# Patient Record
Sex: MALE | Race: OTHER | NOT HISPANIC OR LATINO | Employment: OTHER | URBAN - METROPOLITAN AREA
[De-identification: names, ages, dates, MRNs, and addresses within clinical notes are randomized per-mention and may not be internally consistent; named-entity substitution may affect disease eponyms.]

---

## 2020-01-08 ENCOUNTER — OFFICE VISIT (OUTPATIENT)
Dept: FAMILY MEDICINE CLINIC | Facility: CLINIC | Age: 80
End: 2020-01-08
Payer: MEDICARE

## 2020-01-08 VITALS
RESPIRATION RATE: 16 BRPM | HEIGHT: 62 IN | WEIGHT: 135.2 LBS | OXYGEN SATURATION: 97 % | BODY MASS INDEX: 24.88 KG/M2 | HEART RATE: 56 BPM | TEMPERATURE: 98.3 F | SYSTOLIC BLOOD PRESSURE: 132 MMHG | DIASTOLIC BLOOD PRESSURE: 66 MMHG

## 2020-01-08 DIAGNOSIS — I10 ESSENTIAL HYPERTENSION: Primary | ICD-10-CM

## 2020-01-08 DIAGNOSIS — C61 PROSTATE CANCER (HCC): ICD-10-CM

## 2020-01-08 DIAGNOSIS — Z13.220 SCREENING FOR HYPERLIPIDEMIA: ICD-10-CM

## 2020-01-08 DIAGNOSIS — I10 ESSENTIAL HYPERTENSION: ICD-10-CM

## 2020-01-08 DIAGNOSIS — C61 PROSTATE CANCER (HCC): Primary | ICD-10-CM

## 2020-01-08 DIAGNOSIS — R26.89 LOSS OF BALANCE: ICD-10-CM

## 2020-01-08 PROCEDURE — 99203 OFFICE O/P NEW LOW 30 MIN: CPT | Performed by: FAMILY MEDICINE

## 2020-01-08 NOTE — PROGRESS NOTES
Assessment/Plan:  Chief Complaint   Patient presents with    Establish Care     Pt presents to establish care  Pt has medications that he is taking but is not sure of the names to his medications  Pt's daughter will bring them in later to have them documented  Patient Instructions   Here for establishing care and takes 2 medications one for HTN and another for prostate issues  He was suppose to start treatment for his prostate  Await medical records and also medication list  Call if worse and rec ER if any further loss of balance or falls  Stay well hydrated  Establish Oncologist for hx of prostate cancer as well as urologist for hx of prostate cancer  No problem-specific Assessment & Plan notes found for this encounter  Diagnoses and all orders for this visit:    Prostate cancer ([facility] [address] )  -     UA w Reflex to Microscopic w Reflex to Culture -Lab Collect    Loss of balance  -     Comprehensive metabolic panel; Future  -     CBC and differential; Future  -     UA w Reflex to Microscopic w Reflex to Culture -Lab Collect    Essential hypertension    Screening for hyperlipidemia  -     Comprehensive metabolic panel; Future  -     Lipid Panel with Direct LDL reflex; Future          Subjective:      Patient ID: Carlos Tovar is a 78 y o  male  Establish Care (Pt presents to establish care  Pt has medications that he is taking but is not sure of the names to his medications  Pt's daughter will bring them in later to have them documented  ) He lives in  and goes back and forth to [address]  He Has prostate cancer and sees doctor in [facility] for prostate cancer  Takes medication for prostate and also for HTN  Pt  Is feeling better today  He had some dizziness recently  It is better today  No fever or chills or cp or sob, or ha         The following portions of the patient's history were reviewed and updated as appropriate: allergies, current medications, past family history, past medical history, past social history, past surgical history and problem list     Review of Systems   Constitutional: Negative  HENT: Negative  Eyes: Negative  Respiratory: Negative  Cardiovascular: Negative  Gastrointestinal: Negative  Endocrine: Negative  Genitourinary: Negative  Musculoskeletal: Negative  Skin: Negative  Allergic/Immunologic: Negative  Neurological: Negative  Hematological: Negative  Psychiatric/Behavioral: Negative  Objective:      /66 (BP Location: Left arm, Patient Position: Sitting, Cuff Size: Adult)   Pulse 56   Temp 98 3 °F (36 8 °C) (Temporal)   Resp 16   Ht 5' 1 61" (1 565 m)   Wt 61 3 kg (135 lb 3 2 oz)   SpO2 97%   BMI 25 04 kg/m²          Physical Exam   Constitutional: He is oriented to person, place, and time  He appears well-developed and well-nourished  HENT:   Head: Normocephalic and atraumatic  Right Ear: External ear normal    Left Ear: External ear normal    Nose: Nose normal    Mouth/Throat: Oropharynx is clear and moist    Eyes: Pupils are equal, round, and reactive to light  Conjunctivae and EOM are normal    Neck: Normal range of motion  Neck supple  Cardiovascular: Normal rate, regular rhythm, normal heart sounds and intact distal pulses  Pulmonary/Chest: Effort normal and breath sounds normal    Abdominal: Soft  Bowel sounds are normal    Musculoskeletal: Normal range of motion  Neurological: He is alert and oriented to person, place, and time  He has normal reflexes  Skin: Skin is warm and dry  Psychiatric: He has a normal mood and affect   His behavior is normal

## 2020-01-08 NOTE — TELEPHONE ENCOUNTER
Ok to refill ramipril 10 mg once daily Disp #30 with 5 refills and oxybutynin 5 mg daily disp #30 with 5 refills   Send to [place] but get specific address

## 2020-01-08 NOTE — PATIENT INSTRUCTIONS
Here for establishing care and takes 2 medications one for HTN and another for prostate issues  He was suppose to start treatment for his prostate  Await medical records and also medication list  Call if worse and rec ER if any further loss of balance or falls  Stay well hydrated  Establish Oncologist for hx of prostate cancer as well as urologist for hx of prostate cancer

## 2020-01-08 NOTE — TELEPHONE ENCOUNTER
Patients daughter stopped in with patients medication containers stating he needs refills on them    -Lotensor 10 (Ramipril 10mg)    - Urginal - Oxbutinina Clorhidrato 5mg    30 day supply  UUSEE

## 2020-01-08 NOTE — PROGRESS NOTES
BMI Counseling: Body mass index is 25 04 kg/m²  The BMI is above normal  Nutrition recommendations include 3-5 servings of fruits/vegetables daily and reducing intake of cholesterol  Exercise recommendations include exercising 3-5 times per week

## 2020-01-09 ENCOUNTER — TRANSCRIBE ORDERS (OUTPATIENT)
Dept: LAB | Facility: CLINIC | Age: 80
End: 2020-01-09

## 2020-01-09 ENCOUNTER — APPOINTMENT (OUTPATIENT)
Dept: LAB | Facility: CLINIC | Age: 80
End: 2020-01-09
Payer: MEDICARE

## 2020-01-09 DIAGNOSIS — R26.89 LOSS OF BALANCE: ICD-10-CM

## 2020-01-09 DIAGNOSIS — Z13.220 SCREENING FOR HYPERLIPIDEMIA: ICD-10-CM

## 2020-01-09 LAB
ALBUMIN SERPL BCP-MCNC: 3.5 G/DL (ref 3.5–5)
ALP SERPL-CCNC: 59 U/L (ref 46–116)
ALT SERPL W P-5'-P-CCNC: 25 U/L (ref 12–78)
ANION GAP SERPL CALCULATED.3IONS-SCNC: 4 MMOL/L (ref 4–13)
AST SERPL W P-5'-P-CCNC: 14 U/L (ref 5–45)
BASOPHILS # BLD AUTO: 0.03 THOUSANDS/ΜL (ref 0–0.1)
BASOPHILS NFR BLD AUTO: 0 % (ref 0–1)
BILIRUB SERPL-MCNC: 0.62 MG/DL (ref 0.2–1)
BILIRUB UR QL STRIP: NEGATIVE
BUN SERPL-MCNC: 13 MG/DL (ref 5–25)
CALCIUM SERPL-MCNC: 9.2 MG/DL (ref 8.3–10.1)
CHLORIDE SERPL-SCNC: 107 MMOL/L (ref 100–108)
CHOLEST SERPL-MCNC: 209 MG/DL (ref 50–200)
CLARITY UR: CLEAR
CO2 SERPL-SCNC: 30 MMOL/L (ref 21–32)
COLOR UR: YELLOW
CREAT SERPL-MCNC: 0.89 MG/DL (ref 0.6–1.3)
EOSINOPHIL # BLD AUTO: 0.18 THOUSAND/ΜL (ref 0–0.61)
EOSINOPHIL NFR BLD AUTO: 2 % (ref 0–6)
ERYTHROCYTE [DISTWIDTH] IN BLOOD BY AUTOMATED COUNT: 13.2 % (ref 11.6–15.1)
GFR SERPL CREATININE-BSD FRML MDRD: 80 ML/MIN/1.73SQ M
GLUCOSE P FAST SERPL-MCNC: 94 MG/DL (ref 65–99)
GLUCOSE UR STRIP-MCNC: NEGATIVE MG/DL
HCT VFR BLD AUTO: 37.1 % (ref 36.5–49.3)
HDLC SERPL-MCNC: 55 MG/DL
HGB BLD-MCNC: 12 G/DL (ref 12–17)
HGB UR QL STRIP.AUTO: NEGATIVE
IMM GRANULOCYTES # BLD AUTO: 0.04 THOUSAND/UL (ref 0–0.2)
IMM GRANULOCYTES NFR BLD AUTO: 1 % (ref 0–2)
KETONES UR STRIP-MCNC: NEGATIVE MG/DL
LDLC SERPL CALC-MCNC: 133 MG/DL (ref 0–100)
LEUKOCYTE ESTERASE UR QL STRIP: NEGATIVE
LYMPHOCYTES # BLD AUTO: 2.72 THOUSANDS/ΜL (ref 0.6–4.47)
LYMPHOCYTES NFR BLD AUTO: 35 % (ref 14–44)
MCH RBC QN AUTO: 31.8 PG (ref 26.8–34.3)
MCHC RBC AUTO-ENTMCNC: 32.3 G/DL (ref 31.4–37.4)
MCV RBC AUTO: 98 FL (ref 82–98)
MONOCYTES # BLD AUTO: 0.67 THOUSAND/ΜL (ref 0.17–1.22)
MONOCYTES NFR BLD AUTO: 9 % (ref 4–12)
NEUTROPHILS # BLD AUTO: 4.18 THOUSANDS/ΜL (ref 1.85–7.62)
NEUTS SEG NFR BLD AUTO: 53 % (ref 43–75)
NITRITE UR QL STRIP: NEGATIVE
NRBC BLD AUTO-RTO: 0 /100 WBCS
PH UR STRIP.AUTO: 6.5 [PH]
PLATELET # BLD AUTO: 249 THOUSANDS/UL (ref 149–390)
PMV BLD AUTO: 10.7 FL (ref 8.9–12.7)
POTASSIUM SERPL-SCNC: 4.3 MMOL/L (ref 3.5–5.3)
PROT SERPL-MCNC: 7.4 G/DL (ref 6.4–8.2)
PROT UR STRIP-MCNC: NEGATIVE MG/DL
RBC # BLD AUTO: 3.77 MILLION/UL (ref 3.88–5.62)
SODIUM SERPL-SCNC: 141 MMOL/L (ref 136–145)
SP GR UR STRIP.AUTO: 1.02 (ref 1–1.03)
TRIGL SERPL-MCNC: 106 MG/DL
UROBILINOGEN UR QL STRIP.AUTO: 1 E.U./DL
WBC # BLD AUTO: 7.82 THOUSAND/UL (ref 4.31–10.16)

## 2020-01-09 PROCEDURE — 81003 URINALYSIS AUTO W/O SCOPE: CPT | Performed by: FAMILY MEDICINE

## 2020-01-09 PROCEDURE — 36415 COLL VENOUS BLD VENIPUNCTURE: CPT

## 2020-01-09 PROCEDURE — 85025 COMPLETE CBC W/AUTO DIFF WBC: CPT

## 2020-01-09 PROCEDURE — 80061 LIPID PANEL: CPT

## 2020-01-09 PROCEDURE — 80053 COMPREHEN METABOLIC PANEL: CPT

## 2020-01-09 RX ORDER — RAMIPRIL 10 MG/1
10 CAPSULE ORAL DAILY
Qty: 30 CAPSULE | Refills: 5 | Status: SHIPPED | OUTPATIENT
Start: 2020-01-09 | End: 2020-07-22

## 2020-01-09 RX ORDER — OXYBUTYNIN CHLORIDE 5 MG/1
5 TABLET ORAL DAILY
Qty: 30 TABLET | Refills: 5 | Status: SHIPPED | OUTPATIENT
Start: 2020-01-09 | End: 2020-02-28

## 2020-01-13 ENCOUNTER — TELEPHONE (OUTPATIENT)
Dept: HEMATOLOGY ONCOLOGY | Facility: CLINIC | Age: 80
End: 2020-01-13

## 2020-01-13 NOTE — TELEPHONE ENCOUNTER
New Patient Encounter    New Patient Intake Form   Patient Details:  Jose Richard  1940  [id number]    Background Information:  [address] starts by opening a telephone encounter and gathering the following information   Who is calling to schedule? If not self, relationship to patient? daughter   Referring Provider Anna Mcmahon   What is the diagnosis? Prostate CA, enlarging tumor   Is this diagnosis confirmed yes   Is there a confirmed diagnosis from a biopsy/tissue reviewed by pathology? Yes   Is there any prior history of Cancer? No   If yes, please explain    When was the diagnosis? Several years ago  approx 2015 [place] [facility]   Is patient aware of diagnosis? Yes   Reason for visit? NP DX   Have you had any testing done? If so: when, where? Yes   Are records in EPIC? no   Was the patient told to bring a disk? Yes, PT has FILMS not CDs   Scheduling Information:   Preferred [place]:  [facility] Provider? no   Are there any dates/time the patient cannot be seen? no      Miscellaneous: pt originally dx  apox 2015 w/ Prostate CA, no surgery or RT, only hormonal TX  Rencly enlarging tumor  Pt has been living in [facility] and recently  here to be near family  Records were given to Dr Mae Brunsno, will be  Scanned into Epic by Latoya Ching in that office  Pt w/b filmsPt scheduled to see Dr Sania Ahumada on 2/21/20   After completing the above information, please route to Financial Counselor and the appropriate Nurse Navigator for review

## 2020-01-28 ENCOUNTER — CONSULT (OUTPATIENT)
Dept: HEMATOLOGY ONCOLOGY | Facility: CLINIC | Age: 80
End: 2020-01-28
Payer: MEDICARE

## 2020-01-28 VITALS
RESPIRATION RATE: 16 BRPM | SYSTOLIC BLOOD PRESSURE: 120 MMHG | OXYGEN SATURATION: 94 % | DIASTOLIC BLOOD PRESSURE: 70 MMHG | TEMPERATURE: 97.8 F | HEIGHT: 62 IN | BODY MASS INDEX: 25.4 KG/M2 | HEART RATE: 54 BPM | WEIGHT: 138 LBS

## 2020-01-28 DIAGNOSIS — C61 PROSTATE CANCER (HCC): Primary | ICD-10-CM

## 2020-01-28 PROCEDURE — 99204 OFFICE O/P NEW MOD 45 MIN: CPT | Performed by: INTERNAL MEDICINE

## 2020-01-28 NOTE — PROGRESS NOTES
2020    Ricardo Patient was seen in consultation today in regards to prostate cancer  He is 78years old and was diagnosed with adenocarcinoma of the prostate, [place] score 3+3=6 by biopsy on May 25, 2010  According to the patient has been receiving androgen deprivation therapy every 3 months for approximately 1 year while in [facility]  He was seen in follow-up on 2019 by Dr Himanshu Foss in the Oncology Clinic  Enzalutamide 160 mg daily was recommended but has not yet been started  He has prostate enlargement with associated obstructive symptoms  Review of Systems:    General:  He notes increasing fatigue in the past year  Head and Neck: No nosebleeds, no oral cavity or throat soreness  Cardiovascular: No chest pain, no lower extremity edema  Respriatory:  He complains of cough and mucus production a few times per day  No dyspnea  GI: Appetite is good, no abdominal pain, he has had constipation in the past several months, previously bowel habits were formed and regular  :  He has urinary frequency and urinary hesitancy and nocturia 6-10 times  Musculoskeletal: No back pains or joint pain  Skin: No skin rash  Neurological:  He has mild headache 1-2 times per month  No numbness, no weakness  Hematologic: No easy bruising  Psychiatric: No emotional problems    Medications:    Current Outpatient Medications   Medication Sig Dispense Refill    oxybutynin (DITROPAN) 5 mg tablet Take 1 tablet (5 mg total) by mouth daily 30 tablet 5    ramipril (ALTACE) 10 MG capsule Take 1 capsule (10 mg total) by mouth daily 30 capsule 5     No current facility-administered medications for this visit        Past Medical History:  Hypertension    Past Surgical History:  Inguinal hernia repair    Family History:    His mother  age 80 with CVA, she had a pacemaker and heart disease  His father  of MI at age 80  He has 6 siblings all of whom are in good health  He has 1 daughter and 2 sons, all 3 of his children are in good health    Social History:    He is  and lives with his 2nd wife  His 1st wife passed away  He was born in the [facility] and 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 and was disabled due to a back injury at work  Subsequently, he moved back to the [facility] until 2 months ago when he came to live with his daughter  He has not been an alcohol drinker  He has not been a cigarette smoker    Physical Examination:    /70   Pulse (!) 54   Temp 97 8 °F (36 6 °C) (Tympanic)   Resp 16   Ht 5' 2" (1 575 m)   Wt 62 6 kg (138 lb)   SpO2 94%   BMI 25 24 kg/m²      General appearance: Appears well  Head: Normocephalic  Eyes: Extraocular movements intact  Ears: No gross hearing deficit  Oropharynx: Clear  Neck: Supple, No lymphadenopathy  Chest: No axillary adenopathy  Lungs: Clear to auscultation bilaterally  Heart: Regular rate and rhythm  Abdomen: No tenderness or distention, no hepatic or splenic enlargement; No inguinal  lymphadenopathy  Extremities: No lower extremity edema bilaterally  Skin: No rashes  Neurologic: Grossly intact, no focal neurological deficit  Psychiatric: Oriented to person, place and time, normal mood and affect    ECOG 0-1    Laboratory:    From November 11, 2019:  WBC is 8 7, hemoglobin 12 3, platelets 784, creatinine 0 85, PSA is 13 4 (0 0-4 0), testosterone 22 1 (181-759)    From January 9, 2020:  Creatinine 0 89, calcium 9 2, AST 14, ALT 25, alk-phos 59, bili 0 62, WBC 7 82, hemoglobin 12 0, platelets 134, WBC differential neutrophils 53%, immature is 1%, lymphs 35%, monos 9%, eos 2%    Echocardiogram on November 21, 2019: There is normal LV systolic function and grade 1 diastolic dysfunction    Assessment:    History of prostate cancer Nilay score 3+3=6 diagnosis May 2010 with rising PSA and prostatism      Recommendations:    Further evaluation is to begin with contrast enhanced CT scan of chest, abdomen, and pelvis and whole-body bone scan to assess for metastatic disease in addition to follow-up PSA  It is anticipated that androgen deprivation therapy will be resumed in the near future  If disease is localized to the prostate perhaps he will be candidate for prostate directed therapy which may relieve his  prostatism symptoms more effectively than systemic therapy  Limited medical records from Dr Isabell Schulte of the Oncology Clinic, [facility] Madi Wing written in [place]ones[place] have been sent for [place] translation  The patient and his daughter Avery Clements who was with him in the office today and who served to interpret from [place] to [facility] are aware seek medical attention for progressive cough, urinary frequency, difficulty voiding, excessive fatigue, or if other new problems arise  Otherwise, I plan to see him again after the evaluation above has been completed  Today's office visit required 45 minutes, over 50% of the time was utilized to review diagnostic tests, impressions and recommendations

## 2020-01-28 NOTE — TELEPHONE ENCOUNTER
Pt has active medicare part A & B effective 01/01/20  Not other ins on file  Called the pt to see if he has any other ins but got his voicemail  left him a message to call me back

## 2020-01-28 NOTE — PATIENT INSTRUCTIONS
The patient and his daughter [place] who was with me in the office today aware seek medical attention for progressive cough, urinary frequency, difficulty voiding, excessive fatigue, or if other new problems arise

## 2020-02-14 ENCOUNTER — TELEPHONE (OUTPATIENT)
Dept: INFUSION CENTER | Facility: HOSPITAL | Age: 80
End: 2020-02-14

## 2020-02-14 NOTE — TELEPHONE ENCOUNTER
Reviewed instructions for ditropan with daughter and reviewed patient's appt time with Dr Little Squires

## 2020-02-17 ENCOUNTER — HOSPITAL ENCOUNTER (OUTPATIENT)
Dept: NUCLEAR MEDICINE | Facility: HOSPITAL | Age: 80
Discharge: HOME | End: 2020-02-17
Attending: INTERNAL MEDICINE
Payer: MEDICARE

## 2020-02-17 ENCOUNTER — APPOINTMENT (OUTPATIENT)
Dept: LAB | Facility: HOSPITAL | Age: 80
End: 2020-02-17
Attending: INTERNAL MEDICINE
Payer: MEDICARE

## 2020-02-17 ENCOUNTER — HOSPITAL ENCOUNTER (OUTPATIENT)
Dept: CT IMAGING | Facility: HOSPITAL | Age: 80
Discharge: HOME | End: 2020-02-17
Attending: INTERNAL MEDICINE
Payer: MEDICARE

## 2020-02-17 DIAGNOSIS — C61 PROSTATE CANCER (HCC): ICD-10-CM

## 2020-02-17 LAB — PSA SERPL-MCNC: 5.2 NG/ML (ref 0–4)

## 2020-02-17 PROCEDURE — 71260 CT THORAX DX C+: CPT

## 2020-02-17 PROCEDURE — 78306 BONE IMAGING WHOLE BODY: CPT

## 2020-02-17 PROCEDURE — A9503 TC99M MEDRONATE: HCPCS

## 2020-02-17 PROCEDURE — 74177 CT ABD & PELVIS W/CONTRAST: CPT

## 2020-02-17 PROCEDURE — 84153 ASSAY OF PSA TOTAL: CPT

## 2020-02-17 RX ADMIN — IOHEXOL 100 ML: 350 INJECTION, SOLUTION INTRAVENOUS at 11:57

## 2020-02-21 ENCOUNTER — TELEPHONE (OUTPATIENT)
Dept: HEMATOLOGY ONCOLOGY | Facility: CLINIC | Age: 80
End: 2020-02-21

## 2020-02-21 ENCOUNTER — OFFICE VISIT (OUTPATIENT)
Dept: HEMATOLOGY ONCOLOGY | Facility: CLINIC | Age: 80
End: 2020-02-21
Payer: MEDICARE

## 2020-02-21 VITALS
HEART RATE: 54 BPM | TEMPERATURE: 97.2 F | SYSTOLIC BLOOD PRESSURE: 130 MMHG | WEIGHT: 137 LBS | DIASTOLIC BLOOD PRESSURE: 70 MMHG | BODY MASS INDEX: 25.21 KG/M2 | RESPIRATION RATE: 16 BRPM | HEIGHT: 62 IN | OXYGEN SATURATION: 94 %

## 2020-02-21 DIAGNOSIS — I71.2 THORACIC AORTIC ANEURYSM WITHOUT RUPTURE (HCC): ICD-10-CM

## 2020-02-21 DIAGNOSIS — C61 PROSTATE CANCER (HCC): Primary | ICD-10-CM

## 2020-02-21 PROCEDURE — 1160F RVW MEDS BY RX/DR IN RCRD: CPT | Performed by: INTERNAL MEDICINE

## 2020-02-21 PROCEDURE — 3078F DIAST BP <80 MM HG: CPT | Performed by: INTERNAL MEDICINE

## 2020-02-21 PROCEDURE — 99213 OFFICE O/P EST LOW 20 MIN: CPT | Performed by: INTERNAL MEDICINE

## 2020-02-21 PROCEDURE — 1036F TOBACCO NON-USER: CPT | Performed by: INTERNAL MEDICINE

## 2020-02-21 PROCEDURE — 3075F SYST BP GE 130 - 139MM HG: CPT | Performed by: INTERNAL MEDICINE

## 2020-02-21 NOTE — PATIENT INSTRUCTIONS
The patient and his daughter Roberto Carlos Alvarez are aware seek medical attention for progressive cough, urinary frequency, difficulty voiding, excessive fatigue, or if other new problems arise  Otherwise, I plan to see him again after the evaluation above has been completed

## 2020-02-21 NOTE — PROGRESS NOTES
2/21/2020    [place]        Hematology/Oncology History:    History of prostate cancer Nilay score 3+3=6 diagnosis May 2010 with rising PSA and prostatism  Review of systems:      General:  He notes increasing fatigue in the past year  Head and Neck: No nosebleeds, no oral cavity or throat soreness  Cardiovascular: No chest pain, no lower extremity edema  Respriatory:  He complains of cough and mucus production a few times per day  No dyspnea  GI: Appetite is good, no abdominal pain, he has had constipation in the past several months, previously bowel habits were formed and regular  :  He has urinary frequency and urinary hesitancy and nocturia 6-10 times  Musculoskeletal: No back pains or joint pain  Skin: No skin rash  Neurological:  He has mild headache 1-2 times per month  No numbness, no weakness  Hematologic: No easy bruising  Psychiatric: No emotional problems    Physical Examination:    Blood pressure 130/70, pulse (!) 54, temperature (!) 97 2 °F (36 2 °C), temperature source Tympanic, resp  rate 16, height 5' 2" (1 575 m), weight 62 1 kg (137 lb), SpO2 94 %  Body surface area is 1 63 meters squared  General appearance: Appears well  HEENT:  EOMI  Oropharynx clear  No lymphadenopathy of the neck  Chest: No axillary adenopathy  Lungs: Clear to auscultation bilaterally  Heart: Regular rate and rhythm  Abdomen: No tenderness or distention, no hepatic or splenic enlargement; No inguinal  lymphadenopathy  Extremities: No lower extremity edema bilaterally  Skin: No rashes  Neurologic: Grossly intact, no focal neurological deficit  Psychiatric: Oriented to person, place and time, normal mood and affect     ECOG 0-1    Laboratory:    From February 17, 2020:  PSA is 5 2    Nuclear medicine bone scan from February 17, 2020: There are degenerative changes of the spine, no scintigraphic evidence of osseous metastasis      Contrast enhanced CT of chest, abdomen and pelvis from February 17, 2020: No focal lung consolidation, 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, mediastinum and hilar are unremarkable, bilateral gynecomastia, liver, spleen, pancreas, adrenals, kidneys are unremarkable other than simple renal cysts, no abdominal pelvic lymphadenopathy, prostate is enlarged, thickening of the bladder wall is likely due to chronic bladder outlet obstruction from the enlarge prostate, 9 2 x 3 8 x 6 6 cm subcutaneous fluid density cystic structure along the midline lower back, no destructive osseous lesion  Assessment/Plan:    History of prostate cancer Nilay score 3+3=6 diagnosis May 2010 with rising PSA and prostatism      Leuprolide is to be continued  The purpose, means administration potential risks of leuprolide were reviewed and written informed consent was obtained  The plan is leuprolide 22 5 mg IM q 3 months  The patient has an appointment with Dr Jim De La Cruz as to local measures to manage the enlarge prostate and symptoms of prostatism  The patient is a potential candidate for radiation therapy or microwave therapy  The patient has previously declined radiation therapy      Limited medical records from Dr Bro Manning of the Oncology Clinic, [facility] Madi Wing written in [facility] [place] [place] have been sent for [place] translation  Also, thoracic surgery evaluation is recommended as to management of the aneurysm of the ascending thoracic aorta      The patient and his daughter Haroon Ríos who was with him in the office today and who served to interpret from [place] to [facility] [place] [place] are aware seek medical attention for urinary frequency, difficulty voiding, excessive fatigue, or if other new problems arise  Otherwise, I plan to see him again in 3 months      Today's office visit required 45 minutes, over 50% of the time was utilized to review diagnostic tests, impressions and recommendations

## 2020-02-21 NOTE — TELEPHONE ENCOUNTER
I called the patient and left a message that the appointment for the injection had to be moved after Dr Saurav Florence appointment: 5/19 2 12 pm  I then verbalized if for some reason this appointment does not work to call the office to reschedule

## 2020-02-24 DIAGNOSIS — C61 PROSTATE CANCER (HCC): ICD-10-CM

## 2020-02-24 DIAGNOSIS — I71.2 THORACIC AORTIC ANEURYSM WITHOUT RUPTURE (HCC): Primary | ICD-10-CM

## 2020-02-25 ENCOUNTER — HOSPITAL ENCOUNTER (OUTPATIENT)
Dept: INFUSION CENTER | Facility: CLINIC | Age: 80
Discharge: HOME | End: 2020-02-25
Payer: MEDICARE

## 2020-02-25 VITALS
HEART RATE: 49 BPM | SYSTOLIC BLOOD PRESSURE: 174 MMHG | TEMPERATURE: 97.4 F | RESPIRATION RATE: 18 BRPM | DIASTOLIC BLOOD PRESSURE: 71 MMHG

## 2020-02-25 DIAGNOSIS — C61 PROSTATE CANCER (HCC): Primary | ICD-10-CM

## 2020-02-25 PROCEDURE — 96402 CHEMO HORMON ANTINEOPL SQ/IM: CPT

## 2020-02-25 RX ADMIN — LEUPROLIDE ACETATE 22.5 MG: KIT at 13:39

## 2020-02-25 NOTE — SOCIAL WORK
MSW received pt's distress thermometer, scoring high (5)  No family or spiritual concerns noted at this time  Pt notes concerns with treatment decisions, several physical concerns such as changes in urination, constipation, fatigue, dry/congested nose, and sleep (loss of sleep)  For emotional concerns, pt notes nervousness, sadness, worry and a loss of interest in usual activities, and "sometimes" depression  Pt is here for treatment in the infusion center, accompanied by his daughter  MSW introduced themselves and discussed their role within patient support services  Pt was able to speak some English but daughter mainly translated for him with pt's consent  Pt was currently having his blood pressure taken and reported some pain following an injection  He had thought there would be a numbing agent prior to the injection and was waiting for an infusion RN to bring him some ice for the injection site  Pt shared that he was doing okay, nothing too concerning at this time otherwise  MSW offered follow up support as needed and provided contact information, encouraging the pt to call at any time  Pt was grateful for the outreach

## 2020-02-25 NOTE — PROGRESS NOTES
Presented today for lupron injection, same given as ordered in L gluteal region, tolerated well, will return for next appointment as scheduled

## 2020-02-28 ENCOUNTER — OFFICE VISIT (OUTPATIENT)
Dept: UROLOGY | Facility: CLINIC | Age: 80
End: 2020-02-28
Payer: MEDICARE

## 2020-02-28 VITALS
HEART RATE: 48 BPM | SYSTOLIC BLOOD PRESSURE: 122 MMHG | BODY MASS INDEX: 25.21 KG/M2 | DIASTOLIC BLOOD PRESSURE: 64 MMHG | WEIGHT: 137 LBS | HEIGHT: 62 IN

## 2020-02-28 DIAGNOSIS — R39.9 LOWER URINARY TRACT SYMPTOMS: ICD-10-CM

## 2020-02-28 DIAGNOSIS — C61 PROSTATE CANCER (HCC): Primary | ICD-10-CM

## 2020-02-28 LAB — POST-VOID RESIDUAL VOLUME, ML POC: 16 ML

## 2020-02-28 PROCEDURE — 99213 OFFICE O/P EST LOW 20 MIN: CPT | Performed by: UROLOGY

## 2020-02-28 PROCEDURE — 1160F RVW MEDS BY RX/DR IN RCRD: CPT | Performed by: UROLOGY

## 2020-02-28 PROCEDURE — 3008F BODY MASS INDEX DOCD: CPT | Performed by: UROLOGY

## 2020-02-28 PROCEDURE — 1036F TOBACCO NON-USER: CPT | Performed by: UROLOGY

## 2020-02-28 PROCEDURE — 51798 US URINE CAPACITY MEASURE: CPT | Performed by: UROLOGY

## 2020-02-28 PROCEDURE — 3074F SYST BP LT 130 MM HG: CPT | Performed by: UROLOGY

## 2020-02-28 PROCEDURE — 3078F DIAST BP <80 MM HG: CPT | Performed by: UROLOGY

## 2020-02-28 RX ORDER — TAMSULOSIN HYDROCHLORIDE 0.4 MG/1
0.4 CAPSULE ORAL
Qty: 30 CAPSULE | Refills: 6 | Status: SHIPPED | OUTPATIENT
Start: 2020-02-28 | End: 2020-04-24 | Stop reason: SDUPTHER

## 2020-02-28 RX ORDER — FINASTERIDE 5 MG/1
5 TABLET, FILM COATED ORAL DAILY
Qty: 30 TABLET | Refills: 6 | Status: SHIPPED | OUTPATIENT
Start: 2020-02-28 | End: 2020-04-24 | Stop reason: SDUPTHER

## 2020-02-28 NOTE — PROGRESS NOTES
UROLOGY NEW CONSULT NOTE     CHIEF COMPLAINT   Lisa Jason is a 78 y o  male with a complaint of   Chief Complaint   Patient presents with    Follow-up    Prostate Cancer       History of Present Illness:     78 y o  male   Who presents with his daughter  Patient was diagnosed in 2010 in the [facility] with low risk Nilay 6 prostate cancer  Unfortunately do not have records of this  The patient was  Offered treatment at that time but given the low risk, this was deferred  The patient has had significant urinary symptoms and has been managed on oxybutynin  His daughter does report some occasional intermittent constipation and confusion  Patient's primary concern is nocturia 2-3 times per night and urinary frequency  He presents today for discussion of the symptoms and his prostate cancer  He has seen Medical Oncology was undertaken staging imaging with CT scan and bone scan in these are negative for diffuse disease  Lab Results   Component Value Date    PSA 5 2 (H) 02/17/2020     Past Medical History:     Past Medical History:   Diagnosis Date    Hypertension     Prostate cancer ([facility] [address] )        PAST SURGICAL HISTORY:     Past Surgical History:   Procedure Laterality Date    HERNIA REPAIR         CURRENT MEDICATIONS:     Current Outpatient Medications   Medication Sig Dispense Refill    oxybutynin (DITROPAN) 5 mg tablet Take 1 tablet (5 mg total) by mouth daily 30 tablet 5    ramipril (ALTACE) 10 MG capsule Take 1 capsule (10 mg total) by mouth daily 30 capsule 5     No current facility-administered medications for this visit          ALLERGIES:   No Known Allergies    SOCIAL HISTORY:     Social History     Socioeconomic History    Marital status: Single     Spouse name: None    Number of children: None    Years of education: None    Highest education level: None   Occupational History    None   Social Needs    Financial resource strain: None    Food insecurity:     Worry: None Inability: None    Transportation needs:     Medical: None     Non-medical: None   Tobacco Use    Smoking status: Never Smoker    Smokeless tobacco: Never Used   Substance and Sexual Activity    Alcohol use: Never     Frequency: Never    Drug use: Never    Sexual activity: None   Lifestyle    Physical activity:     Days per week: None     Minutes per session: None    Stress: None   Relationships    Social connections:     Talks on phone: None     Gets together: None     Attends Hindu service: None     Active member of club or organization: None     Attends meetings of clubs or organizations: None     Relationship status: None    Intimate partner violence:     Fear of current or ex partner: None     Emotionally abused: None     Physically abused: None     Forced sexual activity: None   Other Topics Concern    None   Social History Narrative    None       SOCIAL HISTORY:     Family History   Problem Relation Age of Onset    No Known Problems Mother     No Known Problems Father        REVIEW OF SYSTEMS:     Review of Systems   Constitutional: Negative  Respiratory: Negative  Cardiovascular: Negative  Gastrointestinal: Negative  Genitourinary: Positive for frequency and urgency  Musculoskeletal: Negative  Skin: Negative  Psychiatric/Behavioral: Negative  PHYSICAL EXAM:     /64 (BP Location: Right arm, Patient Position: Sitting, Cuff Size: Adult)   Pulse (!) 48   Ht 5' 2" (1 575 m)   Wt 62 1 kg (137 lb)   BMI 25 06 kg/m²     General:  Healthy appearing male in no acute distress  They have a normal affect  There is not appear to be any gross neurologic defects or abnormalities  HEENT:  Normocephalic, atraumatic  Neck is supple without any palpable lymphadenopathy  Cardiovascular:  Patient has normal palpable distal radial pulses  There is no significant peripheral edema  No JVD is noted  Respiratory:  Patient has unlabored respirations    There is no audible wheeze or rhonchi  Abdomen:   Abdomen is soft and nontender  There is no tympany  Inguinal and umbilical hernia are not appreciated  Genitourinary: Deferred at this time    Musculoskeletal:  Patient does not have significant CVA tenderness in the  flank with palpation or percussion  They full range of motion in all 4 extremities  Strength in all 4 extremities appears congruent  Patient is able to ambulate without assistance or difficulty  Dermatologic:  Patient has no skin abnormalities or rashes  LABS:     CBC:   Lab Results   Component Value Date    WBC 7 82 2020    HGB 12 0 2020    HCT 37 1 2020    MCV 98 2020     2020       BMP:   Lab Results   Component Value Date    CALCIUM 9 2 2020    K 4 3 2020    CO2 30 2020     2020    BUN 13 2020    CREATININE 0 89 2020     Lab Results   Component Value Date    PSA 5 2 (H) 2020     IMAGIN/17/20  CT CHEST, ABDOMEN AND PELVIS WITH IV CONTRAST     INDICATION:   C61: Malignant neoplasm of prostate      COMPARISON:  None      TECHNIQUE: CT examination of the chest, abdomen and pelvis was performed  Axial, sagittal, and coronal 2D reformatted images were created from the source data and submitted for interpretation      Radiation dose length product (DLP) for this visit:  526 mGy-cm   This examination, like all CT scans performed in the [facility], was performed utilizing techniques to minimize radiation dose exposure, including the use of iterative   reconstruction and automated exposure control      IV Contrast:  100 mL of iohexol (OMNIPAQUE)   was administered intravenously without immediate adverse reaction  Enteric Contrast: Enteric contrast was administered      FINDINGS:     CHEST     LUNGS:  Mild atelectatic changes in the right greater than left lower lobes  No focal consolidation    Mild secretions are seen within the trachea      PLEURA: Unremarkable      HEART/GREAT VESSELS:  Coronary artery calcifications  The heart is normal in size  No pericardial effusion  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  The aorta tapers to 3 1 cm at the level of the aortic arch      MEDIASTINUM AND JOSEPHINE:  Unremarkable      CHEST WALL AND LOWER NECK:   Bilateral gynecomastia      ABDOMEN     LIVER/BILIARY TREE:  Unremarkable      GALLBLADDER:  No calcified gallstones  No pericholecystic inflammatory change      SPLEEN:  Unremarkable      PANCREAS:  Unremarkable      ADRENAL GLANDS:  Unremarkable      KIDNEYS/URETERS:  One or more simple renal cyst(s) is noted  Otherwise unremarkable kidneys  No hydronephrosis      STOMACH AND BOWEL:  No bowel obstruction  Diverticulosis without evidence of diverticulitis      APPENDIX:  No findings to suggest appendicitis      ABDOMINOPELVIC CAVITY:  No ascites or free intraperitoneal air  No lymphadenopathy      VESSELS:  Mild atherosclerotic calcifications      PELVIS     REPRODUCTIVE ORGANS:  Enlarged prostate      URINARY BLADDER:  Thickening of the bladder wall likely due to chronic bladder outlet obstruction due to enlarged prostate      ABDOMINAL WALL/INGUINAL REGIONS:  Small fat-containing right inguinal hernia  Status post left inguinal hernia repair  Large subcutaneous fluid density cystic structure along the midline lower back measuring 9 2 x 3 8 x 6 6 cm (series 2, image 75)      OSSEOUS STRUCTURES:  No acute fracture or destructive osseous lesion  Degenerative changes of the osseous structures      IMPRESSION:     1  No evidence of metastasis within the chest, abdomen, or pelvis  2   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  3   Diverticulosis without evidence of diverticulitis  4   Thickening of the bladder likely due to chronic bladder outlet obstruction due to enlarged prostate, correlate with urinalysis to exclude infection    5   Large subcutaneous fluid density cystic structure along the midline lower back measuring 9 2 x 3 8 x 6 6 cm, nonspecific however may represent a sebaceous cyst, correlate clinically  6  Other findings as above  2/17/20  BONE SCAN  WHOLE BODY     INDICATION: C61: Malignant neoplasm of prostate     PREVIOUS FILM CORRELATION:    CT 2/17/2020     TECHNIQUE:   This study was performed following the intravenous administration of 27 4 mCi Tc-99m labeled MDP  Delayed, anterior and posterior whole body images were acquired, 2-3 hours after radiopharmaceutical administration      FINDINGS:     Degenerative changes in the spine  Lobulated renal activity likely related to underlying cortical cysts  There is no scintigraphic evidence of osseous metastasis        IMPRESSION:     1  No scintigraphic evidence of osseous metastasis  PATHOLOGY:     Report of a history of [place] 3+3=6 prostate cancer from May of 2010    PROCEDURE:     Recent Results (from the past 2 hour(s))   POCT Measure PVR    Collection Time: 02/28/20  7:56 AM   Result Value Ref Range    POST-VOID RESIDUAL VOLUME, ML POC 16 mL        ASSESSMENT:     78 y o  male with remote diagnosis of low risk CaP ([place] 6) and symptoms of outlet obstruction    PLAN:      I have recommended the patient stop the Ditropan given some intermittent confusion and my concern about utilization of this medication in the elderly population  I instead I have recommended that we address the patient's prostate with Proscar 5 mg and  Flomax 0 4 mg  If the patient has significant irritability or worsening of his symptoms with stoppage of the oxybutynin, we can certainly add back in Myrbetriq which has a lower side effect profile  With regard to the patient's history of prostate cancer, I am unconcerned by his staging imaging and PSA level given his age    He has been on a defect 0 active surveillance protocol and so I have recommended an upfront multiparametric MRI of the prostate as we are approaching the 10 year danya  If there is occult lesion, I may consider repeat biopsy with fusion technique if the family wants to be aggressive  The alternative to this would be careful watchful waiting with monitoring of the PSA and consideration of repeat biopsy should the PSA go above 10  I will plan to see the patient back in 3 months to reassess his voiding symptoms and reviewed the MRI  If there are concerns about the imaging prior, I will certainly call him and see him back sooner

## 2020-03-13 ENCOUNTER — APPOINTMENT (OUTPATIENT)
Dept: LAB | Age: 80
End: 2020-03-13
Payer: MEDICARE

## 2020-03-13 ENCOUNTER — HOSPITAL ENCOUNTER (OUTPATIENT)
Dept: RADIOLOGY | Age: 80
Discharge: HOME | End: 2020-03-13
Payer: MEDICARE

## 2020-03-13 DIAGNOSIS — C61 PROSTATE CANCER (HCC): ICD-10-CM

## 2020-03-13 LAB
ANION GAP SERPL CALCULATED.3IONS-SCNC: 4 MMOL/L (ref 4–13)
BUN SERPL-MCNC: 13 MG/DL (ref 5–25)
CALCIUM SERPL-MCNC: 9.4 MG/DL (ref 8.3–10.1)
CHLORIDE SERPL-SCNC: 106 MMOL/L (ref 100–108)
CO2 SERPL-SCNC: 29 MMOL/L (ref 21–32)
CREAT SERPL-MCNC: 0.82 MG/DL (ref 0.6–1.3)
GFR SERPL CREATININE-BSD FRML MDRD: 83 ML/MIN/1.73SQ M
GLUCOSE P FAST SERPL-MCNC: 94 MG/DL (ref 65–99)
POTASSIUM SERPL-SCNC: 4 MMOL/L (ref 3.5–5.3)
SODIUM SERPL-SCNC: 139 MMOL/L (ref 136–145)

## 2020-03-13 PROCEDURE — 36415 COLL VENOUS BLD VENIPUNCTURE: CPT

## 2020-03-13 PROCEDURE — A9585 GADOBUTROL INJECTION: HCPCS | Performed by: UROLOGY

## 2020-03-13 PROCEDURE — 76377 3D RENDER W/INTRP POSTPROCES: CPT

## 2020-03-13 PROCEDURE — 80048 BASIC METABOLIC PNL TOTAL CA: CPT

## 2020-03-13 PROCEDURE — 72197 MRI PELVIS W/O & W/DYE: CPT

## 2020-03-13 RX ADMIN — GADOBUTROL 6 ML: 604.72 INJECTION INTRAVENOUS at 09:51

## 2020-03-24 ENCOUNTER — TELEPHONE (OUTPATIENT)
Dept: CARDIOLOGY CLINIC | Facility: CLINIC | Age: 80
End: 2020-03-24

## 2020-04-15 ENCOUNTER — DOCUMENTATION (OUTPATIENT)
Dept: HEMATOLOGY ONCOLOGY | Facility: CLINIC | Age: 80
End: 2020-04-15

## 2020-04-16 ENCOUNTER — DOCUMENTATION (OUTPATIENT)
Dept: HEMATOLOGY ONCOLOGY | Facility: CLINIC | Age: 80
End: 2020-04-16

## 2020-04-24 DIAGNOSIS — R39.9 LOWER URINARY TRACT SYMPTOMS: ICD-10-CM

## 2020-04-24 RX ORDER — TAMSULOSIN HYDROCHLORIDE 0.4 MG/1
0.4 CAPSULE ORAL
Qty: 90 CAPSULE | Refills: 0 | Status: SHIPPED | OUTPATIENT
Start: 2020-04-24 | End: 2020-07-22

## 2020-04-24 RX ORDER — FINASTERIDE 5 MG/1
5 TABLET, FILM COATED ORAL DAILY
Qty: 90 TABLET | Refills: 0 | Status: SHIPPED | OUTPATIENT
Start: 2020-04-24 | End: 2020-07-22

## 2020-05-05 ENCOUNTER — TELEPHONE (OUTPATIENT)
Dept: UROLOGY | Facility: CLINIC | Age: 80
End: 2020-05-05

## 2020-05-12 ENCOUNTER — TELEPHONE (OUTPATIENT)
Dept: HEMATOLOGY ONCOLOGY | Facility: CLINIC | Age: 80
End: 2020-05-12

## 2020-05-19 ENCOUNTER — OFFICE VISIT (OUTPATIENT)
Dept: HEMATOLOGY ONCOLOGY | Facility: CLINIC | Age: 80
End: 2020-05-19
Payer: MEDICARE

## 2020-05-19 ENCOUNTER — APPOINTMENT (OUTPATIENT)
Dept: LAB | Facility: CLINIC | Age: 80
End: 2020-05-19
Payer: MEDICARE

## 2020-05-19 ENCOUNTER — HOSPITAL ENCOUNTER (OUTPATIENT)
Dept: INFUSION CENTER | Facility: CLINIC | Age: 80
Discharge: HOME | End: 2020-05-19
Payer: MEDICARE

## 2020-05-19 VITALS
HEART RATE: 54 BPM | DIASTOLIC BLOOD PRESSURE: 88 MMHG | WEIGHT: 140.5 LBS | TEMPERATURE: 98.1 F | OXYGEN SATURATION: 98 % | BODY MASS INDEX: 25.86 KG/M2 | HEIGHT: 62 IN | RESPIRATION RATE: 18 BRPM | SYSTOLIC BLOOD PRESSURE: 178 MMHG

## 2020-05-19 VITALS
TEMPERATURE: 97.5 F | RESPIRATION RATE: 18 BRPM | DIASTOLIC BLOOD PRESSURE: 81 MMHG | SYSTOLIC BLOOD PRESSURE: 190 MMHG | HEART RATE: 53 BPM

## 2020-05-19 DIAGNOSIS — C61 PROSTATE CANCER (HCC): Primary | ICD-10-CM

## 2020-05-19 DIAGNOSIS — C61 PROSTATE CANCER (HCC): ICD-10-CM

## 2020-05-19 LAB
ALBUMIN SERPL BCP-MCNC: 3.9 G/DL (ref 3.5–5)
ALP SERPL-CCNC: 56 U/L (ref 46–116)
ALT SERPL W P-5'-P-CCNC: 26 U/L (ref 12–78)
ANION GAP SERPL CALCULATED.3IONS-SCNC: 4 MMOL/L (ref 4–13)
AST SERPL W P-5'-P-CCNC: 21 U/L (ref 5–45)
BASOPHILS # BLD AUTO: 0.04 THOUSANDS/ΜL (ref 0–0.1)
BASOPHILS NFR BLD AUTO: 1 % (ref 0–1)
BILIRUB SERPL-MCNC: 0.71 MG/DL (ref 0.2–1)
BUN SERPL-MCNC: 16 MG/DL (ref 5–25)
CALCIUM SERPL-MCNC: 9.5 MG/DL (ref 8.3–10.1)
CHLORIDE SERPL-SCNC: 106 MMOL/L (ref 100–108)
CO2 SERPL-SCNC: 29 MMOL/L (ref 21–32)
CREAT SERPL-MCNC: 0.89 MG/DL (ref 0.6–1.3)
EOSINOPHIL # BLD AUTO: 0.17 THOUSAND/ΜL (ref 0–0.61)
EOSINOPHIL NFR BLD AUTO: 3 % (ref 0–6)
ERYTHROCYTE [DISTWIDTH] IN BLOOD BY AUTOMATED COUNT: 13.2 % (ref 11.6–15.1)
GFR SERPL CREATININE-BSD FRML MDRD: 80 ML/MIN/1.73SQ M
GLUCOSE P FAST SERPL-MCNC: 103 MG/DL (ref 65–99)
HCT VFR BLD AUTO: 38 % (ref 36.5–49.3)
HGB BLD-MCNC: 12.2 G/DL (ref 12–17)
IMM GRANULOCYTES # BLD AUTO: 0.02 THOUSAND/UL (ref 0–0.2)
IMM GRANULOCYTES NFR BLD AUTO: 0 % (ref 0–2)
LYMPHOCYTES # BLD AUTO: 2.49 THOUSANDS/ΜL (ref 0.6–4.47)
LYMPHOCYTES NFR BLD AUTO: 40 % (ref 14–44)
MCH RBC QN AUTO: 31.2 PG (ref 26.8–34.3)
MCHC RBC AUTO-ENTMCNC: 32.1 G/DL (ref 31.4–37.4)
MCV RBC AUTO: 97 FL (ref 82–98)
MONOCYTES # BLD AUTO: 0.54 THOUSAND/ΜL (ref 0.17–1.22)
MONOCYTES NFR BLD AUTO: 9 % (ref 4–12)
NEUTROPHILS # BLD AUTO: 2.96 THOUSANDS/ΜL (ref 1.85–7.62)
NEUTS SEG NFR BLD AUTO: 47 % (ref 43–75)
NRBC BLD AUTO-RTO: 0 /100 WBCS
PLATELET # BLD AUTO: 239 THOUSANDS/UL (ref 149–390)
PMV BLD AUTO: 10.8 FL (ref 8.9–12.7)
POTASSIUM SERPL-SCNC: 5.2 MMOL/L (ref 3.5–5.3)
PROT SERPL-MCNC: 7.7 G/DL (ref 6.4–8.2)
PSA SERPL-MCNC: 1.8 NG/ML (ref 0–4)
RBC # BLD AUTO: 3.91 MILLION/UL (ref 3.88–5.62)
SODIUM SERPL-SCNC: 139 MMOL/L (ref 136–145)
WBC # BLD AUTO: 6.22 THOUSAND/UL (ref 4.31–10.16)

## 2020-05-19 PROCEDURE — 3077F SYST BP >= 140 MM HG: CPT | Performed by: INTERNAL MEDICINE

## 2020-05-19 PROCEDURE — 85025 COMPLETE CBC W/AUTO DIFF WBC: CPT

## 2020-05-19 PROCEDURE — 99214 OFFICE O/P EST MOD 30 MIN: CPT | Performed by: INTERNAL MEDICINE

## 2020-05-19 PROCEDURE — 36415 COLL VENOUS BLD VENIPUNCTURE: CPT

## 2020-05-19 PROCEDURE — 3079F DIAST BP 80-89 MM HG: CPT | Performed by: INTERNAL MEDICINE

## 2020-05-19 PROCEDURE — 96402 CHEMO HORMON ANTINEOPL SQ/IM: CPT

## 2020-05-19 PROCEDURE — 3008F BODY MASS INDEX DOCD: CPT | Performed by: INTERNAL MEDICINE

## 2020-05-19 PROCEDURE — 1160F RVW MEDS BY RX/DR IN RCRD: CPT | Performed by: INTERNAL MEDICINE

## 2020-05-19 PROCEDURE — 1036F TOBACCO NON-USER: CPT | Performed by: INTERNAL MEDICINE

## 2020-05-19 PROCEDURE — 80053 COMPREHEN METABOLIC PANEL: CPT

## 2020-05-19 PROCEDURE — 84153 ASSAY OF PSA TOTAL: CPT

## 2020-05-19 RX ADMIN — LEUPROLIDE ACETATE 22.5 MG: KIT at 12:49

## 2020-05-20 ENCOUNTER — TELEPHONE (OUTPATIENT)
Dept: CARDIOLOGY CLINIC | Facility: CLINIC | Age: 80
End: 2020-05-20

## 2020-05-21 ENCOUNTER — CONSULT (OUTPATIENT)
Dept: CARDIOLOGY CLINIC | Facility: CLINIC | Age: 80
End: 2020-05-21
Payer: MEDICARE

## 2020-05-21 VITALS
DIASTOLIC BLOOD PRESSURE: 70 MMHG | BODY MASS INDEX: 26.26 KG/M2 | WEIGHT: 143.6 LBS | TEMPERATURE: 98.7 F | SYSTOLIC BLOOD PRESSURE: 160 MMHG

## 2020-05-21 DIAGNOSIS — C61 PROSTATE CANCER (HCC): ICD-10-CM

## 2020-05-21 DIAGNOSIS — I10 HYPERTENSION, UNSPECIFIED TYPE: Primary | ICD-10-CM

## 2020-05-21 DIAGNOSIS — I71.2 THORACIC AORTIC ANEURYSM WITHOUT RUPTURE (HCC): ICD-10-CM

## 2020-05-21 DIAGNOSIS — I35.1 AORTIC VALVE INSUFFICIENCY, ETIOLOGY OF CARDIAC VALVE DISEASE UNSPECIFIED: ICD-10-CM

## 2020-05-21 PROCEDURE — 1160F RVW MEDS BY RX/DR IN RCRD: CPT

## 2020-05-21 PROCEDURE — 93000 ELECTROCARDIOGRAM COMPLETE: CPT

## 2020-05-21 PROCEDURE — 3078F DIAST BP <80 MM HG: CPT

## 2020-05-21 PROCEDURE — 3077F SYST BP >= 140 MM HG: CPT

## 2020-05-21 PROCEDURE — 1036F TOBACCO NON-USER: CPT

## 2020-05-21 PROCEDURE — 99204 OFFICE O/P NEW MOD 45 MIN: CPT

## 2020-05-22 ENCOUNTER — HOSPITAL ENCOUNTER (OUTPATIENT)
Dept: NON INVASIVE DIAGNOSTICS | Facility: HOSPITAL | Age: 80
Discharge: HOME | End: 2020-05-22
Payer: MEDICARE

## 2020-05-22 DIAGNOSIS — I71.2 THORACIC AORTIC ANEURYSM WITHOUT RUPTURE (HCC): ICD-10-CM

## 2020-05-22 DIAGNOSIS — I35.1 AORTIC VALVE INSUFFICIENCY, ETIOLOGY OF CARDIAC VALVE DISEASE UNSPECIFIED: ICD-10-CM

## 2020-05-22 PROCEDURE — 93306 TTE W/DOPPLER COMPLETE: CPT

## 2020-05-22 PROCEDURE — 93306 TTE W/DOPPLER COMPLETE: CPT | Performed by: INTERNAL MEDICINE

## 2020-06-01 ENCOUNTER — TELEPHONE (OUTPATIENT)
Dept: OTHER | Facility: OTHER | Age: 80
End: 2020-06-01

## 2020-06-02 ENCOUNTER — TELEPHONE (OUTPATIENT)
Dept: FAMILY MEDICINE CLINIC | Facility: CLINIC | Age: 80
End: 2020-06-02

## 2020-06-02 ENCOUNTER — OFFICE VISIT (OUTPATIENT)
Dept: FAMILY MEDICINE CLINIC | Facility: CLINIC | Age: 80
End: 2020-06-02
Payer: MEDICARE

## 2020-06-02 VITALS
BODY MASS INDEX: 26.2 KG/M2 | OXYGEN SATURATION: 97 % | HEART RATE: 57 BPM | SYSTOLIC BLOOD PRESSURE: 172 MMHG | HEIGHT: 62 IN | TEMPERATURE: 98.1 F | DIASTOLIC BLOOD PRESSURE: 90 MMHG | WEIGHT: 142.4 LBS | RESPIRATION RATE: 16 BRPM

## 2020-06-02 DIAGNOSIS — C61 PROSTATE CANCER (HCC): ICD-10-CM

## 2020-06-02 DIAGNOSIS — H61.21 IMPACTED CERUMEN OF RIGHT EAR: Primary | ICD-10-CM

## 2020-06-02 DIAGNOSIS — H93.11 TINNITUS OF RIGHT EAR: ICD-10-CM

## 2020-06-02 DIAGNOSIS — I10 ESSENTIAL HYPERTENSION: ICD-10-CM

## 2020-06-02 PROCEDURE — 1036F TOBACCO NON-USER: CPT | Performed by: FAMILY MEDICINE

## 2020-06-02 PROCEDURE — 3077F SYST BP >= 140 MM HG: CPT | Performed by: FAMILY MEDICINE

## 2020-06-02 PROCEDURE — 1160F RVW MEDS BY RX/DR IN RCRD: CPT | Performed by: FAMILY MEDICINE

## 2020-06-02 PROCEDURE — 3008F BODY MASS INDEX DOCD: CPT | Performed by: FAMILY MEDICINE

## 2020-06-02 PROCEDURE — 3080F DIAST BP >= 90 MM HG: CPT | Performed by: FAMILY MEDICINE

## 2020-06-02 PROCEDURE — 99214 OFFICE O/P EST MOD 30 MIN: CPT | Performed by: FAMILY MEDICINE

## 2020-07-22 ENCOUNTER — TELEPHONE (OUTPATIENT)
Dept: UROLOGY | Facility: MEDICAL CENTER | Age: 80
End: 2020-07-22

## 2020-07-22 DIAGNOSIS — R39.9 LOWER URINARY TRACT SYMPTOMS: ICD-10-CM

## 2020-07-22 DIAGNOSIS — I10 ESSENTIAL HYPERTENSION: ICD-10-CM

## 2020-07-22 DIAGNOSIS — C61 PROSTATE CANCER (HCC): ICD-10-CM

## 2020-07-22 RX ORDER — RAMIPRIL 10 MG/1
CAPSULE ORAL
Qty: 30 CAPSULE | Refills: 5 | Status: SHIPPED | OUTPATIENT
Start: 2020-07-22 | End: 2020-09-23

## 2020-07-22 RX ORDER — TAMSULOSIN HYDROCHLORIDE 0.4 MG/1
CAPSULE ORAL
Qty: 90 CAPSULE | Refills: 0 | Status: SHIPPED | OUTPATIENT
Start: 2020-07-22 | End: 2020-09-23

## 2020-07-22 RX ORDER — FINASTERIDE 5 MG/1
TABLET, FILM COATED ORAL
Qty: 90 TABLET | Refills: 0 | Status: SHIPPED | OUTPATIENT
Start: 2020-07-22 | End: 2020-09-23

## 2020-08-10 ENCOUNTER — APPOINTMENT (OUTPATIENT)
Dept: LAB | Facility: MEDICAL CENTER | Age: 80
End: 2020-08-10
Payer: MEDICARE

## 2020-08-10 LAB
ALBUMIN SERPL BCP-MCNC: 3.7 G/DL (ref 3.5–5)
ALP SERPL-CCNC: 52 U/L (ref 46–116)
ALT SERPL W P-5'-P-CCNC: 24 U/L (ref 12–78)
ANION GAP SERPL CALCULATED.3IONS-SCNC: 8 MMOL/L (ref 4–13)
AST SERPL W P-5'-P-CCNC: 19 U/L (ref 5–45)
BASOPHILS # BLD AUTO: 0.04 THOUSANDS/ΜL (ref 0–0.1)
BASOPHILS NFR BLD AUTO: 1 % (ref 0–1)
BILIRUB SERPL-MCNC: 0.38 MG/DL (ref 0.2–1)
BUN SERPL-MCNC: 27 MG/DL (ref 5–25)
CALCIUM SERPL-MCNC: 9.5 MG/DL (ref 8.3–10.1)
CHLORIDE SERPL-SCNC: 109 MMOL/L (ref 100–108)
CO2 SERPL-SCNC: 27 MMOL/L (ref 21–32)
CREAT SERPL-MCNC: 1.36 MG/DL (ref 0.6–1.3)
EOSINOPHIL # BLD AUTO: 0.21 THOUSAND/ΜL (ref 0–0.61)
EOSINOPHIL NFR BLD AUTO: 3 % (ref 0–6)
ERYTHROCYTE [DISTWIDTH] IN BLOOD BY AUTOMATED COUNT: 13.3 % (ref 11.6–15.1)
GFR SERPL CREATININE-BSD FRML MDRD: 48 ML/MIN/1.73SQ M
GLUCOSE SERPL-MCNC: 133 MG/DL (ref 65–140)
HCT VFR BLD AUTO: 36.8 % (ref 36.5–49.3)
HGB BLD-MCNC: 11.7 G/DL (ref 12–17)
IMM GRANULOCYTES # BLD AUTO: 0.01 THOUSAND/UL (ref 0–0.2)
IMM GRANULOCYTES NFR BLD AUTO: 0 % (ref 0–2)
LYMPHOCYTES # BLD AUTO: 2.57 THOUSANDS/ΜL (ref 0.6–4.47)
LYMPHOCYTES NFR BLD AUTO: 37 % (ref 14–44)
MCH RBC QN AUTO: 31.5 PG (ref 26.8–34.3)
MCHC RBC AUTO-ENTMCNC: 31.8 G/DL (ref 31.4–37.4)
MCV RBC AUTO: 99 FL (ref 82–98)
MONOCYTES # BLD AUTO: 0.65 THOUSAND/ΜL (ref 0.17–1.22)
MONOCYTES NFR BLD AUTO: 9 % (ref 4–12)
NEUTROPHILS # BLD AUTO: 3.53 THOUSANDS/ΜL (ref 1.85–7.62)
NEUTS SEG NFR BLD AUTO: 50 % (ref 43–75)
NRBC BLD AUTO-RTO: 0 /100 WBCS
PLATELET # BLD AUTO: 231 THOUSANDS/UL (ref 149–390)
PMV BLD AUTO: 11 FL (ref 8.9–12.7)
POTASSIUM SERPL-SCNC: 4.8 MMOL/L (ref 3.5–5.3)
PROT SERPL-MCNC: 7.5 G/DL (ref 6.4–8.2)
PSA SERPL-MCNC: 1.5 NG/ML (ref 0–4)
RBC # BLD AUTO: 3.71 MILLION/UL (ref 3.88–5.62)
SODIUM SERPL-SCNC: 144 MMOL/L (ref 136–145)
WBC # BLD AUTO: 7.01 THOUSAND/UL (ref 4.31–10.16)

## 2020-08-10 PROCEDURE — 85025 COMPLETE CBC W/AUTO DIFF WBC: CPT | Performed by: INTERNAL MEDICINE

## 2020-08-10 PROCEDURE — 84153 ASSAY OF PSA TOTAL: CPT | Performed by: INTERNAL MEDICINE

## 2020-08-10 PROCEDURE — 36415 COLL VENOUS BLD VENIPUNCTURE: CPT | Performed by: INTERNAL MEDICINE

## 2020-08-10 PROCEDURE — 80053 COMPREHEN METABOLIC PANEL: CPT | Performed by: INTERNAL MEDICINE

## 2020-08-11 ENCOUNTER — HOSPITAL ENCOUNTER (OUTPATIENT)
Dept: INFUSION CENTER | Facility: CLINIC | Age: 80
Discharge: HOME | End: 2020-08-11
Payer: MEDICARE

## 2020-08-11 ENCOUNTER — TELEMEDICINE (OUTPATIENT)
Dept: FAMILY MEDICINE CLINIC | Facility: CLINIC | Age: 80
End: 2020-08-11
Payer: MEDICARE

## 2020-08-11 ENCOUNTER — OFFICE VISIT (OUTPATIENT)
Dept: HEMATOLOGY ONCOLOGY | Facility: CLINIC | Age: 80
End: 2020-08-11
Payer: MEDICARE

## 2020-08-11 VITALS
SYSTOLIC BLOOD PRESSURE: 152 MMHG | HEIGHT: 62 IN | WEIGHT: 144.2 LBS | OXYGEN SATURATION: 98 % | HEART RATE: 54 BPM | TEMPERATURE: 97.8 F | BODY MASS INDEX: 26.54 KG/M2 | DIASTOLIC BLOOD PRESSURE: 78 MMHG | RESPIRATION RATE: 16 BRPM

## 2020-08-11 VITALS — TEMPERATURE: 97.3 F | RESPIRATION RATE: 16 BRPM | DIASTOLIC BLOOD PRESSURE: 67 MMHG | SYSTOLIC BLOOD PRESSURE: 170 MMHG

## 2020-08-11 DIAGNOSIS — Z11.59 ENCOUNTER FOR SCREENING FOR OTHER VIRAL DISEASES: Primary | ICD-10-CM

## 2020-08-11 DIAGNOSIS — C61 PROSTATE CANCER (HCC): Primary | ICD-10-CM

## 2020-08-11 PROCEDURE — 99214 OFFICE O/P EST MOD 30 MIN: CPT | Performed by: PHYSICIAN ASSISTANT

## 2020-08-11 PROCEDURE — 1036F TOBACCO NON-USER: CPT | Performed by: PHYSICIAN ASSISTANT

## 2020-08-11 PROCEDURE — 96402 CHEMO HORMON ANTINEOPL SQ/IM: CPT

## 2020-08-11 PROCEDURE — 3077F SYST BP >= 140 MM HG: CPT | Performed by: PHYSICIAN ASSISTANT

## 2020-08-11 PROCEDURE — 3078F DIAST BP <80 MM HG: CPT | Performed by: PHYSICIAN ASSISTANT

## 2020-08-11 PROCEDURE — 99213 OFFICE O/P EST LOW 20 MIN: CPT | Performed by: NURSE PRACTITIONER

## 2020-08-11 PROCEDURE — 1160F RVW MEDS BY RX/DR IN RCRD: CPT | Performed by: PHYSICIAN ASSISTANT

## 2020-08-11 RX ADMIN — LEUPROLIDE ACETATE 22.5 MG: KIT at 08:20

## 2020-08-11 NOTE — PROGRESS NOTES
COVID-19 Virtual Visit     Assessment/Plan:    Problem List Items Addressed This Visit     None      Visit Diagnoses     Encounter for screening for other viral diseases    -  Primary    Relevant Orders    Novel Coronavirus (COVID-19), PCR [facility] - Collected at   [facility] or [facility]        This virtual check-in was done via [employer] and patient was informed that this is a secure, HIPAA-compliant platform  He agrees to proceed     Disposition:      I referred Olivia Limon to one of our centralized sites for a COVID-19 swab for screening for upcoming flight  I spent 15 minutes with patient today in which greater than 50% of the time was spent in counseling/coordination of care regarding covid screen    Encounter provider YUSRA Narayanan    Provider located at [address] [place] [phone]    Recent Visits  No visits were found meeting these conditions  Showing recent visits within past 7 days and meeting all other requirements     Today's Visits  Date Type Provider Dept   08/11/20 Telemedicine [facility], [address] [place] [address] [address] [facility] today's visits and meeting all other requirements     Future Appointments  No visits were found meeting these conditions  Showing future appointments within next 150 days and meeting all other requirements        Patient agrees to participate in a virtual check in via telephone or video visit instead of presenting to the office to address urgent/immediate medical needs  Patient is aware this is a billable service  After connecting through [employer], the patient was identified by name and date of birth  Jackeline Gomez was informed that this was a telemedicine visit and that the exam was being conducted confidentially over secure lines  My office door was closed  No one else was in the room  Jackeline Gomez acknowledged consent and understanding of privacy and security of the telemedicine visit    I informed the patient that I have reviewed his record in Epic and presented the opportunity for him to ask any questions regarding the visit today  The patient agreed to participate  Subjective  Caitlin Moss is a 78 y o  male who is concerned about COVID-19  He reports no symptoms, requires screening  He has not had contact with a person who is under investigation for or who is positive for COVID-19 within the last 14 days  He has not been hospitalized recently for fever and/or lower respiratory symptoms  He is requesting COVID testing- at this time he has no symptoms but he plans on traveling for personal matters  There was a concern in regards to results being delayed  He needs testing within 5 days of flight  Traveling to   Past Medical History:   Diagnosis Date    Hypertension     Prostate cancer ([facility] [address] )     no surgery required       Past Surgical History:   Procedure Laterality Date    HERNIA REPAIR         Current Outpatient Medications   Medication Sig Dispense Refill    finasteride (PROSCAR) 5 mg tablet TAKE 1 TABLET BY MOUTH DAILY 90 tablet 0    ramipril (ALTACE) 10 MG capsule TAKE ONE CAPSULE BY MOUTH DAILY 30 capsule 5    tamsulosin (FLOMAX) 0 4 mg TAKE 1 CAPSULE BY MOUTH DAILY WITH DINNER 90 capsule 0     No current facility-administered medications for this visit  No Known Allergies    Review of Systems   Constitutional: Negative for chills and fever  Eyes: Negative for discharge  Respiratory: Negative for shortness of breath  Cardiovascular: Negative for chest pain  Gastrointestinal: Negative for constipation and diarrhea  Genitourinary: Negative for difficulty urinating  Musculoskeletal: Negative for joint swelling  Skin: Negative for rash  Neurological: Negative for headaches  Hematological: Negative for adenopathy  Psychiatric/Behavioral: The patient is not nervous/anxious  Video Exam    There were no vitals filed for this visit        [address] [place] [address] appears healthy, alert, no distress  Physical Exam  Constitutional:       General: He is not in acute distress  Appearance: Normal appearance  He is not ill-appearing, toxic-appearing or diaphoretic  HENT:      Head: Normocephalic and atraumatic  Nose: Nose normal    Pulmonary:      Effort: Pulmonary effort is normal  No respiratory distress  Musculoskeletal: Normal range of motion  Skin:     Coloration: Skin is not pale  Neurological:      Mental Status: He is alert  VIRTUAL VISIT DISCLAIMER    Caitlin Moss acknowledges that he has consented to an online visit or consultation  He understands that the online visit is based solely on information provided by him, and that, in the absence of a face-to-face physical evaluation by the physician, the diagnosis he receives is both limited and provisional in terms of accuracy and completeness  This is not intended to replace a full medical face-to-face evaluation by the physician  Caitlin Moss understands and accepts these terms

## 2020-08-11 NOTE — PROGRESS NOTES
Lupron given as ordered  Pt tolerated well  Offers no complaints  Pt is aware of next injection appointment

## 2020-08-11 NOTE — PROGRESS NOTES
Hematology/Oncology Outpatient Follow-up  Juan Antonio Moncada 78 y o  male 1940 [id number]    Date:  8/11/2020      Assessment and Plan:    1  Prostate cancer [facility])  23-year-old male presents for follow-up regarding history of prostate cancer  He is on Lupron every 3 months  PSA continues to decrease  Presently it is 1 5 ng/mL  He continues to feel well  He has constipation attributable to not being as active secondary to staying home more due to the COVID-19 pandemic  I suggested over-the-counter stool softener and laxative as needed  This was written on patient's after visit summary  Patient's daughter accompanied him to the visit today and provided translation when needed  Follow-up in 3 months  HPI:  23-year-old male presents for follow-up  In 2010 patient was diagnosed with low risk Nilay score 6 prostate cancer in the [facility]  He was given opportunity to be in treatment but due to low risk this was deferred  In February 2020 patient had no evidence of metastatic disease on chest abdomen pelvis imaging  There is thickening of bladder likely due to chronic bladder outlet syndrome due to obstruction from enlarged prostate  There is also a large subcutaneous fluid cystic structure in the mid line lower back measuring 9 2 x 3 8 x 6 6 cm  Both scan also completed February 2020 and was negative  MRI of the prostate completed in March 2020 showed very low likelihood of cancer present in the prostate  Patient has been receiving Lupron every 3 months  Interval history:    ROS: Review of Systems   Constitutional: Negative for appetite change, chills, fatigue, fever and unexpected weight change  HENT: Negative for mouth sores, nosebleeds and trouble swallowing  Respiratory: Negative for cough and shortness of breath  Cardiovascular: Negative for chest pain, palpitations and leg swelling     Gastrointestinal: Positive for constipation (since being at home more due to pandemic and not being as acitve)  Negative for abdominal pain, blood in stool, diarrhea, nausea and vomiting  Genitourinary: Positive for frequency (with meds, he urinates often)  Negative for difficulty urinating, dysuria and hematuria  Musculoskeletal: Negative for arthralgias  Skin: Negative  Neurological: Positive for dizziness (occassional)  Negative for weakness, light-headedness, numbness and headaches  Hematological: Negative  Psychiatric/Behavioral: Negative          Past Medical History:   Diagnosis Date    Hypertension     Prostate cancer ([facility] [address] )     no surgery required       Past Surgical History:   Procedure Laterality Date    HERNIA REPAIR         Social History     Socioeconomic History    Marital status: Single     Spouse name: None    Number of children: None    Years of education: None    Highest education level: None   Occupational History    None   Social Needs    Financial resource strain: None    Food insecurity     Worry: None     Inability: None    Transportation needs     Medical: None     Non-medical: None   Tobacco Use    Smoking status: Never Smoker    Smokeless tobacco: Never Used   Substance and Sexual Activity    Alcohol use: Never     Frequency: Never    Drug use: Never    Sexual activity: None   Lifestyle    Physical activity     Days per week: None     Minutes per session: None    Stress: None   Relationships    Social connections     Talks on phone: None     Gets together: None     Attends Moravian service: None     Active member of club or organization: None     Attends meetings of clubs or organizations: None     Relationship status: None    Intimate partner violence     Fear of current or ex partner: None     Emotionally abused: None     Physically abused: None     Forced sexual activity: None   Other Topics Concern    None   Social History Narrative    Consumes 1 cup of coffee per day       Family History   Problem Relation Age of Onset    No Known Problems Mother     No Known Problems Father        No Known Allergies      Current Outpatient Medications:     finasteride (PROSCAR) 5 mg tablet, TAKE 1 TABLET BY MOUTH DAILY, Disp: 90 tablet, Rfl: 0    ramipril (ALTACE) 10 MG capsule, TAKE ONE CAPSULE BY MOUTH DAILY, Disp: 30 capsule, Rfl: 5    tamsulosin (FLOMAX) 0 4 mg, TAKE 1 CAPSULE BY MOUTH DAILY WITH DINNER, Disp: 90 capsule, Rfl: 0  No current facility-administered medications for this visit  Physical Exam:  /78 (BP Location: Left arm, Patient Position: Sitting, Cuff Size: Adult)   Pulse (!) 54   Temp 97 8 °F (36 6 °C) (Tympanic)   Resp 16   Ht 5' 2" (1 575 m)   Wt 65 4 kg (144 lb 3 2 oz)   SpO2 98%   BMI 26 37 kg/m²     Physical Exam  Vitals signs reviewed  Constitutional:       General: He is not in acute distress  Appearance: He is well-developed  HENT:      Head: Normocephalic and atraumatic  Eyes:      General: No scleral icterus  Conjunctiva/sclera: Conjunctivae normal    Neck:      Musculoskeletal: Normal range of motion and neck supple  Cardiovascular:      Rate and Rhythm: Normal rate and regular rhythm  Heart sounds: Murmur present  Pulmonary:      Effort: Pulmonary effort is normal  No respiratory distress  Breath sounds: Normal breath sounds  Abdominal:      Palpations: Abdomen is soft  Tenderness: There is no abdominal tenderness  Musculoskeletal: Normal range of motion  General: No tenderness  Right lower leg: No edema  Left lower leg: No edema  Lymphadenopathy:      Cervical: No cervical adenopathy  Skin:     General: Skin is warm and dry  Neurological:      Mental Status: He is alert and oriented to person, place, and time  Cranial Nerves: No cranial nerve deficit         Labs:  Lab Results   Component Value Date    WBC 7 01 08/10/2020    HGB 11 7 (L) 08/10/2020    HCT 36 8 08/10/2020    MCV 99 (H) 08/10/2020     08/10/2020     Lab Results Component Value Date    K 4 8 08/10/2020     (H) 08/10/2020    CO2 27 08/10/2020    BUN 27 (H) 08/10/2020    CREATININE 1 36 (H) 08/10/2020    GLUF 103 (H) 05/19/2020    CALCIUM 9 5 08/10/2020    AST 19 08/10/2020    ALT 24 08/10/2020    ALKPHOS 52 08/10/2020    EGFR 48 08/10/2020     Patient voiced understanding and agreement in the above discussion  Aware to contact our office with questions/symptoms in the interim  This note has been generated by voice recognition software system  Therefore, there may be spelling, grammar, and or syntax errors  Please contact if questions arise

## 2020-08-13 DIAGNOSIS — Z11.59 ENCOUNTER FOR SCREENING FOR OTHER VIRAL DISEASES: ICD-10-CM

## 2020-08-13 PROCEDURE — U0003 INFECTIOUS AGENT DETECTION BY NUCLEIC ACID (DNA OR RNA); SEVERE ACUTE RESPIRATORY SYNDROME CORONAVIRUS 2 (SARS-COV-2) (CORONAVIRUS DISEASE [COVID-19]), AMPLIFIED PROBE TECHNIQUE, MAKING USE OF HIGH THROUGHPUT TECHNOLOGIES AS DESCRIBED BY CMS-2020-01-R: HCPCS | Performed by: NURSE PRACTITIONER

## 2020-08-14 LAB — SARS-COV-2 RNA SPEC QL NAA+PROBE: NOT DETECTED

## 2020-08-18 ENCOUNTER — TELEPHONE (OUTPATIENT)
Dept: UROLOGY | Facility: CLINIC | Age: 80
End: 2020-08-18

## 2020-09-23 DIAGNOSIS — R39.9 LOWER URINARY TRACT SYMPTOMS: ICD-10-CM

## 2020-09-23 DIAGNOSIS — I10 ESSENTIAL HYPERTENSION: ICD-10-CM

## 2020-09-23 DIAGNOSIS — C61 PROSTATE CANCER (HCC): ICD-10-CM

## 2020-09-23 RX ORDER — TAMSULOSIN HYDROCHLORIDE 0.4 MG/1
CAPSULE ORAL
Qty: 90 CAPSULE | Refills: 0 | Status: SHIPPED | OUTPATIENT
Start: 2020-09-23 | End: 2021-01-17 | Stop reason: SDUPTHER

## 2020-09-23 RX ORDER — RAMIPRIL 10 MG/1
CAPSULE ORAL
Qty: 90 CAPSULE | Refills: 2 | Status: SHIPPED | OUTPATIENT
Start: 2020-09-23 | End: 2020-11-30

## 2020-09-23 RX ORDER — FINASTERIDE 5 MG/1
TABLET, FILM COATED ORAL
Qty: 90 TABLET | Refills: 0 | Status: SHIPPED | OUTPATIENT
Start: 2020-09-23 | End: 2021-01-25 | Stop reason: SDUPTHER

## 2020-10-29 ENCOUNTER — DOCUMENTATION (OUTPATIENT)
Dept: HEMATOLOGY ONCOLOGY | Facility: CLINIC | Age: 80
End: 2020-10-29

## 2020-11-02 ENCOUNTER — LAB (OUTPATIENT)
Dept: LAB | Facility: HOSPITAL | Age: 80
End: 2020-11-02
Attending: INTERNAL MEDICINE
Payer: MEDICARE

## 2020-11-02 ENCOUNTER — TELEPHONE (OUTPATIENT)
Dept: HEMATOLOGY ONCOLOGY | Facility: CLINIC | Age: 80
End: 2020-11-02

## 2020-11-03 ENCOUNTER — OFFICE VISIT (OUTPATIENT)
Dept: HEMATOLOGY ONCOLOGY | Facility: CLINIC | Age: 80
End: 2020-11-03
Payer: MEDICARE

## 2020-11-03 ENCOUNTER — HOSPITAL ENCOUNTER (OUTPATIENT)
Dept: INFUSION CENTER | Facility: CLINIC | Age: 80
Discharge: HOME | End: 2020-11-03
Payer: MEDICARE

## 2020-11-03 VITALS
WEIGHT: 149 LBS | TEMPERATURE: 97.3 F | BODY MASS INDEX: 27.42 KG/M2 | HEIGHT: 62 IN | SYSTOLIC BLOOD PRESSURE: 160 MMHG | DIASTOLIC BLOOD PRESSURE: 82 MMHG | HEART RATE: 67 BPM

## 2020-11-03 VITALS — TEMPERATURE: 98.5 F

## 2020-11-03 DIAGNOSIS — Z78.9 NEED FOR FOLLOW-UP BY SOCIAL WORKER: ICD-10-CM

## 2020-11-03 DIAGNOSIS — D64.9 ANEMIA, UNSPECIFIED TYPE: ICD-10-CM

## 2020-11-03 DIAGNOSIS — D53.9 NUTRITIONAL ANEMIA, UNSPECIFIED: ICD-10-CM

## 2020-11-03 DIAGNOSIS — C61 PROSTATE CANCER (HCC): Primary | ICD-10-CM

## 2020-11-03 PROCEDURE — 96402 CHEMO HORMON ANTINEOPL SQ/IM: CPT

## 2020-11-03 PROCEDURE — 99214 OFFICE O/P EST MOD 30 MIN: CPT | Performed by: PHYSICIAN ASSISTANT

## 2020-11-03 RX ADMIN — LEUPROLIDE ACETATE 22.5 MG: KIT SUBCUTANEOUS at 09:00

## 2020-11-04 ENCOUNTER — PATIENT OUTREACH (OUTPATIENT)
Dept: CASE MANAGEMENT | Facility: HOSPITAL | Age: 80
End: 2020-11-04

## 2020-11-04 DIAGNOSIS — K59.00 CONSTIPATION, UNSPECIFIED CONSTIPATION TYPE: Primary | ICD-10-CM

## 2020-11-04 RX ORDER — DOCUSATE SODIUM 100 MG/1
100 CAPSULE, LIQUID FILLED ORAL 2 TIMES DAILY
Qty: 60 CAPSULE | Refills: 0 | Status: SHIPPED | OUTPATIENT
Start: 2020-11-04 | End: 2021-10-12 | Stop reason: HOSPADM

## 2020-11-12 ENCOUNTER — OFFICE VISIT (OUTPATIENT)
Dept: UROLOGY | Facility: CLINIC | Age: 80
End: 2020-11-12
Payer: MEDICARE

## 2020-11-12 VITALS
HEIGHT: 62 IN | TEMPERATURE: 97.3 F | DIASTOLIC BLOOD PRESSURE: 80 MMHG | SYSTOLIC BLOOD PRESSURE: 124 MMHG | BODY MASS INDEX: 27.42 KG/M2 | WEIGHT: 149 LBS

## 2020-11-12 DIAGNOSIS — C61 PROSTATE CANCER (HCC): Primary | ICD-10-CM

## 2020-11-12 PROCEDURE — 99213 OFFICE O/P EST LOW 20 MIN: CPT | Performed by: UROLOGY

## 2020-11-29 DIAGNOSIS — I10 ESSENTIAL HYPERTENSION: ICD-10-CM

## 2020-11-29 DIAGNOSIS — C61 PROSTATE CANCER (HCC): ICD-10-CM

## 2020-11-30 RX ORDER — RAMIPRIL 10 MG/1
CAPSULE ORAL
Qty: 90 CAPSULE | Refills: 2 | Status: SHIPPED | OUTPATIENT
Start: 2020-11-30 | End: 2021-01-25

## 2020-12-22 ENCOUNTER — TELEPHONE (OUTPATIENT)
Dept: FAMILY MEDICINE CLINIC | Facility: CLINIC | Age: 80
End: 2020-12-22

## 2021-01-17 DIAGNOSIS — R39.9 LOWER URINARY TRACT SYMPTOMS: ICD-10-CM

## 2021-01-17 RX ORDER — TAMSULOSIN HYDROCHLORIDE 0.4 MG/1
0.4 CAPSULE ORAL
Qty: 90 CAPSULE | Refills: 3 | Status: SHIPPED | OUTPATIENT
Start: 2021-01-17 | End: 2021-06-09 | Stop reason: SDUPTHER

## 2021-01-17 NOTE — TELEPHONE ENCOUNTER
An Auto-fax Refill Request for Finasteride 5mg was received from [address] [place] [address] on [facility] in [facility]  The patient was last seen on 11/12/20 by Dr Mateo Byrne in the [facility] location; continuation of the medication was authorized at that time    Request for same, 90 day supply with 3 refills was queued and forwarded to the Advanced Practitioner covering the [facility] location for approval

## 2021-01-24 DIAGNOSIS — I10 ESSENTIAL HYPERTENSION: ICD-10-CM

## 2021-01-24 DIAGNOSIS — C61 PROSTATE CANCER (HCC): ICD-10-CM

## 2021-01-25 DIAGNOSIS — R39.9 LOWER URINARY TRACT SYMPTOMS: ICD-10-CM

## 2021-01-25 RX ORDER — FINASTERIDE 5 MG/1
5 TABLET, FILM COATED ORAL DAILY
Qty: 90 TABLET | Refills: 3 | Status: SHIPPED | OUTPATIENT
Start: 2021-01-25 | End: 2021-06-09 | Stop reason: SDUPTHER

## 2021-01-25 RX ORDER — RAMIPRIL 10 MG/1
CAPSULE ORAL
Qty: 30 CAPSULE | Refills: 5 | Status: SHIPPED | OUTPATIENT
Start: 2021-01-25 | End: 2021-08-17 | Stop reason: SDUPTHER

## 2021-01-25 NOTE — TELEPHONE ENCOUNTER
An Auto-fax Refill Request for Finasteride 5mg was received from [employer] on [facility] in [facility]        The patient was last seen on 11/12/20 by Dr Cindi Funes in the [facility] location; continuation of the medication was authorized at that time    Request for same, 90 day supply with 3 refills was queued and forwarded to the Advanced Practitioner covering the [facility] location for approval

## 2021-02-02 ENCOUNTER — TELEPHONE (OUTPATIENT)
Dept: HEMATOLOGY ONCOLOGY | Facility: CLINIC | Age: 81
End: 2021-02-02

## 2021-02-02 ENCOUNTER — LAB (OUTPATIENT)
Dept: LAB | Facility: HOSPITAL | Age: 81
End: 2021-02-02
Payer: COMMERCIAL

## 2021-02-02 DIAGNOSIS — D64.9 ANEMIA, UNSPECIFIED TYPE: ICD-10-CM

## 2021-02-02 DIAGNOSIS — C61 PROSTATE CANCER (HCC): ICD-10-CM

## 2021-02-02 DIAGNOSIS — D53.9 NUTRITIONAL ANEMIA, UNSPECIFIED: ICD-10-CM

## 2021-02-02 LAB
FERRITIN SERPL-MCNC: 58 NG/ML (ref 8–388)
FOLATE SERPL-MCNC: 13.9 NG/ML (ref 3.1–17.5)
IRON SATN MFR SERPL: 24 %
IRON SERPL-MCNC: 74 UG/DL (ref 65–175)
RETICS # AUTO: NORMAL 10*3/UL (ref 14356–105094)
RETICS # CALC: 1.27 % (ref 0.37–1.87)
TIBC SERPL-MCNC: 305 UG/DL (ref 250–450)
TSH SERPL DL<=0.05 MIU/L-ACNC: 1.31 UIU/ML (ref 0.36–3.74)
VIT B12 SERPL-MCNC: 393 PG/ML (ref 100–900)

## 2021-02-02 PROCEDURE — 84443 ASSAY THYROID STIM HORMONE: CPT

## 2021-02-02 PROCEDURE — 85045 AUTOMATED RETICULOCYTE COUNT: CPT

## 2021-02-02 PROCEDURE — 82746 ASSAY OF FOLIC ACID SERUM: CPT

## 2021-02-02 PROCEDURE — 82728 ASSAY OF FERRITIN: CPT

## 2021-02-02 PROCEDURE — 82607 VITAMIN B-12: CPT

## 2021-02-02 PROCEDURE — 82668 ASSAY OF ERYTHROPOIETIN: CPT

## 2021-02-02 PROCEDURE — 83540 ASSAY OF IRON: CPT

## 2021-02-02 PROCEDURE — 83550 IRON BINDING TEST: CPT

## 2021-02-02 NOTE — TELEPHONE ENCOUNTER
LVM to review COVID-19 screening questions and remind the patient to have his labs completed prior to his appt

## 2021-02-03 ENCOUNTER — HOSPITAL ENCOUNTER (OUTPATIENT)
Dept: INFUSION CENTER | Facility: CLINIC | Age: 81
Discharge: HOME | End: 2021-02-03
Payer: COMMERCIAL

## 2021-02-03 VITALS
TEMPERATURE: 97.8 F | RESPIRATION RATE: 16 BRPM | SYSTOLIC BLOOD PRESSURE: 133 MMHG | HEART RATE: 65 BPM | DIASTOLIC BLOOD PRESSURE: 72 MMHG

## 2021-02-03 DIAGNOSIS — C61 PROSTATE CANCER (HCC): Primary | ICD-10-CM

## 2021-02-03 PROCEDURE — 96402 CHEMO HORMON ANTINEOPL SQ/IM: CPT

## 2021-02-03 RX ADMIN — LEUPROLIDE ACETATE 22.5 MG: KIT SUBCUTANEOUS at 10:16

## 2021-02-03 NOTE — PROGRESS NOTES
Pt  Denied new symptoms or concerns today  Eligard given  SQ in JOSÉ MIGUEL  Pt  Verbalized pain with this injection  RN explained this injection may be best given in the upper outer buttock area above gluteus muscle as Eligard is ordered SQ  The serum feels very thick  Future appointment scheduled as ordered  AVS declined  Pt  Daughter aware of next appointment

## 2021-02-04 LAB — EPO SERPL-ACNC: 19.1 MIU/ML (ref 2.6–18.5)

## 2021-02-12 DIAGNOSIS — Z23 ENCOUNTER FOR IMMUNIZATION: ICD-10-CM

## 2021-03-03 ENCOUNTER — TELEPHONE (OUTPATIENT)
Dept: HEMATOLOGY ONCOLOGY | Facility: CLINIC | Age: 81
End: 2021-03-03

## 2021-03-03 NOTE — TELEPHONE ENCOUNTER
[facility] for patient's daughter Hazel Storm advising her that the patient should keep his appointment tomorrow 3/4/2021 per Kiana Curiel PA-C

## 2021-03-04 ENCOUNTER — TELEPHONE (OUTPATIENT)
Dept: HEMATOLOGY ONCOLOGY | Facility: CLINIC | Age: 81
End: 2021-03-04

## 2021-03-04 NOTE — TELEPHONE ENCOUNTER
Patient's daughter Jerzy Ballesteros called to [facility]t today [facility]  Jerzy Ballesteros would like to just do the 4- appt   For any questions Jerzy Ballesteros can be reached at [phone]

## 2021-04-16 DIAGNOSIS — C61 PROSTATE CANCER (HCC): Primary | ICD-10-CM

## 2021-04-27 ENCOUNTER — TELEPHONE (OUTPATIENT)
Dept: HEMATOLOGY ONCOLOGY | Facility: CLINIC | Age: 81
End: 2021-04-27

## 2021-04-27 NOTE — TELEPHONE ENCOUNTER
Patient's daughter Fabian Dan called to cancel appt with Deisi Mitchell on 4-28-21   Patient is out of town

## 2021-04-27 NOTE — TELEPHONE ENCOUNTER
LVM for patient's daughter [facility], requesting a return call to [phone] to discuss updated labs

## 2021-04-28 ENCOUNTER — HOSPITAL ENCOUNTER (OUTPATIENT)
Dept: INFUSION CENTER | Facility: CLINIC | Age: 81
End: 2021-04-28

## 2021-06-02 ENCOUNTER — TELEPHONE (OUTPATIENT)
Dept: HEMATOLOGY ONCOLOGY | Facility: CLINIC | Age: 81
End: 2021-06-02

## 2021-06-02 NOTE — TELEPHONE ENCOUNTER
Appointment Confirmation     Appointment with  Malvina Severe    Appointment date & time  07-07-21 @ 8:30 am   Location [place]   Patient verbilized Understanding

## 2021-06-09 ENCOUNTER — APPOINTMENT (OUTPATIENT)
Dept: LAB | Facility: HOSPITAL | Age: 81
End: 2021-06-09
Payer: COMMERCIAL

## 2021-06-09 DIAGNOSIS — R39.9 LOWER URINARY TRACT SYMPTOMS: ICD-10-CM

## 2021-06-09 DIAGNOSIS — C61 PROSTATE CANCER (HCC): ICD-10-CM

## 2021-06-09 LAB
ALBUMIN SERPL BCP-MCNC: 3.4 G/DL (ref 3.5–5)
ALP SERPL-CCNC: 60 U/L (ref 46–116)
ALT SERPL W P-5'-P-CCNC: 17 U/L (ref 12–78)
ANION GAP SERPL CALCULATED.3IONS-SCNC: 10 MMOL/L (ref 4–13)
AST SERPL W P-5'-P-CCNC: 10 U/L (ref 5–45)
BASOPHILS # BLD AUTO: 0.05 THOUSANDS/ΜL (ref 0–0.1)
BASOPHILS NFR BLD AUTO: 1 % (ref 0–1)
BILIRUB SERPL-MCNC: 0.43 MG/DL (ref 0.2–1)
BUN SERPL-MCNC: 13 MG/DL (ref 5–25)
CALCIUM ALBUM COR SERPL-MCNC: 9.7 MG/DL (ref 8.3–10.1)
CALCIUM SERPL-MCNC: 9.2 MG/DL (ref 8.3–10.1)
CHLORIDE SERPL-SCNC: 104 MMOL/L (ref 100–108)
CO2 SERPL-SCNC: 28 MMOL/L (ref 21–32)
CREAT SERPL-MCNC: 0.94 MG/DL (ref 0.6–1.3)
EOSINOPHIL # BLD AUTO: 0.14 THOUSAND/ΜL (ref 0–0.61)
EOSINOPHIL NFR BLD AUTO: 2 % (ref 0–6)
ERYTHROCYTE [DISTWIDTH] IN BLOOD BY AUTOMATED COUNT: 12.9 % (ref 11.6–15.1)
GFR SERPL CREATININE-BSD FRML MDRD: 75 ML/MIN/1.73SQ M
GLUCOSE SERPL-MCNC: 156 MG/DL (ref 65–140)
HCT VFR BLD AUTO: 37.3 % (ref 36.5–49.3)
HGB BLD-MCNC: 11.9 G/DL (ref 12–17)
IMM GRANULOCYTES # BLD AUTO: 0.02 THOUSAND/UL (ref 0–0.2)
IMM GRANULOCYTES NFR BLD AUTO: 0 % (ref 0–2)
LYMPHOCYTES # BLD AUTO: 2.57 THOUSANDS/ΜL (ref 0.6–4.47)
LYMPHOCYTES NFR BLD AUTO: 37 % (ref 14–44)
MCH RBC QN AUTO: 30.8 PG (ref 26.8–34.3)
MCHC RBC AUTO-ENTMCNC: 31.9 G/DL (ref 31.4–37.4)
MCV RBC AUTO: 97 FL (ref 82–98)
MONOCYTES # BLD AUTO: 0.56 THOUSAND/ΜL (ref 0.17–1.22)
MONOCYTES NFR BLD AUTO: 8 % (ref 4–12)
NEUTROPHILS # BLD AUTO: 3.68 THOUSANDS/ΜL (ref 1.85–7.62)
NEUTS SEG NFR BLD AUTO: 52 % (ref 43–75)
NRBC BLD AUTO-RTO: 0 /100 WBCS
PLATELET # BLD AUTO: 237 THOUSANDS/UL (ref 149–390)
PMV BLD AUTO: 10.1 FL (ref 8.9–12.7)
POTASSIUM SERPL-SCNC: 4.1 MMOL/L (ref 3.5–5.3)
PROT SERPL-MCNC: 6.9 G/DL (ref 6.4–8.2)
PSA SERPL-MCNC: 4.1 NG/ML (ref 0–4)
RBC # BLD AUTO: 3.86 MILLION/UL (ref 3.88–5.62)
SODIUM SERPL-SCNC: 142 MMOL/L (ref 136–145)
WBC # BLD AUTO: 7.02 THOUSAND/UL (ref 4.31–10.16)

## 2021-06-09 PROCEDURE — 85025 COMPLETE CBC W/AUTO DIFF WBC: CPT

## 2021-06-09 PROCEDURE — 84153 ASSAY OF PSA TOTAL: CPT

## 2021-06-09 PROCEDURE — 36415 COLL VENOUS BLD VENIPUNCTURE: CPT

## 2021-06-09 PROCEDURE — 80053 COMPREHEN METABOLIC PANEL: CPT

## 2021-06-09 RX ORDER — TAMSULOSIN HYDROCHLORIDE 0.4 MG/1
0.4 CAPSULE ORAL
Qty: 90 CAPSULE | Refills: 2 | Status: SHIPPED | OUTPATIENT
Start: 2021-06-09 | End: 2021-10-12 | Stop reason: HOSPADM

## 2021-06-09 RX ORDER — FINASTERIDE 5 MG/1
5 TABLET, FILM COATED ORAL DAILY
Qty: 90 TABLET | Refills: 2 | Status: SHIPPED | OUTPATIENT
Start: 2021-06-09 | End: 2022-03-28 | Stop reason: SDUPTHER

## 2021-06-09 NOTE — TELEPHONE ENCOUNTER
An Auto-fax Refill Request for Finasteride 5mg and Tamsulosin 0 4mg was received from [facility] mail order pharmacy  The patient has an upcoming office visit scheduled for 11/16/21 with Dr Elba Booth in the [place] [address] location but will run out of medication until then    Request for same, 90 day supply with 2 refills was queued and forwarded to the Advanced Practitioner covering the [place] [address] location for approval

## 2021-06-10 ENCOUNTER — HOSPITAL ENCOUNTER (OUTPATIENT)
Dept: INFUSION CENTER | Facility: CLINIC | Age: 81
Discharge: HOME | End: 2021-06-10
Payer: COMMERCIAL

## 2021-06-10 VITALS
SYSTOLIC BLOOD PRESSURE: 175 MMHG | RESPIRATION RATE: 18 BRPM | DIASTOLIC BLOOD PRESSURE: 61 MMHG | HEART RATE: 58 BPM | TEMPERATURE: 99.6 F

## 2021-06-10 DIAGNOSIS — C61 PROSTATE CANCER (HCC): Primary | ICD-10-CM

## 2021-06-10 PROCEDURE — 96402 CHEMO HORMON ANTINEOPL SQ/IM: CPT

## 2021-06-10 RX ADMIN — LEUPROLIDE ACETATE 22.5 MG: KIT SUBCUTANEOUS at 14:17

## 2021-06-10 NOTE — PROGRESS NOTES
Eligard given as ordered  Pt offers no complaints  Pt is aware of next injection appointment   Declined AVS

## 2021-07-07 ENCOUNTER — TELEPHONE (OUTPATIENT)
Dept: HEMATOLOGY ONCOLOGY | Facility: CLINIC | Age: 81
End: 2021-07-07

## 2021-07-07 ENCOUNTER — APPOINTMENT (OUTPATIENT)
Dept: LAB | Facility: CLINIC | Age: 81
End: 2021-07-07
Payer: COMMERCIAL

## 2021-07-07 ENCOUNTER — OFFICE VISIT (OUTPATIENT)
Dept: HEMATOLOGY ONCOLOGY | Facility: CLINIC | Age: 81
End: 2021-07-07
Payer: COMMERCIAL

## 2021-07-07 VITALS
DIASTOLIC BLOOD PRESSURE: 81 MMHG | BODY MASS INDEX: 26.98 KG/M2 | RESPIRATION RATE: 17 BRPM | OXYGEN SATURATION: 98 % | WEIGHT: 146.6 LBS | HEIGHT: 62 IN | TEMPERATURE: 96.1 F | HEART RATE: 54 BPM | SYSTOLIC BLOOD PRESSURE: 140 MMHG

## 2021-07-07 DIAGNOSIS — C61 PROSTATE CANCER (HCC): ICD-10-CM

## 2021-07-07 DIAGNOSIS — C61 PROSTATE CANCER (HCC): Primary | ICD-10-CM

## 2021-07-07 LAB — PSA SERPL-MCNC: 5 NG/ML (ref 0–4)

## 2021-07-07 PROCEDURE — 99214 OFFICE O/P EST MOD 30 MIN: CPT | Performed by: PHYSICIAN ASSISTANT

## 2021-07-07 PROCEDURE — 1036F TOBACCO NON-USER: CPT | Performed by: PHYSICIAN ASSISTANT

## 2021-07-07 PROCEDURE — 1160F RVW MEDS BY RX/DR IN RCRD: CPT | Performed by: PHYSICIAN ASSISTANT

## 2021-07-07 PROCEDURE — 84153 ASSAY OF PSA TOTAL: CPT

## 2021-07-07 NOTE — PROGRESS NOTES
Hematology/Oncology Outpatient Follow-up  Keven Peralta 78 y o  male 1940 [id number]    Date:  7/7/2021      Assessment and Plan:  1  Prostate cancer [facility])  64-year-old male presents for follow-up regarding history of prostate cancer  He has been on androgen deprivation therapy alone since approximately February 2020  It was advised multiple times for addition of other hormonal manipulation therapy such as Zytiga or Berton Keepers  Patient and daughter had deferred this  Patient now showing signs of increased PSA  This is increased to 4 1  I showed the actual levels and in the result tab to patient and his daughter  Showing that at time of being referred to our office his PSA was at 5 2, had decrease in the 1 range and now has increased to 4 1  Very much encouraged at this time addition of Zytiga or Berton Keepers  Patient is still hesitant to do this  He feels that the PSA level is possibly increase due to 1 month delay of androgen deprivation therapy as well as possible stress  Reviewed that this is unlikely  Cameron agreement was that he will have a repeat PSA today  If PSA is the same or increased patient needs to be on another therapy in addition to androgen deprivation  Will call patient's daughter with results when available with further planning  Reviewed that he would need to come back into the office sooner than 3 months to have consent obtained  They are not interested in signing for consent today  - PSA Total, Diagnostic;  Future      HPI:  64-year-old male presents for follow-up for prostate cancer      In 2010 patient was diagnosed with low risk Spencer score 6 prostate cancer in the [address] was given opportunity to be in treatment but due to low risk this was deferred      In February 2020 patient had no evidence of metastatic disease on chest abdomen pelvis imaging   There is thickening of bladder likely due to chronic bladder outlet syndrome due to obstruction from enlarged prostate  Adrian Ravi is also a large subcutaneous fluid cystic structure in the mid line lower back measuring 9 2 x 3 8 x 6 6 cm       Bone scan also completed February 2020 and was negative      MRI of the prostate completed in March 2020 showed very low likelihood of cancer present in the prostate      Patient has been receiving Lupron every 3 months  Due to shortage of Lupron he is now receiving Eligard  Interval history: Patient was due for Eligard in early May 2021  He missed this and had next dose in June 2021  His daughter states that he was hesitant to go for shot, resulting in delay, due to painfulness of the injection in the arm in Feb 2021  Denies any pain  ROS: Review of Systems   Constitutional: Negative for appetite change, chills, fatigue, fever and unexpected weight change  HENT: Negative for mouth sores and nosebleeds  Respiratory: Negative for cough and shortness of breath  Cardiovascular: Negative for chest pain, palpitations and leg swelling  Gastrointestinal: Negative for abdominal pain, constipation (using colace with benefit ), diarrhea, nausea and vomiting  Genitourinary: Negative for difficulty urinating, dysuria and hematuria  Nocturia    Musculoskeletal: Negative for arthralgias  Skin: Negative  Neurological: Negative for dizziness, weakness, light-headedness, numbness and headaches  Hematological: Negative  Psychiatric/Behavioral: Negative          Past Medical History:   Diagnosis Date    Hypertension     Prostate cancer ([address] )     no surgery required       Past Surgical History:   Procedure Laterality Date    HERNIA REPAIR         Social History     Socioeconomic History    Marital status: Single     Spouse name: None    Number of children: None    Years of education: None    Highest education level: None   Occupational History    None   Tobacco Use    Smoking status: Never Smoker    Smokeless tobacco: Never Used   Vaping Use    Vaping Use: Never used   Substance and Sexual Activity    Alcohol use: Never    Drug use: Never    Sexual activity: None   Other Topics Concern    None   Social History Narrative    Consumes 1 cup of coffee per day     Social Determinants of Health     Financial Resource Strain:     Difficulty of Paying Living Expenses:    Food Insecurity:     Worried About Running Out of Food in the Last Year:     [address] in the Last Year:    Transportation Needs:     Lack of Transportation (Medical):      Lack of Transportation (Non-Medical):    Physical Activity:     Days of Exercise per Week:     Minutes of Exercise per Session:    Stress:     Feeling of Stress :    Social Connections:     Frequency of Communication with Friends and Family:     Frequency of Social Gatherings with Friends and Family:     Attends Scientologist Services:     Active Member of Clubs or Organizations:     Attends Club or Organization Meetings:     Marital Status:    Intimate Partner Violence:     Fear of Current or Ex-Partner:     Emotionally Abused:     Physically Abused:     Sexually Abused:        Family History   Problem Relation Age of Onset    No Known Problems Mother     No Known Problems Father        No Known Allergies      Current Outpatient Medications:     finasteride (PROSCAR) 5 mg tablet, Take 1 tablet (5 mg total) by mouth daily, Disp: 90 tablet, Rfl: 2    ramipril (ALTACE) 10 MG capsule, TAKE 1 CAPSULE BY MOUTH DAILY, Disp: 30 capsule, Rfl: 5    tamsulosin (FLOMAX) 0 4 mg, Take 1 capsule (0 4 mg total) by mouth daily with dinner, Disp: 90 capsule, Rfl: 2    docusate sodium (COLACE) 100 mg capsule, Take 1 capsule (100 mg total) by mouth 2 (two) times a day (Patient not taking: Reported on 7/1/2021), Disp: 60 capsule, Rfl: 0      Physical Exam:  /81 (BP Location: Left arm, Patient Position: Sitting, Cuff Size: Adult)   Pulse (!) 54   Temp (!) 96 1 °F (35 6 °C)   Resp 17   Ht 5' 2" (1 575 m)   Wt 66 5 kg (146 lb 9 6 oz)   SpO2 98%   BMI 26 81 kg/m²     Physical Exam  Constitutional:       General: He is not in acute distress  Appearance: He is well-developed  He is not ill-appearing  HENT:      Head: Normocephalic and atraumatic  Eyes:      General: No scleral icterus  Conjunctiva/sclera: Conjunctivae normal    Cardiovascular:      Rate and Rhythm: Normal rate and regular rhythm  Heart sounds: Normal heart sounds  No murmur heard  Pulmonary:      Effort: Pulmonary effort is normal  No respiratory distress  Breath sounds: Normal breath sounds  Abdominal:      Palpations: Abdomen is soft  Tenderness: There is no abdominal tenderness  Musculoskeletal:         General: No tenderness  Normal range of motion  Cervical back: Normal range of motion and neck supple  Right lower leg: No edema  Left lower leg: No edema  Lymphadenopathy:      Cervical: No cervical adenopathy  Skin:     General: Skin is warm and dry  Neurological:      Mental Status: He is alert and oriented to person, place, and time  Cranial Nerves: No cranial nerve deficit  Psychiatric:         Mood and Affect: Mood normal          Behavior: Behavior normal        Labs:  Lab Results   Component Value Date    WBC 7 02 06/09/2021    HGB 11 9 (L) 06/09/2021    HCT 37 3 06/09/2021    MCV 97 06/09/2021     06/09/2021     Lab Results   Component Value Date    K 4 1 06/09/2021     06/09/2021    CO2 28 06/09/2021    BUN 13 06/09/2021    CREATININE 0 94 06/09/2021    GLUF 103 (H) 05/19/2020    CALCIUM 9 2 06/09/2021    CORRECTEDCA 9 7 06/09/2021    AST 10 06/09/2021    ALT 17 06/09/2021    ALKPHOS 60 06/09/2021    EGFR 75 06/09/2021     Patient voiced understanding and agreement in the above discussion  Aware to contact our office with questions/symptoms in the interim  This note has been generated by voice recognition software system    Therefore, there may be spelling, grammar, and or syntax errors  Please contact if questions arise

## 2021-07-08 DIAGNOSIS — C61 PROSTATE CANCER (HCC): Primary | ICD-10-CM

## 2021-07-08 NOTE — PROGRESS NOTES
Patient is here with his daughter  PSA has increased to 9  My physician assistant had discussed this with his daughter yesterday and patient was coming here to have informed consent for Zytiga plus prednisone or Xtandi like medication  He wants his PSA checked again in a few weeks and then he will decide  I also mentioned about scan like Axumin PET scan  After some discussion the decision was that he will have blood tests in 6 weeks and visit in 8 weeks and in between he might go for PET scan

## 2021-08-17 DIAGNOSIS — I10 ESSENTIAL HYPERTENSION: ICD-10-CM

## 2021-08-17 DIAGNOSIS — C61 PROSTATE CANCER (HCC): ICD-10-CM

## 2021-08-17 RX ORDER — RAMIPRIL 10 MG/1
10 CAPSULE ORAL DAILY
Qty: 30 CAPSULE | Refills: 5 | Status: SHIPPED | OUTPATIENT
Start: 2021-08-17 | End: 2021-10-12 | Stop reason: HOSPADM

## 2021-08-17 NOTE — TELEPHONE ENCOUNTER
Requested medication(s) are due for refill today: Yes  Patient has already received a courtesy refill: No  Other reason request has been forwarded to provider: Pt is due for f/u appt

## 2021-09-01 ENCOUNTER — TELEPHONE (OUTPATIENT)
Dept: UROLOGY | Facility: AMBULATORY SURGERY CENTER | Age: 81
End: 2021-09-01

## 2021-09-01 NOTE — TELEPHONE ENCOUNTER
Patient managed by Radhika Lei  Daughter is calling to say she prefers to have patient seen by MD due to some medication changes  Also his psa is bit more elevated   Requesting sooner appointment with MD

## 2021-09-02 ENCOUNTER — APPOINTMENT (OUTPATIENT)
Dept: LAB | Facility: HOSPITAL | Age: 81
End: 2021-09-02
Attending: INTERNAL MEDICINE
Payer: COMMERCIAL

## 2021-09-02 ENCOUNTER — HOSPITAL ENCOUNTER (OUTPATIENT)
Dept: INFUSION CENTER | Facility: CLINIC | Age: 81
Discharge: HOME | End: 2021-09-02
Payer: COMMERCIAL

## 2021-09-02 VITALS
TEMPERATURE: 98.4 F | HEART RATE: 57 BPM | RESPIRATION RATE: 18 BRPM | WEIGHT: 148.7 LBS | SYSTOLIC BLOOD PRESSURE: 187 MMHG | BODY MASS INDEX: 27.2 KG/M2 | DIASTOLIC BLOOD PRESSURE: 70 MMHG

## 2021-09-02 DIAGNOSIS — C61 PROSTATE CANCER (HCC): Primary | ICD-10-CM

## 2021-09-02 DIAGNOSIS — C61 PROSTATE CANCER (HCC): ICD-10-CM

## 2021-09-02 LAB
ALBUMIN SERPL BCP-MCNC: 3.6 G/DL (ref 3.5–5)
ALP SERPL-CCNC: 58 U/L (ref 46–116)
ALT SERPL W P-5'-P-CCNC: 15 U/L (ref 12–78)
ANION GAP SERPL CALCULATED.3IONS-SCNC: 9 MMOL/L (ref 4–13)
AST SERPL W P-5'-P-CCNC: 10 U/L (ref 5–45)
BASOPHILS # BLD AUTO: 0.04 THOUSANDS/ΜL (ref 0–0.1)
BASOPHILS NFR BLD AUTO: 1 % (ref 0–1)
BILIRUB SERPL-MCNC: 0.4 MG/DL (ref 0.2–1)
BUN SERPL-MCNC: 17 MG/DL (ref 5–25)
CALCIUM SERPL-MCNC: 9.1 MG/DL (ref 8.3–10.1)
CHLORIDE SERPL-SCNC: 104 MMOL/L (ref 100–108)
CO2 SERPL-SCNC: 28 MMOL/L (ref 21–32)
CREAT SERPL-MCNC: 0.99 MG/DL (ref 0.6–1.3)
EOSINOPHIL # BLD AUTO: 0.13 THOUSAND/ΜL (ref 0–0.61)
EOSINOPHIL NFR BLD AUTO: 2 % (ref 0–6)
ERYTHROCYTE [DISTWIDTH] IN BLOOD BY AUTOMATED COUNT: 12.9 % (ref 11.6–15.1)
GFR SERPL CREATININE-BSD FRML MDRD: 70 ML/MIN/1.73SQ M
GLUCOSE SERPL-MCNC: 85 MG/DL (ref 65–140)
HCT VFR BLD AUTO: 38.2 % (ref 36.5–49.3)
HGB BLD-MCNC: 12.1 G/DL (ref 12–17)
IMM GRANULOCYTES # BLD AUTO: 0.02 THOUSAND/UL (ref 0–0.2)
IMM GRANULOCYTES NFR BLD AUTO: 0 % (ref 0–2)
LYMPHOCYTES # BLD AUTO: 2.63 THOUSANDS/ΜL (ref 0.6–4.47)
LYMPHOCYTES NFR BLD AUTO: 40 % (ref 14–44)
MCH RBC QN AUTO: 31.4 PG (ref 26.8–34.3)
MCHC RBC AUTO-ENTMCNC: 31.7 G/DL (ref 31.4–37.4)
MCV RBC AUTO: 99 FL (ref 82–98)
MONOCYTES # BLD AUTO: 0.65 THOUSAND/ΜL (ref 0.17–1.22)
MONOCYTES NFR BLD AUTO: 10 % (ref 4–12)
NEUTROPHILS # BLD AUTO: 3.12 THOUSANDS/ΜL (ref 1.85–7.62)
NEUTS SEG NFR BLD AUTO: 47 % (ref 43–75)
NRBC BLD AUTO-RTO: 0 /100 WBCS
PLATELET # BLD AUTO: 233 THOUSANDS/UL (ref 149–390)
PMV BLD AUTO: 10.8 FL (ref 8.9–12.7)
POTASSIUM SERPL-SCNC: 4.6 MMOL/L (ref 3.5–5.3)
PROT SERPL-MCNC: 7.6 G/DL (ref 6.4–8.2)
PSA SERPL-MCNC: 6.5 NG/ML (ref 0–4)
RBC # BLD AUTO: 3.85 MILLION/UL (ref 3.88–5.62)
SODIUM SERPL-SCNC: 141 MMOL/L (ref 136–145)
WBC # BLD AUTO: 6.59 THOUSAND/UL (ref 4.31–10.16)

## 2021-09-02 PROCEDURE — 36415 COLL VENOUS BLD VENIPUNCTURE: CPT

## 2021-09-02 PROCEDURE — 84153 ASSAY OF PSA TOTAL: CPT

## 2021-09-02 PROCEDURE — 96402 CHEMO HORMON ANTINEOPL SQ/IM: CPT

## 2021-09-02 PROCEDURE — 80053 COMPREHEN METABOLIC PANEL: CPT

## 2021-09-02 PROCEDURE — 85025 COMPLETE CBC W/AUTO DIFF WBC: CPT

## 2021-09-02 RX ADMIN — LEUPROLIDE ACETATE 22.5 MG: KIT SUBCUTANEOUS at 14:23

## 2021-09-02 NOTE — TELEPHONE ENCOUNTER
Spoke to pts daughter and pt they are very up set and would only like to see MD  I informed them twice on the structure of our scheduled and how good our AP's are but they had no interest

## 2021-09-16 ENCOUNTER — APPOINTMENT (EMERGENCY)
Dept: CT IMAGING | Facility: HOSPITAL | Age: 81
DRG: 064 | End: 2021-09-16
Payer: COMMERCIAL

## 2021-09-16 ENCOUNTER — APPOINTMENT (INPATIENT)
Dept: NON INVASIVE DIAGNOSTICS | Facility: HOSPITAL | Age: 81
DRG: 064 | End: 2021-09-16
Payer: COMMERCIAL

## 2021-09-16 ENCOUNTER — HOSPITAL ENCOUNTER (EMERGENCY)
Facility: HOSPITAL | Age: 81
DRG: 064 | End: 2021-09-16
Attending: EMERGENCY MEDICINE
Payer: COMMERCIAL

## 2021-09-16 ENCOUNTER — HOSPITAL ENCOUNTER (INPATIENT)
Facility: HOSPITAL | Age: 81
LOS: 26 days | Discharge: NURSING, REHAB OR LONG-TERM CARE | DRG: 064 | End: 2021-10-12
Attending: EMERGENCY MEDICINE | Admitting: EMERGENCY MEDICINE
Payer: COMMERCIAL

## 2021-09-16 ENCOUNTER — APPOINTMENT (INPATIENT)
Dept: RADIOLOGY | Facility: HOSPITAL | Age: 81
DRG: 064 | End: 2021-09-16
Payer: COMMERCIAL

## 2021-09-16 VITALS
TEMPERATURE: 97.7 F | HEART RATE: 48 BPM | DIASTOLIC BLOOD PRESSURE: 67 MMHG | OXYGEN SATURATION: 97 % | WEIGHT: 145.28 LBS | BODY MASS INDEX: 26.57 KG/M2 | RESPIRATION RATE: 18 BRPM | SYSTOLIC BLOOD PRESSURE: 158 MMHG

## 2021-09-16 DIAGNOSIS — I63.9 STROKE (HCC): Primary | ICD-10-CM

## 2021-09-16 DIAGNOSIS — Z93.1 S/P PERCUTANEOUS ENDOSCOPIC GASTROSTOMY (PEG) TUBE PLACEMENT (HCC): ICD-10-CM

## 2021-09-16 DIAGNOSIS — I10 PRIMARY HYPERTENSION: ICD-10-CM

## 2021-09-16 DIAGNOSIS — D64.9 ANEMIA, UNSPECIFIED TYPE: ICD-10-CM

## 2021-09-16 DIAGNOSIS — I60.9 SUBARACHNOID BLEED (HCC): ICD-10-CM

## 2021-09-16 DIAGNOSIS — I10 UNCONTROLLED HYPERTENSION: ICD-10-CM

## 2021-09-16 DIAGNOSIS — E53.8 LOW FOLATE: ICD-10-CM

## 2021-09-16 DIAGNOSIS — G47.33 OBSTRUCTIVE SLEEP APNEA (ADULT) (PEDIATRIC): ICD-10-CM

## 2021-09-16 DIAGNOSIS — R13.10 DYSPHAGIA: ICD-10-CM

## 2021-09-16 DIAGNOSIS — I61.9 CEREBRAL HEMORRHAGE (HCC): ICD-10-CM

## 2021-09-16 DIAGNOSIS — C61 PROSTATE CANCER (HCC): ICD-10-CM

## 2021-09-16 DIAGNOSIS — I61.9 ICH (INTRACEREBRAL HEMORRHAGE) (HCC): Primary | ICD-10-CM

## 2021-09-16 DIAGNOSIS — K92.2 GI BLEED: ICD-10-CM

## 2021-09-16 DIAGNOSIS — I63.9 ACUTE CVA (CEREBROVASCULAR ACCIDENT) (HCC): ICD-10-CM

## 2021-09-16 DIAGNOSIS — R73.9 HYPERGLYCEMIA: ICD-10-CM

## 2021-09-16 LAB
ANION GAP SERPL CALCULATED.3IONS-SCNC: 3 MMOL/L (ref 4–13)
ANION GAP SERPL CALCULATED.3IONS-SCNC: 7 MMOL/L (ref 4–13)
APTT PPP: 29 SECONDS (ref 23–37)
ATRIAL RATE: 250 BPM
BASE EX.OXY STD BLDV CALC-SCNC: 58.2 % (ref 60–80)
BASE EXCESS BLDV CALC-SCNC: -0.5 MMOL/L
BASOPHILS # BLD AUTO: 0.03 THOUSANDS/ΜL (ref 0–0.1)
BASOPHILS NFR BLD AUTO: 0 % (ref 0–1)
BUN SERPL-MCNC: 15 MG/DL (ref 5–25)
BUN SERPL-MCNC: 19 MG/DL (ref 5–25)
CALCIUM SERPL-MCNC: 8.7 MG/DL (ref 8.3–10.1)
CALCIUM SERPL-MCNC: 8.7 MG/DL (ref 8.3–10.1)
CHLORIDE SERPL-SCNC: 104 MMOL/L (ref 100–108)
CHLORIDE SERPL-SCNC: 105 MMOL/L (ref 100–108)
CHOLEST SERPL-MCNC: 218 MG/DL (ref 50–200)
CO2 SERPL-SCNC: 28 MMOL/L (ref 21–32)
CO2 SERPL-SCNC: 28 MMOL/L (ref 21–32)
CREAT SERPL-MCNC: 0.8 MG/DL (ref 0.6–1.3)
CREAT SERPL-MCNC: 0.81 MG/DL (ref 0.6–1.3)
EOSINOPHIL # BLD AUTO: 0.02 THOUSAND/ΜL (ref 0–0.61)
EOSINOPHIL NFR BLD AUTO: 0 % (ref 0–6)
ERYTHROCYTE [DISTWIDTH] IN BLOOD BY AUTOMATED COUNT: 12.7 % (ref 11.6–15.1)
ERYTHROCYTE [DISTWIDTH] IN BLOOD BY AUTOMATED COUNT: 12.9 % (ref 11.6–15.1)
EST. AVERAGE GLUCOSE BLD GHB EST-MCNC: 148 MG/DL
GFR SERPL CREATININE-BSD FRML MDRD: 82 ML/MIN/1.73SQ M
GFR SERPL CREATININE-BSD FRML MDRD: 83 ML/MIN/1.73SQ M
GLUCOSE SERPL-MCNC: 150 MG/DL (ref 65–140)
GLUCOSE SERPL-MCNC: 159 MG/DL (ref 65–140)
GLUCOSE SERPL-MCNC: 174 MG/DL (ref 65–140)
HBA1C MFR BLD: 6.8 %
HCO3 BLDV-SCNC: 26.3 MMOL/L (ref 24–30)
HCT VFR BLD AUTO: 36 % (ref 36.5–49.3)
HCT VFR BLD AUTO: 36.3 % (ref 36.5–49.3)
HDLC SERPL-MCNC: 61 MG/DL
HGB BLD-MCNC: 11.9 G/DL (ref 12–17)
HGB BLD-MCNC: 11.9 G/DL (ref 12–17)
IMM GRANULOCYTES # BLD AUTO: 0.04 THOUSAND/UL (ref 0–0.2)
IMM GRANULOCYTES NFR BLD AUTO: 0 % (ref 0–2)
INR PPP: 0.99 (ref 0.84–1.19)
LDLC SERPL CALC-MCNC: 145 MG/DL (ref 0–100)
LYMPHOCYTES # BLD AUTO: 1.19 THOUSANDS/ΜL (ref 0.6–4.47)
LYMPHOCYTES NFR BLD AUTO: 11 % (ref 14–44)
MAGNESIUM SERPL-MCNC: 2.3 MG/DL (ref 1.6–2.6)
MCH RBC QN AUTO: 31.7 PG (ref 26.8–34.3)
MCH RBC QN AUTO: 32 PG (ref 26.8–34.3)
MCHC RBC AUTO-ENTMCNC: 32.8 G/DL (ref 31.4–37.4)
MCHC RBC AUTO-ENTMCNC: 33.1 G/DL (ref 31.4–37.4)
MCV RBC AUTO: 97 FL (ref 82–98)
MCV RBC AUTO: 97 FL (ref 82–98)
MONOCYTES # BLD AUTO: 0.45 THOUSAND/ΜL (ref 0.17–1.22)
MONOCYTES NFR BLD AUTO: 4 % (ref 4–12)
NASAL CANNULA: 2
NEUTROPHILS # BLD AUTO: 9.59 THOUSANDS/ΜL (ref 1.85–7.62)
NEUTS SEG NFR BLD AUTO: 85 % (ref 43–75)
NRBC BLD AUTO-RTO: 0 /100 WBCS
O2 CT BLDV-SCNC: 10.2 ML/DL
PCO2 BLDV: 52.6 MM HG (ref 42–50)
PH BLDV: 7.32 [PH] (ref 7.3–7.4)
PHOSPHATE SERPL-MCNC: 3.3 MG/DL (ref 2.3–4.1)
PLATELET # BLD AUTO: 226 THOUSANDS/UL (ref 149–390)
PLATELET # BLD AUTO: 245 THOUSANDS/UL (ref 149–390)
PMV BLD AUTO: 10.5 FL (ref 8.9–12.7)
PMV BLD AUTO: 10.7 FL (ref 8.9–12.7)
PO2 BLDV: 33.6 MM HG (ref 35–45)
POTASSIUM SERPL-SCNC: 4 MMOL/L (ref 3.5–5.3)
POTASSIUM SERPL-SCNC: 4.2 MMOL/L (ref 3.5–5.3)
PROTHROMBIN TIME: 12.8 SECONDS (ref 11.6–14.5)
QRS AXIS: 47 DEGREES
QRSD INTERVAL: 130 MS
QT INTERVAL: 446 MS
QTC INTERVAL: 434 MS
RBC # BLD AUTO: 3.72 MILLION/UL (ref 3.88–5.62)
RBC # BLD AUTO: 3.75 MILLION/UL (ref 3.88–5.62)
SARS-COV-2 RNA RESP QL NAA+PROBE: NEGATIVE
SODIUM SERPL-SCNC: 136 MMOL/L (ref 136–145)
SODIUM SERPL-SCNC: 139 MMOL/L (ref 136–145)
T WAVE AXIS: 56 DEGREES
TRIGL SERPL-MCNC: 59 MG/DL
TROPONIN I SERPL-MCNC: <0.02 NG/ML
VENTRICULAR RATE: 57 BPM
WBC # BLD AUTO: 11.32 THOUSAND/UL (ref 4.31–10.16)
WBC # BLD AUTO: 11.96 THOUSAND/UL (ref 4.31–10.16)

## 2021-09-16 PROCEDURE — 99232 SBSQ HOSP IP/OBS MODERATE 35: CPT | Performed by: PHYSICIAN ASSISTANT

## 2021-09-16 PROCEDURE — 82948 REAGENT STRIP/BLOOD GLUCOSE: CPT

## 2021-09-16 PROCEDURE — 80061 LIPID PANEL: CPT | Performed by: PHYSICIAN ASSISTANT

## 2021-09-16 PROCEDURE — 80048 BASIC METABOLIC PNL TOTAL CA: CPT | Performed by: PHYSICIAN ASSISTANT

## 2021-09-16 PROCEDURE — U0005 INFEC AGEN DETEC AMPLI PROBE: HCPCS | Performed by: EMERGENCY MEDICINE

## 2021-09-16 PROCEDURE — 83036 HEMOGLOBIN GLYCOSYLATED A1C: CPT | Performed by: PHYSICIAN ASSISTANT

## 2021-09-16 PROCEDURE — 93306 TTE W/DOPPLER COMPLETE: CPT | Performed by: INTERNAL MEDICINE

## 2021-09-16 PROCEDURE — 85025 COMPLETE CBC W/AUTO DIFF WBC: CPT | Performed by: PHYSICIAN ASSISTANT

## 2021-09-16 PROCEDURE — 96375 TX/PRO/DX INJ NEW DRUG ADDON: CPT

## 2021-09-16 PROCEDURE — 99291 CRITICAL CARE FIRST HOUR: CPT

## 2021-09-16 PROCEDURE — 70496 CT ANGIOGRAPHY HEAD: CPT

## 2021-09-16 PROCEDURE — 70498 CT ANGIOGRAPHY NECK: CPT

## 2021-09-16 PROCEDURE — 85027 COMPLETE CBC AUTOMATED: CPT | Performed by: EMERGENCY MEDICINE

## 2021-09-16 PROCEDURE — 93306 TTE W/DOPPLER COMPLETE: CPT

## 2021-09-16 PROCEDURE — 99291 CRITICAL CARE FIRST HOUR: CPT | Performed by: EMERGENCY MEDICINE

## 2021-09-16 PROCEDURE — 82805 BLOOD GASES W/O2 SATURATION: CPT | Performed by: PHYSICIAN ASSISTANT

## 2021-09-16 PROCEDURE — U0003 INFECTIOUS AGENT DETECTION BY NUCLEIC ACID (DNA OR RNA); SEVERE ACUTE RESPIRATORY SYNDROME CORONAVIRUS 2 (SARS-COV-2) (CORONAVIRUS DISEASE [COVID-19]), AMPLIFIED PROBE TECHNIQUE, MAKING USE OF HIGH THROUGHPUT TECHNOLOGIES AS DESCRIBED BY CMS-2020-01-R: HCPCS | Performed by: EMERGENCY MEDICINE

## 2021-09-16 PROCEDURE — 85610 PROTHROMBIN TIME: CPT | Performed by: EMERGENCY MEDICINE

## 2021-09-16 PROCEDURE — 70553 MRI BRAIN STEM W/O & W/DYE: CPT

## 2021-09-16 PROCEDURE — 36415 COLL VENOUS BLD VENIPUNCTURE: CPT | Performed by: EMERGENCY MEDICINE

## 2021-09-16 PROCEDURE — 84100 ASSAY OF PHOSPHORUS: CPT | Performed by: PHYSICIAN ASSISTANT

## 2021-09-16 PROCEDURE — 96365 THER/PROPH/DIAG IV INF INIT: CPT

## 2021-09-16 PROCEDURE — 93010 ELECTROCARDIOGRAM REPORT: CPT | Performed by: INTERNAL MEDICINE

## 2021-09-16 PROCEDURE — 92610 EVALUATE SWALLOWING FUNCTION: CPT

## 2021-09-16 PROCEDURE — 99223 1ST HOSP IP/OBS HIGH 75: CPT | Performed by: EMERGENCY MEDICINE

## 2021-09-16 PROCEDURE — 99223 1ST HOSP IP/OBS HIGH 75: CPT | Performed by: PSYCHIATRY & NEUROLOGY

## 2021-09-16 PROCEDURE — A9585 GADOBUTROL INJECTION: HCPCS | Performed by: PHYSICIAN ASSISTANT

## 2021-09-16 PROCEDURE — 83735 ASSAY OF MAGNESIUM: CPT | Performed by: PHYSICIAN ASSISTANT

## 2021-09-16 PROCEDURE — G1004 CDSM NDSC: HCPCS

## 2021-09-16 PROCEDURE — 84484 ASSAY OF TROPONIN QUANT: CPT | Performed by: EMERGENCY MEDICINE

## 2021-09-16 PROCEDURE — 93005 ELECTROCARDIOGRAM TRACING: CPT

## 2021-09-16 PROCEDURE — 80048 BASIC METABOLIC PNL TOTAL CA: CPT | Performed by: EMERGENCY MEDICINE

## 2021-09-16 PROCEDURE — 85730 THROMBOPLASTIN TIME PARTIAL: CPT | Performed by: EMERGENCY MEDICINE

## 2021-09-16 RX ORDER — MORPHINE SULFATE 4 MG/ML
4 INJECTION, SOLUTION INTRAMUSCULAR; INTRAVENOUS ONCE
Status: COMPLETED | OUTPATIENT
Start: 2021-09-16 | End: 2021-09-16

## 2021-09-16 RX ORDER — LABETALOL 20 MG/4 ML (5 MG/ML) INTRAVENOUS SYRINGE
20 ONCE
Status: COMPLETED | OUTPATIENT
Start: 2021-09-16 | End: 2021-09-16

## 2021-09-16 RX ORDER — SODIUM CHLORIDE 9 MG/ML
100 INJECTION, SOLUTION INTRAVENOUS CONTINUOUS
Status: DISCONTINUED | OUTPATIENT
Start: 2021-09-16 | End: 2021-09-16

## 2021-09-16 RX ORDER — ONDANSETRON 2 MG/ML
4 INJECTION INTRAMUSCULAR; INTRAVENOUS EVERY 6 HOURS PRN
Status: DISCONTINUED | OUTPATIENT
Start: 2021-09-16 | End: 2021-10-12 | Stop reason: HOSPADM

## 2021-09-16 RX ORDER — ONDANSETRON 2 MG/ML
INJECTION INTRAMUSCULAR; INTRAVENOUS
Status: DISCONTINUED
Start: 2021-09-16 | End: 2021-09-16 | Stop reason: HOSPADM

## 2021-09-16 RX ORDER — LABETALOL 20 MG/4 ML (5 MG/ML) INTRAVENOUS SYRINGE
10 EVERY 4 HOURS PRN
Status: DISCONTINUED | OUTPATIENT
Start: 2021-09-16 | End: 2021-09-18

## 2021-09-16 RX ADMIN — LABETALOL 20 MG/4 ML (5 MG/ML) INTRAVENOUS SYRINGE 10 MG: at 21:15

## 2021-09-16 RX ADMIN — MORPHINE SULFATE 4 MG: 4 INJECTION INTRAVENOUS at 02:41

## 2021-09-16 RX ADMIN — LABETALOL 20 MG/4 ML (5 MG/ML) INTRAVENOUS SYRINGE 20 MG: at 03:00

## 2021-09-16 RX ADMIN — GADOBUTROL 6 ML: 604.72 INJECTION INTRAVENOUS at 21:32

## 2021-09-16 RX ADMIN — SODIUM CHLORIDE 5 MG/HR: 0.9 INJECTION, SOLUTION INTRAVENOUS at 03:11

## 2021-09-16 RX ADMIN — SODIUM CHLORIDE 100 ML/HR: 0.9 INJECTION, SOLUTION INTRAVENOUS at 05:28

## 2021-09-16 RX ADMIN — NICARDIPINE HYDROCHLORIDE 7.5 MG/HR: 2.5 INJECTION, SOLUTION INTRAVENOUS at 07:30

## 2021-09-16 RX ADMIN — IOHEXOL 85 ML: 350 INJECTION, SOLUTION INTRAVENOUS at 02:25

## 2021-09-16 RX ADMIN — NICARDIPINE HYDROCHLORIDE 5 MG/HR: 2.5 INJECTION, SOLUTION INTRAVENOUS at 20:10

## 2021-09-16 RX ADMIN — LEVETIRACETAM 500 MG: 100 INJECTION, SOLUTION INTRAVENOUS at 06:00

## 2021-09-16 RX ADMIN — NICARDIPINE HYDROCHLORIDE 5 MG/HR: 2.5 INJECTION, SOLUTION INTRAVENOUS at 05:00

## 2021-09-16 RX ADMIN — LEVETIRACETAM 500 MG: 100 INJECTION, SOLUTION INTRAVENOUS at 20:10

## 2021-09-17 ENCOUNTER — TELEPHONE (OUTPATIENT)
Dept: NEUROSURGERY | Facility: CLINIC | Age: 81
End: 2021-09-17

## 2021-09-17 ENCOUNTER — APPOINTMENT (INPATIENT)
Dept: RADIOLOGY | Facility: HOSPITAL | Age: 81
DRG: 064 | End: 2021-09-17
Payer: COMMERCIAL

## 2021-09-17 PROBLEM — E85.4 CEREBRAL AMYLOID ANGIOPATHY (HCC): Status: ACTIVE | Noted: 2021-09-17

## 2021-09-17 PROBLEM — I68.0 CEREBRAL AMYLOID ANGIOPATHY (CODE): Status: ACTIVE | Noted: 2021-09-17

## 2021-09-17 LAB
ALBUMIN SERPL BCP-MCNC: 2.9 G/DL (ref 3.5–5)
ALP SERPL-CCNC: 60 U/L (ref 46–116)
ALT SERPL W P-5'-P-CCNC: 13 U/L (ref 12–78)
ANION GAP SERPL CALCULATED.3IONS-SCNC: 4 MMOL/L (ref 4–13)
AST SERPL W P-5'-P-CCNC: 15 U/L (ref 5–45)
BASOPHILS # BLD AUTO: 0.03 THOUSANDS/ΜL (ref 0–0.1)
BASOPHILS NFR BLD AUTO: 0 % (ref 0–1)
BILIRUB DIRECT SERPL-MCNC: 0.18 MG/DL (ref 0–0.2)
BILIRUB SERPL-MCNC: 1.48 MG/DL (ref 0.2–1)
BUN SERPL-MCNC: 11 MG/DL (ref 5–25)
CALCIUM SERPL-MCNC: 8.8 MG/DL (ref 8.3–10.1)
CHLORIDE SERPL-SCNC: 105 MMOL/L (ref 100–108)
CO2 SERPL-SCNC: 28 MMOL/L (ref 21–32)
CREAT SERPL-MCNC: 0.78 MG/DL (ref 0.6–1.3)
EOSINOPHIL # BLD AUTO: 0.02 THOUSAND/ΜL (ref 0–0.61)
EOSINOPHIL NFR BLD AUTO: 0 % (ref 0–6)
ERYTHROCYTE [DISTWIDTH] IN BLOOD BY AUTOMATED COUNT: 12.8 % (ref 11.6–15.1)
GFR SERPL CREATININE-BSD FRML MDRD: 83 ML/MIN/1.73SQ M
GLUCOSE SERPL-MCNC: 137 MG/DL (ref 65–140)
GLUCOSE SERPL-MCNC: 141 MG/DL (ref 65–140)
GLUCOSE SERPL-MCNC: 174 MG/DL (ref 65–140)
HCT VFR BLD AUTO: 36.3 % (ref 36.5–49.3)
HGB BLD-MCNC: 11.6 G/DL (ref 12–17)
IMM GRANULOCYTES # BLD AUTO: 0.03 THOUSAND/UL (ref 0–0.2)
IMM GRANULOCYTES NFR BLD AUTO: 0 % (ref 0–2)
LYMPHOCYTES # BLD AUTO: 1.18 THOUSANDS/ΜL (ref 0.6–4.47)
LYMPHOCYTES NFR BLD AUTO: 13 % (ref 14–44)
MAGNESIUM SERPL-MCNC: 2.2 MG/DL (ref 1.6–2.6)
MCH RBC QN AUTO: 30.9 PG (ref 26.8–34.3)
MCHC RBC AUTO-ENTMCNC: 32 G/DL (ref 31.4–37.4)
MCV RBC AUTO: 97 FL (ref 82–98)
MONOCYTES # BLD AUTO: 0.75 THOUSAND/ΜL (ref 0.17–1.22)
MONOCYTES NFR BLD AUTO: 8 % (ref 4–12)
NEUTROPHILS # BLD AUTO: 6.89 THOUSANDS/ΜL (ref 1.85–7.62)
NEUTS SEG NFR BLD AUTO: 79 % (ref 43–75)
NRBC BLD AUTO-RTO: 0 /100 WBCS
PHOSPHATE SERPL-MCNC: 3.2 MG/DL (ref 2.3–4.1)
PLATELET # BLD AUTO: 243 THOUSANDS/UL (ref 149–390)
PMV BLD AUTO: 10.2 FL (ref 8.9–12.7)
POTASSIUM SERPL-SCNC: 3.9 MMOL/L (ref 3.5–5.3)
PROT SERPL-MCNC: 7 G/DL (ref 6.4–8.2)
RBC # BLD AUTO: 3.76 MILLION/UL (ref 3.88–5.62)
SODIUM SERPL-SCNC: 137 MMOL/L (ref 136–145)
WBC # BLD AUTO: 8.9 THOUSAND/UL (ref 4.31–10.16)

## 2021-09-17 PROCEDURE — 80076 HEPATIC FUNCTION PANEL: CPT

## 2021-09-17 PROCEDURE — 99222 1ST HOSP IP/OBS MODERATE 55: CPT | Performed by: NURSE PRACTITIONER

## 2021-09-17 PROCEDURE — 85025 COMPLETE CBC W/AUTO DIFF WBC: CPT | Performed by: PHYSICIAN ASSISTANT

## 2021-09-17 PROCEDURE — 82948 REAGENT STRIP/BLOOD GLUCOSE: CPT

## 2021-09-17 PROCEDURE — 83735 ASSAY OF MAGNESIUM: CPT | Performed by: PHYSICIAN ASSISTANT

## 2021-09-17 PROCEDURE — 97163 PT EVAL HIGH COMPLEX 45 MIN: CPT

## 2021-09-17 PROCEDURE — 99223 1ST HOSP IP/OBS HIGH 75: CPT | Performed by: NURSE PRACTITIONER

## 2021-09-17 PROCEDURE — G1004 CDSM NDSC: HCPCS

## 2021-09-17 PROCEDURE — 99233 SBSQ HOSP IP/OBS HIGH 50: CPT | Performed by: PSYCHIATRY & NEUROLOGY

## 2021-09-17 PROCEDURE — 84100 ASSAY OF PHOSPHORUS: CPT | Performed by: PHYSICIAN ASSISTANT

## 2021-09-17 PROCEDURE — 99223 1ST HOSP IP/OBS HIGH 75: CPT | Performed by: SURGERY

## 2021-09-17 PROCEDURE — 70450 CT HEAD/BRAIN W/O DYE: CPT

## 2021-09-17 PROCEDURE — 99291 CRITICAL CARE FIRST HOUR: CPT | Performed by: ANESTHESIOLOGY

## 2021-09-17 PROCEDURE — 97167 OT EVAL HIGH COMPLEX 60 MIN: CPT

## 2021-09-17 PROCEDURE — 80048 BASIC METABOLIC PNL TOTAL CA: CPT | Performed by: PHYSICIAN ASSISTANT

## 2021-09-17 RX ORDER — AMOXICILLIN 250 MG
1 CAPSULE ORAL
Status: DISCONTINUED | OUTPATIENT
Start: 2021-09-17 | End: 2021-10-05

## 2021-09-17 RX ORDER — AMOXICILLIN 250 MG
1 CAPSULE ORAL
Status: DISCONTINUED | OUTPATIENT
Start: 2021-09-17 | End: 2021-09-17

## 2021-09-17 RX ORDER — AMOXICILLIN 250 MG
1 CAPSULE ORAL
Status: DISCONTINUED | OUTPATIENT
Start: 2021-09-18 | End: 2021-09-17

## 2021-09-17 RX ORDER — POLYETHYLENE GLYCOL 3350 17 G/17G
17 POWDER, FOR SOLUTION ORAL ONCE
Status: DISCONTINUED | OUTPATIENT
Start: 2021-09-17 | End: 2021-09-17

## 2021-09-17 RX ORDER — LISINOPRIL 20 MG/1
40 TABLET ORAL DAILY
Status: DISCONTINUED | OUTPATIENT
Start: 2021-09-17 | End: 2021-09-17

## 2021-09-17 RX ORDER — SODIUM CHLORIDE 1000 MG
2 TABLET, SOLUBLE MISCELLANEOUS
Status: DISCONTINUED | OUTPATIENT
Start: 2021-09-17 | End: 2021-09-20

## 2021-09-17 RX ORDER — HYDRALAZINE HYDROCHLORIDE 20 MG/ML
5 INJECTION INTRAMUSCULAR; INTRAVENOUS EVERY 6 HOURS PRN
Status: DISCONTINUED | OUTPATIENT
Start: 2021-09-17 | End: 2021-09-18

## 2021-09-17 RX ORDER — ATORVASTATIN CALCIUM 40 MG/1
40 TABLET, FILM COATED ORAL
Status: DISCONTINUED | OUTPATIENT
Start: 2021-09-17 | End: 2021-10-07

## 2021-09-17 RX ORDER — ACETAMINOPHEN 325 MG/1
650 TABLET ORAL EVERY 6 HOURS PRN
Status: DISCONTINUED | OUTPATIENT
Start: 2021-09-17 | End: 2021-09-18

## 2021-09-17 RX ORDER — LISINOPRIL 20 MG/1
40 TABLET ORAL DAILY
Status: DISCONTINUED | OUTPATIENT
Start: 2021-09-18 | End: 2021-10-09

## 2021-09-17 RX ORDER — POLYETHYLENE GLYCOL 3350 17 G/17G
17 POWDER, FOR SOLUTION ORAL ONCE
Status: COMPLETED | OUTPATIENT
Start: 2021-09-17 | End: 2021-09-17

## 2021-09-17 RX ORDER — POTASSIUM CHLORIDE 14.9 MG/ML
20 INJECTION INTRAVENOUS ONCE
Status: DISCONTINUED | OUTPATIENT
Start: 2021-09-17 | End: 2021-09-17

## 2021-09-17 RX ADMIN — LEVETIRACETAM 500 MG: 100 INJECTION, SOLUTION INTRAVENOUS at 21:49

## 2021-09-17 RX ADMIN — LABETALOL 20 MG/4 ML (5 MG/ML) INTRAVENOUS SYRINGE 10 MG: at 03:46

## 2021-09-17 RX ADMIN — POLYETHYLENE GLYCOL 3350 17 G: 17 POWDER, FOR SOLUTION ORAL at 22:25

## 2021-09-17 RX ADMIN — Medication 2 G: at 17:45

## 2021-09-17 RX ADMIN — LEVETIRACETAM 500 MG: 100 INJECTION, SOLUTION INTRAVENOUS at 08:11

## 2021-09-17 RX ADMIN — HYDRALAZINE HYDROCHLORIDE 5 MG: 20 INJECTION, SOLUTION INTRAMUSCULAR; INTRAVENOUS at 23:09

## 2021-09-17 RX ADMIN — LISINOPRIL 40 MG: 20 TABLET ORAL at 11:25

## 2021-09-17 RX ADMIN — NICARDIPINE HYDROCHLORIDE 2 MG/HR: 2.5 INJECTION, SOLUTION INTRAVENOUS at 08:57

## 2021-09-17 RX ADMIN — INSULIN LISPRO 1 UNITS: 100 INJECTION, SOLUTION INTRAVENOUS; SUBCUTANEOUS at 11:34

## 2021-09-17 RX ADMIN — DOCUSATE SODIUM AND SENNOSIDES 1 TABLET: 8.6; 5 TABLET ORAL at 22:25

## 2021-09-17 RX ADMIN — INSULIN LISPRO 1 UNITS: 100 INJECTION, SOLUTION INTRAVENOUS; SUBCUTANEOUS at 17:45

## 2021-09-17 RX ADMIN — HYDRALAZINE HYDROCHLORIDE 5 MG: 20 INJECTION, SOLUTION INTRAMUSCULAR; INTRAVENOUS at 09:40

## 2021-09-17 RX ADMIN — NICARDIPINE HYDROCHLORIDE 3 MG/HR: 2.5 INJECTION, SOLUTION INTRAVENOUS at 16:13

## 2021-09-17 RX ADMIN — ATORVASTATIN CALCIUM 40 MG: 40 TABLET, FILM COATED ORAL at 16:09

## 2021-09-17 NOTE — TELEPHONE ENCOUNTER
10/04/2021-PT STILL IN HOSPITAL, CT HEAD ORDERED INPT    10/01/2021-PT STILL IN HOSPITAL    09/30/2021-PT STILL IN HOSPITAL    09/29/2021-PT STILL IN HOSPITAL    09/28/2021-PT STILL IN HOSPITAL    09/27/2021-PT STILL IN HOSPITAL    09/23/2021-PT STILL IN HOSPITAL    09/22/2021-PT STILL IN HOSPITAL    09/21/2021-PT STILL IN HOSPITAL    09/20/2021-PT STILL IN HOSPITAL    09/17/2021-PT STILL IN HOSPITAL  10/05/2021 APT W/CT HEAD      ----- Message from Shiva Thompson sent at 9/17/2021 10:48 AM EDT -----  Hi can we please schedule f/u for this gentleman in 2 weeks with PA? I have ordered CT head

## 2021-09-18 LAB
ANION GAP SERPL CALCULATED.3IONS-SCNC: 0 MMOL/L (ref 4–13)
ANION GAP SERPL CALCULATED.3IONS-SCNC: 3 MMOL/L (ref 4–13)
ANION GAP SERPL CALCULATED.3IONS-SCNC: 6 MMOL/L (ref 4–13)
BACTERIA UR QL AUTO: NORMAL /HPF
BILIRUB UR QL STRIP: NEGATIVE
BUN SERPL-MCNC: 19 MG/DL (ref 5–25)
BUN SERPL-MCNC: 19 MG/DL (ref 5–25)
BUN SERPL-MCNC: 21 MG/DL (ref 5–25)
CALCIUM SERPL-MCNC: 8.4 MG/DL (ref 8.3–10.1)
CALCIUM SERPL-MCNC: 8.4 MG/DL (ref 8.3–10.1)
CALCIUM SERPL-MCNC: 8.7 MG/DL (ref 8.3–10.1)
CHLORIDE SERPL-SCNC: 106 MMOL/L (ref 100–108)
CHLORIDE SERPL-SCNC: 116 MMOL/L (ref 100–108)
CHLORIDE SERPL-SCNC: 116 MMOL/L (ref 100–108)
CLARITY UR: CLEAR
CO2 SERPL-SCNC: 24 MMOL/L (ref 21–32)
CO2 SERPL-SCNC: 24 MMOL/L (ref 21–32)
CO2 SERPL-SCNC: 26 MMOL/L (ref 21–32)
COLOR UR: YELLOW
CREAT SERPL-MCNC: 0.69 MG/DL (ref 0.6–1.3)
CREAT SERPL-MCNC: 0.7 MG/DL (ref 0.6–1.3)
CREAT SERPL-MCNC: 0.71 MG/DL (ref 0.6–1.3)
ERYTHROCYTE [DISTWIDTH] IN BLOOD BY AUTOMATED COUNT: 13 % (ref 11.6–15.1)
GFR SERPL CREATININE-BSD FRML MDRD: 87 ML/MIN/1.73SQ M
GFR SERPL CREATININE-BSD FRML MDRD: 87 ML/MIN/1.73SQ M
GFR SERPL CREATININE-BSD FRML MDRD: 88 ML/MIN/1.73SQ M
GLUCOSE SERPL-MCNC: 116 MG/DL (ref 65–140)
GLUCOSE SERPL-MCNC: 137 MG/DL (ref 65–140)
GLUCOSE SERPL-MCNC: 158 MG/DL (ref 65–140)
GLUCOSE SERPL-MCNC: 169 MG/DL (ref 65–140)
GLUCOSE SERPL-MCNC: 180 MG/DL (ref 65–140)
GLUCOSE SERPL-MCNC: 191 MG/DL (ref 65–140)
GLUCOSE UR STRIP-MCNC: NEGATIVE MG/DL
HCT VFR BLD AUTO: 38 % (ref 36.5–49.3)
HGB BLD-MCNC: 12.5 G/DL (ref 12–17)
HGB UR QL STRIP.AUTO: NEGATIVE
HYALINE CASTS #/AREA URNS LPF: NORMAL /LPF
KETONES UR STRIP-MCNC: NEGATIVE MG/DL
LEUKOCYTE ESTERASE UR QL STRIP: NEGATIVE
MAGNESIUM SERPL-MCNC: 2.4 MG/DL (ref 1.6–2.6)
MCH RBC QN AUTO: 31.4 PG (ref 26.8–34.3)
MCHC RBC AUTO-ENTMCNC: 32.9 G/DL (ref 31.4–37.4)
MCV RBC AUTO: 96 FL (ref 82–98)
NITRITE UR QL STRIP: NEGATIVE
NON-SQ EPI CELLS URNS QL MICRO: NORMAL /HPF
OSMOLALITY UR/SERPL-RTO: 295 MMOL/KG (ref 282–298)
OSMOLALITY UR/SERPL-RTO: 306 MMOL/KG (ref 282–298)
OSMOLALITY UR/SERPL-RTO: 308 MMOL/KG (ref 282–298)
PH UR STRIP.AUTO: 6 [PH]
PHOSPHATE SERPL-MCNC: 3 MG/DL (ref 2.3–4.1)
PLATELET # BLD AUTO: 264 THOUSANDS/UL (ref 149–390)
PMV BLD AUTO: 10.2 FL (ref 8.9–12.7)
POTASSIUM SERPL-SCNC: 3.7 MMOL/L (ref 3.5–5.3)
POTASSIUM SERPL-SCNC: 4.1 MMOL/L (ref 3.5–5.3)
POTASSIUM SERPL-SCNC: 4.8 MMOL/L (ref 3.5–5.3)
PROCALCITONIN SERPL-MCNC: 0.06 NG/ML
PROT UR STRIP-MCNC: NEGATIVE MG/DL
RBC # BLD AUTO: 3.98 MILLION/UL (ref 3.88–5.62)
RBC #/AREA URNS AUTO: NORMAL /HPF
SODIUM SERPL-SCNC: 136 MMOL/L (ref 136–145)
SODIUM SERPL-SCNC: 142 MMOL/L (ref 136–145)
SODIUM SERPL-SCNC: 143 MMOL/L (ref 136–145)
SP GR UR STRIP.AUTO: 1.02 (ref 1–1.03)
UROBILINOGEN UR QL STRIP.AUTO: 1 E.U./DL
WBC # BLD AUTO: 11.94 THOUSAND/UL (ref 4.31–10.16)
WBC #/AREA URNS AUTO: NORMAL /HPF

## 2021-09-18 PROCEDURE — 81001 URINALYSIS AUTO W/SCOPE: CPT | Performed by: ANESTHESIOLOGY

## 2021-09-18 PROCEDURE — 83930 ASSAY OF BLOOD OSMOLALITY: CPT | Performed by: ANESTHESIOLOGY

## 2021-09-18 PROCEDURE — 80048 BASIC METABOLIC PNL TOTAL CA: CPT

## 2021-09-18 PROCEDURE — 36569 INSJ PICC 5 YR+ W/O IMAGING: CPT

## 2021-09-18 PROCEDURE — 85027 COMPLETE CBC AUTOMATED: CPT | Performed by: STUDENT IN AN ORGANIZED HEALTH CARE EDUCATION/TRAINING PROGRAM

## 2021-09-18 PROCEDURE — 84100 ASSAY OF PHOSPHORUS: CPT | Performed by: STUDENT IN AN ORGANIZED HEALTH CARE EDUCATION/TRAINING PROGRAM

## 2021-09-18 PROCEDURE — 87040 BLOOD CULTURE FOR BACTERIA: CPT | Performed by: ANESTHESIOLOGY

## 2021-09-18 PROCEDURE — 99232 SBSQ HOSP IP/OBS MODERATE 35: CPT | Performed by: PSYCHIATRY & NEUROLOGY

## 2021-09-18 PROCEDURE — 99233 SBSQ HOSP IP/OBS HIGH 50: CPT | Performed by: ANESTHESIOLOGY

## 2021-09-18 PROCEDURE — 82948 REAGENT STRIP/BLOOD GLUCOSE: CPT

## 2021-09-18 PROCEDURE — 83735 ASSAY OF MAGNESIUM: CPT | Performed by: STUDENT IN AN ORGANIZED HEALTH CARE EDUCATION/TRAINING PROGRAM

## 2021-09-18 PROCEDURE — 84145 PROCALCITONIN (PCT): CPT | Performed by: ANESTHESIOLOGY

## 2021-09-18 PROCEDURE — 02HV33Z INSERTION OF INFUSION DEVICE INTO SUPERIOR VENA CAVA, PERCUTANEOUS APPROACH: ICD-10-PCS | Performed by: INTERNAL MEDICINE

## 2021-09-18 PROCEDURE — C1751 CATH, INF, PER/CENT/MIDLINE: HCPCS

## 2021-09-18 RX ORDER — NICARDIPINE HYDROCHLORIDE 2.5 MG/ML
INJECTION INTRAVENOUS
Status: DISPENSED
Start: 2021-09-18 | End: 2021-09-19

## 2021-09-18 RX ORDER — ACETAMINOPHEN 325 MG/1
650 TABLET ORAL EVERY 6 HOURS PRN
Status: DISCONTINUED | OUTPATIENT
Start: 2021-09-18 | End: 2021-09-18

## 2021-09-18 RX ORDER — MODAFINIL 100 MG/1
100 TABLET ORAL DAILY
Status: DISCONTINUED | OUTPATIENT
Start: 2021-09-18 | End: 2021-09-20

## 2021-09-18 RX ORDER — HYDRALAZINE HYDROCHLORIDE 20 MG/ML
10 INJECTION INTRAMUSCULAR; INTRAVENOUS EVERY 4 HOURS PRN
Status: DISCONTINUED | OUTPATIENT
Start: 2021-09-18 | End: 2021-10-12 | Stop reason: HOSPADM

## 2021-09-18 RX ORDER — HYDRALAZINE HYDROCHLORIDE 20 MG/ML
5 INJECTION INTRAMUSCULAR; INTRAVENOUS EVERY 4 HOURS PRN
Status: DISCONTINUED | OUTPATIENT
Start: 2021-09-18 | End: 2021-09-18

## 2021-09-18 RX ORDER — ACETAMINOPHEN 325 MG/1
975 TABLET ORAL EVERY 8 HOURS SCHEDULED
Status: DISCONTINUED | OUTPATIENT
Start: 2021-09-18 | End: 2021-09-19

## 2021-09-18 RX ORDER — POTASSIUM CHLORIDE 29.8 MG/ML
40 INJECTION INTRAVENOUS ONCE
Status: COMPLETED | OUTPATIENT
Start: 2021-09-18 | End: 2021-09-18

## 2021-09-18 RX ORDER — HYDRALAZINE HYDROCHLORIDE 20 MG/ML
5 INJECTION INTRAMUSCULAR; INTRAVENOUS ONCE
Status: COMPLETED | OUTPATIENT
Start: 2021-09-18 | End: 2021-09-18

## 2021-09-18 RX ORDER — SODIUM CHLORIDE 3 G/100ML
250 INJECTION, SOLUTION INTRAVENOUS ONCE
Status: COMPLETED | OUTPATIENT
Start: 2021-09-18 | End: 2021-09-18

## 2021-09-18 RX ORDER — 3% SODIUM CHLORIDE 3 G/100ML
10 INJECTION, SOLUTION INTRAVENOUS CONTINUOUS
Status: DISCONTINUED | OUTPATIENT
Start: 2021-09-18 | End: 2021-09-19

## 2021-09-18 RX ADMIN — LEVETIRACETAM 500 MG: 100 INJECTION, SOLUTION INTRAVENOUS at 09:16

## 2021-09-18 RX ADMIN — INSULIN LISPRO 1 UNITS: 100 INJECTION, SOLUTION INTRAVENOUS; SUBCUTANEOUS at 06:29

## 2021-09-18 RX ADMIN — NICARDIPINE HYDROCHLORIDE 2.5 MG/HR: 2.5 INJECTION, SOLUTION INTRAVENOUS at 18:40

## 2021-09-18 RX ADMIN — INSULIN LISPRO 1 UNITS: 100 INJECTION, SOLUTION INTRAVENOUS; SUBCUTANEOUS at 18:41

## 2021-09-18 RX ADMIN — ACETAMINOPHEN 975 MG: 325 TABLET, FILM COATED ORAL at 17:09

## 2021-09-18 RX ADMIN — ACETAMINOPHEN 975 MG: 325 TABLET, FILM COATED ORAL at 21:39

## 2021-09-18 RX ADMIN — Medication 2 G: at 11:59

## 2021-09-18 RX ADMIN — HYDRALAZINE HYDROCHLORIDE 5 MG: 20 INJECTION, SOLUTION INTRAMUSCULAR; INTRAVENOUS at 17:11

## 2021-09-18 RX ADMIN — Medication 2 G: at 09:24

## 2021-09-18 RX ADMIN — INSULIN LISPRO 1 UNITS: 100 INJECTION, SOLUTION INTRAVENOUS; SUBCUTANEOUS at 00:58

## 2021-09-18 RX ADMIN — Medication 2 G: at 18:41

## 2021-09-18 RX ADMIN — SODIUM CHLORIDE 30 ML/HR: 3 INJECTION, SOLUTION INTRAVENOUS at 10:40

## 2021-09-18 RX ADMIN — ACETAMINOPHEN 975 MG: 325 TABLET, FILM COATED ORAL at 09:25

## 2021-09-18 RX ADMIN — ATORVASTATIN CALCIUM 40 MG: 40 TABLET, FILM COATED ORAL at 17:09

## 2021-09-18 RX ADMIN — ACETAMINOPHEN 650 MG: 325 TABLET ORAL at 06:26

## 2021-09-18 RX ADMIN — LISINOPRIL 40 MG: 20 TABLET ORAL at 09:25

## 2021-09-18 RX ADMIN — POTASSIUM CHLORIDE 40 MEQ: 29.8 INJECTION, SOLUTION INTRAVENOUS at 10:40

## 2021-09-18 RX ADMIN — HYDRALAZINE HYDROCHLORIDE 5 MG: 20 INJECTION INTRAMUSCULAR; INTRAVENOUS at 00:48

## 2021-09-18 RX ADMIN — LEVETIRACETAM 500 MG: 100 INJECTION, SOLUTION INTRAVENOUS at 20:29

## 2021-09-18 RX ADMIN — DOCUSATE SODIUM AND SENNOSIDES 1 TABLET: 8.6; 5 TABLET ORAL at 21:39

## 2021-09-18 RX ADMIN — SODIUM CHLORIDE 250 ML: 3 INJECTION, SOLUTION INTRAVENOUS at 10:40

## 2021-09-18 RX ADMIN — HYDRALAZINE HYDROCHLORIDE 5 MG: 20 INJECTION, SOLUTION INTRAMUSCULAR; INTRAVENOUS at 06:23

## 2021-09-19 ENCOUNTER — APPOINTMENT (INPATIENT)
Dept: RADIOLOGY | Facility: HOSPITAL | Age: 81
DRG: 064 | End: 2021-09-19
Payer: COMMERCIAL

## 2021-09-19 ENCOUNTER — APPOINTMENT (INPATIENT)
Dept: NEUROLOGY | Facility: CLINIC | Age: 81
DRG: 064 | End: 2021-09-19
Payer: COMMERCIAL

## 2021-09-19 LAB
ANION GAP SERPL CALCULATED.3IONS-SCNC: 1 MMOL/L (ref 4–13)
ANION GAP SERPL CALCULATED.3IONS-SCNC: 2 MMOL/L (ref 4–13)
ANION GAP SERPL CALCULATED.3IONS-SCNC: 2 MMOL/L (ref 4–13)
ANION GAP SERPL CALCULATED.3IONS-SCNC: 5 MMOL/L (ref 4–13)
BASOPHILS # BLD AUTO: 0.07 THOUSANDS/ΜL (ref 0–0.1)
BASOPHILS NFR BLD AUTO: 1 % (ref 0–1)
BUN SERPL-MCNC: 17 MG/DL (ref 5–25)
BUN SERPL-MCNC: 18 MG/DL (ref 5–25)
BUN SERPL-MCNC: 18 MG/DL (ref 5–25)
BUN SERPL-MCNC: 22 MG/DL (ref 5–25)
CALCIUM SERPL-MCNC: 7.9 MG/DL (ref 8.3–10.1)
CALCIUM SERPL-MCNC: 8.4 MG/DL (ref 8.3–10.1)
CALCIUM SERPL-MCNC: 8.5 MG/DL (ref 8.3–10.1)
CALCIUM SERPL-MCNC: 8.7 MG/DL (ref 8.3–10.1)
CHLORIDE SERPL-SCNC: 117 MMOL/L (ref 100–108)
CHLORIDE SERPL-SCNC: 118 MMOL/L (ref 100–108)
CHLORIDE SERPL-SCNC: 120 MMOL/L (ref 100–108)
CHLORIDE SERPL-SCNC: 124 MMOL/L (ref 100–108)
CO2 SERPL-SCNC: 23 MMOL/L (ref 21–32)
CO2 SERPL-SCNC: 25 MMOL/L (ref 21–32)
CO2 SERPL-SCNC: 25 MMOL/L (ref 21–32)
CO2 SERPL-SCNC: 27 MMOL/L (ref 21–32)
CREAT SERPL-MCNC: 0.64 MG/DL (ref 0.6–1.3)
CREAT SERPL-MCNC: 0.65 MG/DL (ref 0.6–1.3)
CREAT SERPL-MCNC: 0.72 MG/DL (ref 0.6–1.3)
CREAT SERPL-MCNC: 0.8 MG/DL (ref 0.6–1.3)
EOSINOPHIL # BLD AUTO: 0.09 THOUSAND/ΜL (ref 0–0.61)
EOSINOPHIL NFR BLD AUTO: 1 % (ref 0–6)
ERYTHROCYTE [DISTWIDTH] IN BLOOD BY AUTOMATED COUNT: 13.6 % (ref 11.6–15.1)
GFR SERPL CREATININE-BSD FRML MDRD: 83 ML/MIN/1.73SQ M
GFR SERPL CREATININE-BSD FRML MDRD: 86 ML/MIN/1.73SQ M
GFR SERPL CREATININE-BSD FRML MDRD: 90 ML/MIN/1.73SQ M
GFR SERPL CREATININE-BSD FRML MDRD: 91 ML/MIN/1.73SQ M
GLUCOSE SERPL-MCNC: 162 MG/DL (ref 65–140)
GLUCOSE SERPL-MCNC: 174 MG/DL (ref 65–140)
GLUCOSE SERPL-MCNC: 177 MG/DL (ref 65–140)
GLUCOSE SERPL-MCNC: 179 MG/DL (ref 65–140)
GLUCOSE SERPL-MCNC: 181 MG/DL (ref 65–140)
GLUCOSE SERPL-MCNC: 197 MG/DL (ref 65–140)
GLUCOSE SERPL-MCNC: 209 MG/DL (ref 65–140)
GLUCOSE SERPL-MCNC: 231 MG/DL (ref 65–140)
HCT VFR BLD AUTO: 37.7 % (ref 36.5–49.3)
HGB BLD-MCNC: 11.9 G/DL (ref 12–17)
IMM GRANULOCYTES # BLD AUTO: 0.13 THOUSAND/UL (ref 0–0.2)
IMM GRANULOCYTES NFR BLD AUTO: 1 % (ref 0–2)
LYMPHOCYTES # BLD AUTO: 1.14 THOUSANDS/ΜL (ref 0.6–4.47)
LYMPHOCYTES NFR BLD AUTO: 9 % (ref 14–44)
MAGNESIUM SERPL-MCNC: 2.6 MG/DL (ref 1.6–2.6)
MCH RBC QN AUTO: 31 PG (ref 26.8–34.3)
MCHC RBC AUTO-ENTMCNC: 31.6 G/DL (ref 31.4–37.4)
MCV RBC AUTO: 98 FL (ref 82–98)
MONOCYTES # BLD AUTO: 0.88 THOUSAND/ΜL (ref 0.17–1.22)
MONOCYTES NFR BLD AUTO: 7 % (ref 4–12)
NEUTROPHILS # BLD AUTO: 9.85 THOUSANDS/ΜL (ref 1.85–7.62)
NEUTS SEG NFR BLD AUTO: 81 % (ref 43–75)
NRBC BLD AUTO-RTO: 0 /100 WBCS
OSMOLALITY UR/SERPL-RTO: 311 MMOL/KG (ref 282–298)
OSMOLALITY UR/SERPL-RTO: 315 MMOL/KG (ref 282–298)
OSMOLALITY UR/SERPL-RTO: 319 MMOL/KG (ref 282–298)
OSMOLALITY UR/SERPL-RTO: 326 MMOL/KG (ref 282–298)
PHOSPHATE SERPL-MCNC: 2.6 MG/DL (ref 2.3–4.1)
PLATELET # BLD AUTO: 253 THOUSANDS/UL (ref 149–390)
PMV BLD AUTO: 10.3 FL (ref 8.9–12.7)
POTASSIUM SERPL-SCNC: 3.7 MMOL/L (ref 3.5–5.3)
POTASSIUM SERPL-SCNC: 3.8 MMOL/L (ref 3.5–5.3)
POTASSIUM SERPL-SCNC: 3.8 MMOL/L (ref 3.5–5.3)
POTASSIUM SERPL-SCNC: 3.9 MMOL/L (ref 3.5–5.3)
PROCALCITONIN SERPL-MCNC: <0.05 NG/ML
RBC # BLD AUTO: 3.84 MILLION/UL (ref 3.88–5.62)
SODIUM SERPL-SCNC: 145 MMOL/L (ref 136–145)
SODIUM SERPL-SCNC: 146 MMOL/L (ref 136–145)
SODIUM SERPL-SCNC: 147 MMOL/L (ref 136–145)
SODIUM SERPL-SCNC: 151 MMOL/L (ref 136–145)
WBC # BLD AUTO: 12.16 THOUSAND/UL (ref 4.31–10.16)

## 2021-09-19 PROCEDURE — 71045 X-RAY EXAM CHEST 1 VIEW: CPT

## 2021-09-19 PROCEDURE — G1004 CDSM NDSC: HCPCS

## 2021-09-19 PROCEDURE — 84100 ASSAY OF PHOSPHORUS: CPT

## 2021-09-19 PROCEDURE — 82948 REAGENT STRIP/BLOOD GLUCOSE: CPT

## 2021-09-19 PROCEDURE — 80048 BASIC METABOLIC PNL TOTAL CA: CPT

## 2021-09-19 PROCEDURE — 83735 ASSAY OF MAGNESIUM: CPT

## 2021-09-19 PROCEDURE — 95715 VEEG EA 12-26HR INTMT MNTR: CPT

## 2021-09-19 PROCEDURE — 95700 EEG CONT REC W/VID EEG TECH: CPT

## 2021-09-19 PROCEDURE — 83930 ASSAY OF BLOOD OSMOLALITY: CPT | Performed by: ANESTHESIOLOGY

## 2021-09-19 PROCEDURE — 99233 SBSQ HOSP IP/OBS HIGH 50: CPT | Performed by: INTERNAL MEDICINE

## 2021-09-19 PROCEDURE — 84145 PROCALCITONIN (PCT): CPT | Performed by: ANESTHESIOLOGY

## 2021-09-19 PROCEDURE — NC001 PR NO CHARGE: Performed by: STUDENT IN AN ORGANIZED HEALTH CARE EDUCATION/TRAINING PROGRAM

## 2021-09-19 PROCEDURE — 70450 CT HEAD/BRAIN W/O DYE: CPT

## 2021-09-19 PROCEDURE — 85025 COMPLETE CBC W/AUTO DIFF WBC: CPT

## 2021-09-19 RX ORDER — AMLODIPINE BESYLATE 5 MG/1
5 TABLET ORAL DAILY
Status: DISCONTINUED | OUTPATIENT
Start: 2021-09-19 | End: 2021-09-21

## 2021-09-19 RX ORDER — LEVETIRACETAM 100 MG/ML
500 SOLUTION ORAL EVERY 12 HOURS SCHEDULED
Status: DISCONTINUED | OUTPATIENT
Start: 2021-09-19 | End: 2021-09-19

## 2021-09-19 RX ORDER — DOXAZOSIN 2 MG/1
2 TABLET ORAL DAILY
Status: DISCONTINUED | OUTPATIENT
Start: 2021-09-19 | End: 2021-10-09

## 2021-09-19 RX ORDER — SODIUM CHLORIDE 9 MG/ML
100 INJECTION, SOLUTION INTRAVENOUS CONTINUOUS
Status: DISCONTINUED | OUTPATIENT
Start: 2021-09-19 | End: 2021-09-19

## 2021-09-19 RX ORDER — LEVETIRACETAM 100 MG/ML
750 SOLUTION ORAL EVERY 12 HOURS SCHEDULED
Status: DISCONTINUED | OUTPATIENT
Start: 2021-09-19 | End: 2021-09-21

## 2021-09-19 RX ORDER — FINASTERIDE 5 MG/1
5 TABLET, FILM COATED ORAL DAILY
Status: DISCONTINUED | OUTPATIENT
Start: 2021-09-19 | End: 2021-10-12 | Stop reason: HOSPADM

## 2021-09-19 RX ORDER — DOXAZOSIN MESYLATE 1 MG/1
1 TABLET ORAL DAILY
Status: DISCONTINUED | OUTPATIENT
Start: 2021-09-19 | End: 2021-09-19

## 2021-09-19 RX ORDER — ACETAMINOPHEN 325 MG/1
975 TABLET ORAL EVERY 8 HOURS PRN
Status: DISCONTINUED | OUTPATIENT
Start: 2021-09-19 | End: 2021-09-22

## 2021-09-19 RX ORDER — NICARDIPINE HYDROCHLORIDE 2.5 MG/ML
INJECTION INTRAVENOUS
Status: DISPENSED
Start: 2021-09-19 | End: 2021-09-20

## 2021-09-19 RX ORDER — POTASSIUM CHLORIDE 20MEQ/15ML
20 LIQUID (ML) ORAL ONCE
Status: COMPLETED | OUTPATIENT
Start: 2021-09-19 | End: 2021-09-19

## 2021-09-19 RX ORDER — MAGNESIUM SULFATE HEPTAHYDRATE 40 MG/ML
2 INJECTION, SOLUTION INTRAVENOUS ONCE
Status: COMPLETED | OUTPATIENT
Start: 2021-09-20 | End: 2021-09-20

## 2021-09-19 RX ORDER — POTASSIUM CHLORIDE 20MEQ/15ML
20 LIQUID (ML) ORAL ONCE
Status: DISCONTINUED | OUTPATIENT
Start: 2021-09-19 | End: 2021-09-19

## 2021-09-19 RX ORDER — LABETALOL 20 MG/4 ML (5 MG/ML) INTRAVENOUS SYRINGE
10 EVERY 6 HOURS PRN
Status: DISCONTINUED | OUTPATIENT
Start: 2021-09-19 | End: 2021-09-20

## 2021-09-19 RX ADMIN — LEVETIRACETAM 500 MG: 100 SOLUTION ORAL at 12:01

## 2021-09-19 RX ADMIN — ALTEPLASE 2 MG: 2.2 INJECTION, POWDER, LYOPHILIZED, FOR SOLUTION INTRAVENOUS at 21:05

## 2021-09-19 RX ADMIN — PIPERACILLIN SODIUM, TAZOBACTAM SODIUM 3.38 G: 36; 4.5 INJECTION, POWDER, LYOPHILIZED, FOR SOLUTION INTRAVENOUS at 23:01

## 2021-09-19 RX ADMIN — POTASSIUM CHLORIDE 20 MEQ: 20 SOLUTION ORAL at 21:05

## 2021-09-19 RX ADMIN — LEVETIRACETAM 1000 MG: 100 INJECTION, SOLUTION INTRAVENOUS at 17:46

## 2021-09-19 RX ADMIN — ATORVASTATIN CALCIUM 40 MG: 40 TABLET, FILM COATED ORAL at 15:58

## 2021-09-19 RX ADMIN — NICARDIPINE HYDROCHLORIDE 10 MG/HR: 2.5 INJECTION, SOLUTION INTRAVENOUS at 09:14

## 2021-09-19 RX ADMIN — Medication 2 G: at 15:57

## 2021-09-19 RX ADMIN — INSULIN LISPRO 1 UNITS: 100 INJECTION, SOLUTION INTRAVENOUS; SUBCUTANEOUS at 00:38

## 2021-09-19 RX ADMIN — Medication 2 G: at 12:01

## 2021-09-19 RX ADMIN — Medication 2 G: at 09:10

## 2021-09-19 RX ADMIN — SODIUM CHLORIDE 100 ML/HR: 0.9 INJECTION, SOLUTION INTRAVENOUS at 17:46

## 2021-09-19 RX ADMIN — DOXAZOSIN 2 MG: 2 TABLET ORAL at 12:01

## 2021-09-19 RX ADMIN — HYDRALAZINE HYDROCHLORIDE 10 MG: 20 INJECTION, SOLUTION INTRAMUSCULAR; INTRAVENOUS at 23:26

## 2021-09-19 RX ADMIN — LABETALOL 20 MG/4 ML (5 MG/ML) INTRAVENOUS SYRINGE 10 MG: at 16:12

## 2021-09-19 RX ADMIN — NICARDIPINE HYDROCHLORIDE 5 MG/HR: 2.5 INJECTION, SOLUTION INTRAVENOUS at 03:33

## 2021-09-19 RX ADMIN — FINASTERIDE 5 MG: 5 TABLET, FILM COATED ORAL at 09:17

## 2021-09-19 RX ADMIN — HYDRALAZINE HYDROCHLORIDE 10 MG: 20 INJECTION, SOLUTION INTRAMUSCULAR; INTRAVENOUS at 02:53

## 2021-09-19 RX ADMIN — ACETAMINOPHEN 975 MG: 325 TABLET, FILM COATED ORAL at 06:30

## 2021-09-19 RX ADMIN — INSULIN LISPRO 1 UNITS: 100 INJECTION, SOLUTION INTRAVENOUS; SUBCUTANEOUS at 13:02

## 2021-09-19 RX ADMIN — MODAFINIL 100 MG: 100 TABLET ORAL at 09:10

## 2021-09-19 RX ADMIN — INSULIN LISPRO 2 UNITS: 100 INJECTION, SOLUTION INTRAVENOUS; SUBCUTANEOUS at 06:09

## 2021-09-19 RX ADMIN — HYDRALAZINE HYDROCHLORIDE 10 MG: 20 INJECTION, SOLUTION INTRAMUSCULAR; INTRAVENOUS at 12:01

## 2021-09-19 RX ADMIN — LISINOPRIL 40 MG: 20 TABLET ORAL at 09:10

## 2021-09-19 RX ADMIN — POTASSIUM & SODIUM PHOSPHATES POWDER PACK 280-160-250 MG 1 PACKET: 280-160-250 PACK at 16:04

## 2021-09-19 RX ADMIN — ACETAMINOPHEN 975 MG: 325 TABLET ORAL at 15:57

## 2021-09-19 RX ADMIN — NICARDIPINE HYDROCHLORIDE 15 MG/HR: 2.5 INJECTION, SOLUTION INTRAVENOUS at 14:10

## 2021-09-19 RX ADMIN — DOCUSATE SODIUM AND SENNOSIDES 1 TABLET: 8.6; 5 TABLET ORAL at 23:00

## 2021-09-19 RX ADMIN — SODIUM CHLORIDE 30 ML/HR: 3 INJECTION, SOLUTION INTRAVENOUS at 02:34

## 2021-09-19 RX ADMIN — AMLODIPINE BESYLATE 5 MG: 5 TABLET ORAL at 09:17

## 2021-09-19 RX ADMIN — PIPERACILLIN SODIUM, TAZOBACTAM SODIUM 3.38 G: 36; 4.5 INJECTION, POWDER, LYOPHILIZED, FOR SOLUTION INTRAVENOUS at 18:44

## 2021-09-19 RX ADMIN — NICARDIPINE HYDROCHLORIDE 7.5 MG/HR: 2.5 INJECTION, SOLUTION INTRAVENOUS at 06:11

## 2021-09-20 ENCOUNTER — APPOINTMENT (INPATIENT)
Dept: RADIOLOGY | Facility: HOSPITAL | Age: 81
DRG: 064 | End: 2021-09-20
Payer: COMMERCIAL

## 2021-09-20 PROBLEM — G92.8 TOXIC METABOLIC ENCEPHALOPATHY: Status: ACTIVE | Noted: 2021-09-20

## 2021-09-20 LAB
ANION GAP SERPL CALCULATED.3IONS-SCNC: 2 MMOL/L (ref 4–13)
ANION GAP SERPL CALCULATED.3IONS-SCNC: 3 MMOL/L (ref 4–13)
ANION GAP SERPL CALCULATED.3IONS-SCNC: 4 MMOL/L (ref 4–13)
BASOPHILS # BLD AUTO: 0.04 THOUSANDS/ΜL (ref 0–0.1)
BASOPHILS NFR BLD AUTO: 0 % (ref 0–1)
BUN SERPL-MCNC: 23 MG/DL (ref 5–25)
BUN SERPL-MCNC: 24 MG/DL (ref 5–25)
BUN SERPL-MCNC: 24 MG/DL (ref 5–25)
CALCIUM SERPL-MCNC: 8.3 MG/DL (ref 8.3–10.1)
CALCIUM SERPL-MCNC: 8.4 MG/DL (ref 8.3–10.1)
CALCIUM SERPL-MCNC: 8.5 MG/DL (ref 8.3–10.1)
CHLORIDE SERPL-SCNC: 121 MMOL/L (ref 100–108)
CHLORIDE SERPL-SCNC: 121 MMOL/L (ref 100–108)
CHLORIDE SERPL-SCNC: 124 MMOL/L (ref 100–108)
CO2 SERPL-SCNC: 25 MMOL/L (ref 21–32)
CO2 SERPL-SCNC: 26 MMOL/L (ref 21–32)
CO2 SERPL-SCNC: 27 MMOL/L (ref 21–32)
CREAT SERPL-MCNC: 0.82 MG/DL (ref 0.6–1.3)
CREAT SERPL-MCNC: 0.83 MG/DL (ref 0.6–1.3)
CREAT SERPL-MCNC: 0.85 MG/DL (ref 0.6–1.3)
EOSINOPHIL # BLD AUTO: 0.02 THOUSAND/ΜL (ref 0–0.61)
EOSINOPHIL NFR BLD AUTO: 0 % (ref 0–6)
ERYTHROCYTE [DISTWIDTH] IN BLOOD BY AUTOMATED COUNT: 14 % (ref 11.6–15.1)
GFR SERPL CREATININE-BSD FRML MDRD: 81 ML/MIN/1.73SQ M
GFR SERPL CREATININE-BSD FRML MDRD: 81 ML/MIN/1.73SQ M
GFR SERPL CREATININE-BSD FRML MDRD: 82 ML/MIN/1.73SQ M
GLUCOSE SERPL-MCNC: 199 MG/DL (ref 65–140)
GLUCOSE SERPL-MCNC: 202 MG/DL (ref 65–140)
GLUCOSE SERPL-MCNC: 209 MG/DL (ref 65–140)
GLUCOSE SERPL-MCNC: 225 MG/DL (ref 65–140)
GLUCOSE SERPL-MCNC: 233 MG/DL (ref 65–140)
GLUCOSE SERPL-MCNC: 246 MG/DL (ref 65–140)
GLUCOSE SERPL-MCNC: 246 MG/DL (ref 65–140)
HCT VFR BLD AUTO: 33.1 % (ref 36.5–49.3)
HGB BLD-MCNC: 10.4 G/DL (ref 12–17)
IMM GRANULOCYTES # BLD AUTO: 0.05 THOUSAND/UL (ref 0–0.2)
IMM GRANULOCYTES NFR BLD AUTO: 1 % (ref 0–2)
LYMPHOCYTES # BLD AUTO: 1.2 THOUSANDS/ΜL (ref 0.6–4.47)
LYMPHOCYTES NFR BLD AUTO: 11 % (ref 14–44)
MAGNESIUM SERPL-MCNC: 3.3 MG/DL (ref 1.6–2.6)
MCH RBC QN AUTO: 31.3 PG (ref 26.8–34.3)
MCHC RBC AUTO-ENTMCNC: 31.4 G/DL (ref 31.4–37.4)
MCV RBC AUTO: 100 FL (ref 82–98)
MONOCYTES # BLD AUTO: 0.91 THOUSAND/ΜL (ref 0.17–1.22)
MONOCYTES NFR BLD AUTO: 9 % (ref 4–12)
NEUTROPHILS # BLD AUTO: 8.45 THOUSANDS/ΜL (ref 1.85–7.62)
NEUTS SEG NFR BLD AUTO: 79 % (ref 43–75)
NRBC BLD AUTO-RTO: 0 /100 WBCS
OSMOLALITY UR/SERPL-RTO: 323 MMOL/KG (ref 282–298)
OSMOLALITY UR/SERPL-RTO: 324 MMOL/KG (ref 282–298)
OSMOLALITY UR/SERPL-RTO: 327 MMOL/KG (ref 282–298)
PHOSPHATE SERPL-MCNC: 3.7 MG/DL (ref 2.3–4.1)
PLATELET # BLD AUTO: 227 THOUSANDS/UL (ref 149–390)
PMV BLD AUTO: 10.1 FL (ref 8.9–12.7)
POTASSIUM SERPL-SCNC: 3.8 MMOL/L (ref 3.5–5.3)
POTASSIUM SERPL-SCNC: 3.8 MMOL/L (ref 3.5–5.3)
POTASSIUM SERPL-SCNC: 3.9 MMOL/L (ref 3.5–5.3)
RBC # BLD AUTO: 3.32 MILLION/UL (ref 3.88–5.62)
SODIUM SERPL-SCNC: 149 MMOL/L (ref 136–145)
SODIUM SERPL-SCNC: 152 MMOL/L (ref 136–145)
SODIUM SERPL-SCNC: 152 MMOL/L (ref 136–145)
WBC # BLD AUTO: 10.67 THOUSAND/UL (ref 4.31–10.16)

## 2021-09-20 PROCEDURE — 97535 SELF CARE MNGMENT TRAINING: CPT

## 2021-09-20 PROCEDURE — 82948 REAGENT STRIP/BLOOD GLUCOSE: CPT

## 2021-09-20 PROCEDURE — 85025 COMPLETE CBC W/AUTO DIFF WBC: CPT | Performed by: FAMILY MEDICINE

## 2021-09-20 PROCEDURE — 97530 THERAPEUTIC ACTIVITIES: CPT

## 2021-09-20 PROCEDURE — 97112 NEUROMUSCULAR REEDUCATION: CPT

## 2021-09-20 PROCEDURE — 99233 SBSQ HOSP IP/OBS HIGH 50: CPT | Performed by: EMERGENCY MEDICINE

## 2021-09-20 PROCEDURE — 80048 BASIC METABOLIC PNL TOTAL CA: CPT

## 2021-09-20 PROCEDURE — NC001 PR NO CHARGE: Performed by: PHYSICIAN ASSISTANT

## 2021-09-20 PROCEDURE — 99232 SBSQ HOSP IP/OBS MODERATE 35: CPT | Performed by: SURGERY

## 2021-09-20 PROCEDURE — 83930 ASSAY OF BLOOD OSMOLALITY: CPT | Performed by: ANESTHESIOLOGY

## 2021-09-20 PROCEDURE — 95720 EEG PHY/QHP EA INCR W/VEEG: CPT | Performed by: PSYCHIATRY & NEUROLOGY

## 2021-09-20 PROCEDURE — 84100 ASSAY OF PHOSPHORUS: CPT | Performed by: FAMILY MEDICINE

## 2021-09-20 PROCEDURE — 83735 ASSAY OF MAGNESIUM: CPT | Performed by: FAMILY MEDICINE

## 2021-09-20 PROCEDURE — 74018 RADEX ABDOMEN 1 VIEW: CPT

## 2021-09-20 PROCEDURE — 99232 SBSQ HOSP IP/OBS MODERATE 35: CPT | Performed by: PSYCHIATRY & NEUROLOGY

## 2021-09-20 RX ORDER — LABETALOL 20 MG/4 ML (5 MG/ML) INTRAVENOUS SYRINGE
20 EVERY 4 HOURS PRN
Status: DISCONTINUED | OUTPATIENT
Start: 2021-09-20 | End: 2021-09-23

## 2021-09-20 RX ORDER — OLANZAPINE 10 MG/1
INJECTION, POWDER, LYOPHILIZED, FOR SOLUTION INTRAMUSCULAR
Status: DISPENSED
Start: 2021-09-20 | End: 2021-09-21

## 2021-09-20 RX ORDER — MODAFINIL 100 MG/1
200 TABLET ORAL DAILY
Status: DISCONTINUED | OUTPATIENT
Start: 2021-09-21 | End: 2021-09-21

## 2021-09-20 RX ADMIN — MAGNESIUM SULFATE HEPTAHYDRATE 2 G: 40 INJECTION, SOLUTION INTRAVENOUS at 00:16

## 2021-09-20 RX ADMIN — AMLODIPINE BESYLATE 5 MG: 5 TABLET ORAL at 08:01

## 2021-09-20 RX ADMIN — PIPERACILLIN SODIUM, TAZOBACTAM SODIUM 3.38 G: 36; 4.5 INJECTION, POWDER, LYOPHILIZED, FOR SOLUTION INTRAVENOUS at 06:23

## 2021-09-20 RX ADMIN — DOCUSATE SODIUM AND SENNOSIDES 1 TABLET: 8.6; 5 TABLET ORAL at 21:00

## 2021-09-20 RX ADMIN — HYDRALAZINE HYDROCHLORIDE 10 MG: 20 INJECTION, SOLUTION INTRAMUSCULAR; INTRAVENOUS at 20:35

## 2021-09-20 RX ADMIN — ATORVASTATIN CALCIUM 40 MG: 40 TABLET, FILM COATED ORAL at 17:27

## 2021-09-20 RX ADMIN — INSULIN LISPRO 2 UNITS: 100 INJECTION, SOLUTION INTRAVENOUS; SUBCUTANEOUS at 06:23

## 2021-09-20 RX ADMIN — PIPERACILLIN SODIUM, TAZOBACTAM SODIUM 3.38 G: 36; 4.5 INJECTION, POWDER, LYOPHILIZED, FOR SOLUTION INTRAVENOUS at 15:00

## 2021-09-20 RX ADMIN — DOXAZOSIN 2 MG: 2 TABLET ORAL at 10:15

## 2021-09-20 RX ADMIN — INSULIN LISPRO 2 UNITS: 100 INJECTION, SOLUTION INTRAVENOUS; SUBCUTANEOUS at 00:01

## 2021-09-20 RX ADMIN — LABETALOL 20 MG/4 ML (5 MG/ML) INTRAVENOUS SYRINGE 20 MG: at 18:09

## 2021-09-20 RX ADMIN — INSULIN LISPRO 1 UNITS: 100 INJECTION, SOLUTION INTRAVENOUS; SUBCUTANEOUS at 17:29

## 2021-09-20 RX ADMIN — INSULIN LISPRO 1 UNITS: 100 INJECTION, SOLUTION INTRAVENOUS; SUBCUTANEOUS at 12:37

## 2021-09-20 RX ADMIN — LISINOPRIL 40 MG: 20 TABLET ORAL at 08:01

## 2021-09-20 RX ADMIN — HYDRALAZINE HYDROCHLORIDE 10 MG: 20 INJECTION, SOLUTION INTRAMUSCULAR; INTRAVENOUS at 11:10

## 2021-09-20 RX ADMIN — ACETAMINOPHEN 975 MG: 325 TABLET ORAL at 00:27

## 2021-09-20 RX ADMIN — LABETALOL 20 MG/4 ML (5 MG/ML) INTRAVENOUS SYRINGE 10 MG: at 08:02

## 2021-09-20 RX ADMIN — FINASTERIDE 5 MG: 5 TABLET, FILM COATED ORAL at 08:01

## 2021-09-20 RX ADMIN — LABETALOL 20 MG/4 ML (5 MG/ML) INTRAVENOUS SYRINGE 20 MG: at 12:33

## 2021-09-20 RX ADMIN — MODAFINIL 100 MG: 100 TABLET ORAL at 08:00

## 2021-09-20 RX ADMIN — Medication 2 G: at 07:58

## 2021-09-20 RX ADMIN — LEVETIRACETAM 750 MG: 100 SOLUTION ORAL at 00:16

## 2021-09-20 RX ADMIN — LEVETIRACETAM 750 MG: 100 SOLUTION ORAL at 08:02

## 2021-09-20 RX ADMIN — PIPERACILLIN SODIUM, TAZOBACTAM SODIUM 3.38 G: 36; 4.5 INJECTION, POWDER, LYOPHILIZED, FOR SOLUTION INTRAVENOUS at 23:57

## 2021-09-20 RX ADMIN — Medication 2 G: at 12:15

## 2021-09-20 RX ADMIN — ACETAMINOPHEN 975 MG: 325 TABLET ORAL at 12:18

## 2021-09-20 RX ADMIN — LEVETIRACETAM 750 MG: 100 SOLUTION ORAL at 21:00

## 2021-09-21 LAB
ANION GAP SERPL CALCULATED.3IONS-SCNC: 3 MMOL/L (ref 4–13)
ATRIAL RATE: 93 BPM
BASOPHILS # BLD AUTO: 0.06 THOUSANDS/ΜL (ref 0–0.1)
BASOPHILS NFR BLD AUTO: 1 % (ref 0–1)
BUN SERPL-MCNC: 28 MG/DL (ref 5–25)
CALCIUM SERPL-MCNC: 8.7 MG/DL (ref 8.3–10.1)
CHLORIDE SERPL-SCNC: 120 MMOL/L (ref 100–108)
CO2 SERPL-SCNC: 27 MMOL/L (ref 21–32)
CREAT SERPL-MCNC: 0.86 MG/DL (ref 0.6–1.3)
EOSINOPHIL # BLD AUTO: 0.05 THOUSAND/ΜL (ref 0–0.61)
EOSINOPHIL NFR BLD AUTO: 0 % (ref 0–6)
ERYTHROCYTE [DISTWIDTH] IN BLOOD BY AUTOMATED COUNT: 14.4 % (ref 11.6–15.1)
GFR SERPL CREATININE-BSD FRML MDRD: 80 ML/MIN/1.73SQ M
GLUCOSE SERPL-MCNC: 182 MG/DL (ref 65–140)
GLUCOSE SERPL-MCNC: 185 MG/DL (ref 65–140)
GLUCOSE SERPL-MCNC: 216 MG/DL (ref 65–140)
GLUCOSE SERPL-MCNC: 222 MG/DL (ref 65–140)
GLUCOSE SERPL-MCNC: 224 MG/DL (ref 65–140)
HCT VFR BLD AUTO: 34.4 % (ref 36.5–49.3)
HGB BLD-MCNC: 10.6 G/DL (ref 12–17)
IMM GRANULOCYTES # BLD AUTO: 0.15 THOUSAND/UL (ref 0–0.2)
IMM GRANULOCYTES NFR BLD AUTO: 1 % (ref 0–2)
LYMPHOCYTES # BLD AUTO: 1.52 THOUSANDS/ΜL (ref 0.6–4.47)
LYMPHOCYTES NFR BLD AUTO: 13 % (ref 14–44)
MCH RBC QN AUTO: 31.1 PG (ref 26.8–34.3)
MCHC RBC AUTO-ENTMCNC: 30.8 G/DL (ref 31.4–37.4)
MCV RBC AUTO: 101 FL (ref 82–98)
MONOCYTES # BLD AUTO: 1.04 THOUSAND/ΜL (ref 0.17–1.22)
MONOCYTES NFR BLD AUTO: 9 % (ref 4–12)
NEUTROPHILS # BLD AUTO: 8.55 THOUSANDS/ΜL (ref 1.85–7.62)
NEUTS SEG NFR BLD AUTO: 76 % (ref 43–75)
NRBC BLD AUTO-RTO: 0 /100 WBCS
OSMOLALITY UR/SERPL-RTO: 334 MMOL/KG (ref 282–298)
P AXIS: 58 DEGREES
PLATELET # BLD AUTO: 252 THOUSANDS/UL (ref 149–390)
PMV BLD AUTO: 10.4 FL (ref 8.9–12.7)
POTASSIUM SERPL-SCNC: 3.8 MMOL/L (ref 3.5–5.3)
PR INTERVAL: 129 MS
QRS AXIS: 25 DEGREES
QRSD INTERVAL: 113 MS
QT INTERVAL: 383 MS
QTC INTERVAL: 477 MS
RBC # BLD AUTO: 3.41 MILLION/UL (ref 3.88–5.62)
SODIUM SERPL-SCNC: 150 MMOL/L (ref 136–145)
T WAVE AXIS: 35 DEGREES
VENTRICULAR RATE: 93 BPM
WBC # BLD AUTO: 11.37 THOUSAND/UL (ref 4.31–10.16)

## 2021-09-21 PROCEDURE — 83930 ASSAY OF BLOOD OSMOLALITY: CPT | Performed by: FAMILY MEDICINE

## 2021-09-21 PROCEDURE — 99232 SBSQ HOSP IP/OBS MODERATE 35: CPT | Performed by: PSYCHIATRY & NEUROLOGY

## 2021-09-21 PROCEDURE — 80048 BASIC METABOLIC PNL TOTAL CA: CPT | Performed by: FAMILY MEDICINE

## 2021-09-21 PROCEDURE — 99233 SBSQ HOSP IP/OBS HIGH 50: CPT | Performed by: EMERGENCY MEDICINE

## 2021-09-21 PROCEDURE — 82948 REAGENT STRIP/BLOOD GLUCOSE: CPT

## 2021-09-21 PROCEDURE — 93005 ELECTROCARDIOGRAM TRACING: CPT

## 2021-09-21 PROCEDURE — 93010 ELECTROCARDIOGRAM REPORT: CPT | Performed by: INTERNAL MEDICINE

## 2021-09-21 PROCEDURE — 85025 COMPLETE CBC W/AUTO DIFF WBC: CPT | Performed by: FAMILY MEDICINE

## 2021-09-21 RX ORDER — AMLODIPINE BESYLATE 10 MG/1
10 TABLET ORAL DAILY
Status: DISCONTINUED | OUTPATIENT
Start: 2021-09-22 | End: 2021-10-09

## 2021-09-21 RX ORDER — POTASSIUM CHLORIDE 20 MEQ/1
20 TABLET, EXTENDED RELEASE ORAL ONCE
Status: COMPLETED | OUTPATIENT
Start: 2021-09-21 | End: 2021-09-21

## 2021-09-21 RX ORDER — HEPARIN SODIUM 5000 [USP'U]/ML
5000 INJECTION, SOLUTION INTRAVENOUS; SUBCUTANEOUS EVERY 8 HOURS SCHEDULED
Status: DISCONTINUED | OUTPATIENT
Start: 2021-09-21 | End: 2021-10-03

## 2021-09-21 RX ADMIN — ATORVASTATIN CALCIUM 40 MG: 40 TABLET, FILM COATED ORAL at 16:03

## 2021-09-21 RX ADMIN — INSULIN LISPRO 2 UNITS: 100 INJECTION, SOLUTION INTRAVENOUS; SUBCUTANEOUS at 03:00

## 2021-09-21 RX ADMIN — PIPERACILLIN SODIUM, TAZOBACTAM SODIUM 3.38 G: 36; 4.5 INJECTION, POWDER, LYOPHILIZED, FOR SOLUTION INTRAVENOUS at 23:18

## 2021-09-21 RX ADMIN — MODAFINIL 200 MG: 100 TABLET ORAL at 09:07

## 2021-09-21 RX ADMIN — INSULIN LISPRO 1 UNITS: 100 INJECTION, SOLUTION INTRAVENOUS; SUBCUTANEOUS at 05:44

## 2021-09-21 RX ADMIN — ACETAMINOPHEN 975 MG: 325 TABLET ORAL at 16:03

## 2021-09-21 RX ADMIN — DOCUSATE SODIUM AND SENNOSIDES 1 TABLET: 8.6; 5 TABLET ORAL at 22:10

## 2021-09-21 RX ADMIN — AMLODIPINE BESYLATE 5 MG: 5 TABLET ORAL at 09:07

## 2021-09-21 RX ADMIN — LISINOPRIL 40 MG: 20 TABLET ORAL at 09:07

## 2021-09-21 RX ADMIN — LEVETIRACETAM 750 MG: 100 SOLUTION ORAL at 09:14

## 2021-09-21 RX ADMIN — INSULIN LISPRO 2 UNITS: 100 INJECTION, SOLUTION INTRAVENOUS; SUBCUTANEOUS at 11:44

## 2021-09-21 RX ADMIN — HEPARIN SODIUM 5000 UNITS: 5000 INJECTION INTRAVENOUS; SUBCUTANEOUS at 14:26

## 2021-09-21 RX ADMIN — PIPERACILLIN SODIUM, TAZOBACTAM SODIUM 3.38 G: 36; 4.5 INJECTION, POWDER, LYOPHILIZED, FOR SOLUTION INTRAVENOUS at 09:15

## 2021-09-21 RX ADMIN — LABETALOL 20 MG/4 ML (5 MG/ML) INTRAVENOUS SYRINGE 20 MG: at 00:03

## 2021-09-21 RX ADMIN — FINASTERIDE 5 MG: 5 TABLET, FILM COATED ORAL at 09:08

## 2021-09-21 RX ADMIN — HYDRALAZINE HYDROCHLORIDE 10 MG: 20 INJECTION, SOLUTION INTRAMUSCULAR; INTRAVENOUS at 03:08

## 2021-09-21 RX ADMIN — PIPERACILLIN SODIUM, TAZOBACTAM SODIUM 3.38 G: 36; 4.5 INJECTION, POWDER, LYOPHILIZED, FOR SOLUTION INTRAVENOUS at 14:29

## 2021-09-21 RX ADMIN — DOXAZOSIN 2 MG: 2 TABLET ORAL at 09:13

## 2021-09-21 RX ADMIN — HEPARIN SODIUM 5000 UNITS: 5000 INJECTION INTRAVENOUS; SUBCUTANEOUS at 22:10

## 2021-09-21 RX ADMIN — POTASSIUM CHLORIDE 20 MEQ: 1500 TABLET, EXTENDED RELEASE ORAL at 09:08

## 2021-09-21 RX ADMIN — INSULIN LISPRO 2 UNITS: 100 INJECTION, SOLUTION INTRAVENOUS; SUBCUTANEOUS at 18:21

## 2021-09-22 ENCOUNTER — APPOINTMENT (INPATIENT)
Dept: RADIOLOGY | Facility: HOSPITAL | Age: 81
DRG: 064 | End: 2021-09-22
Payer: COMMERCIAL

## 2021-09-22 PROBLEM — R73.9 HYPERGLYCEMIA: Status: ACTIVE | Noted: 2021-09-22

## 2021-09-22 PROBLEM — R40.0 DROWSINESS: Status: ACTIVE | Noted: 2021-09-22

## 2021-09-22 PROBLEM — R50.9 FEVER: Status: ACTIVE | Noted: 2021-09-22

## 2021-09-22 PROBLEM — Z71.89 GOALS OF CARE, COUNSELING/DISCUSSION: Status: ACTIVE | Noted: 2021-09-22

## 2021-09-22 LAB
ANION GAP SERPL CALCULATED.3IONS-SCNC: 2 MMOL/L (ref 4–13)
BASOPHILS # BLD AUTO: 0.05 THOUSANDS/ΜL (ref 0–0.1)
BASOPHILS NFR BLD AUTO: 1 % (ref 0–1)
BUN SERPL-MCNC: 31 MG/DL (ref 5–25)
CALCIUM SERPL-MCNC: 8.5 MG/DL (ref 8.3–10.1)
CHLORIDE SERPL-SCNC: 119 MMOL/L (ref 100–108)
CO2 SERPL-SCNC: 28 MMOL/L (ref 21–32)
CREAT SERPL-MCNC: 0.82 MG/DL (ref 0.6–1.3)
EOSINOPHIL # BLD AUTO: 0.14 THOUSAND/ΜL (ref 0–0.61)
EOSINOPHIL NFR BLD AUTO: 1 % (ref 0–6)
ERYTHROCYTE [DISTWIDTH] IN BLOOD BY AUTOMATED COUNT: 14.2 % (ref 11.6–15.1)
GFR SERPL CREATININE-BSD FRML MDRD: 82 ML/MIN/1.73SQ M
GLUCOSE SERPL-MCNC: 177 MG/DL (ref 65–140)
GLUCOSE SERPL-MCNC: 189 MG/DL (ref 65–140)
GLUCOSE SERPL-MCNC: 193 MG/DL (ref 65–140)
GLUCOSE SERPL-MCNC: 205 MG/DL (ref 65–140)
GLUCOSE SERPL-MCNC: 205 MG/DL (ref 65–140)
GLUCOSE SERPL-MCNC: 211 MG/DL (ref 65–140)
GLUCOSE SERPL-MCNC: 225 MG/DL (ref 65–140)
HCT VFR BLD AUTO: 33.7 % (ref 36.5–49.3)
HGB BLD-MCNC: 10.3 G/DL (ref 12–17)
IMM GRANULOCYTES # BLD AUTO: 0.05 THOUSAND/UL (ref 0–0.2)
IMM GRANULOCYTES NFR BLD AUTO: 1 % (ref 0–2)
LYMPHOCYTES # BLD AUTO: 1.61 THOUSANDS/ΜL (ref 0.6–4.47)
LYMPHOCYTES NFR BLD AUTO: 17 % (ref 14–44)
MAGNESIUM SERPL-MCNC: 2.8 MG/DL (ref 1.6–2.6)
MCH RBC QN AUTO: 31.1 PG (ref 26.8–34.3)
MCHC RBC AUTO-ENTMCNC: 30.6 G/DL (ref 31.4–37.4)
MCV RBC AUTO: 102 FL (ref 82–98)
MONOCYTES # BLD AUTO: 0.66 THOUSAND/ΜL (ref 0.17–1.22)
MONOCYTES NFR BLD AUTO: 7 % (ref 4–12)
NEUTROPHILS # BLD AUTO: 7.25 THOUSANDS/ΜL (ref 1.85–7.62)
NEUTS SEG NFR BLD AUTO: 73 % (ref 43–75)
NRBC BLD AUTO-RTO: 0 /100 WBCS
PHOSPHATE SERPL-MCNC: 4.1 MG/DL (ref 2.3–4.1)
PLATELET # BLD AUTO: 240 THOUSANDS/UL (ref 149–390)
PMV BLD AUTO: 10.5 FL (ref 8.9–12.7)
POTASSIUM SERPL-SCNC: 4 MMOL/L (ref 3.5–5.3)
RBC # BLD AUTO: 3.31 MILLION/UL (ref 3.88–5.62)
SODIUM SERPL-SCNC: 149 MMOL/L (ref 136–145)
TSH SERPL DL<=0.05 MIU/L-ACNC: 1.15 UIU/ML (ref 0.36–3.74)
WBC # BLD AUTO: 9.76 THOUSAND/UL (ref 4.31–10.16)

## 2021-09-22 PROCEDURE — G1004 CDSM NDSC: HCPCS

## 2021-09-22 PROCEDURE — NC001 PR NO CHARGE: Performed by: EMERGENCY MEDICINE

## 2021-09-22 PROCEDURE — 82948 REAGENT STRIP/BLOOD GLUCOSE: CPT

## 2021-09-22 PROCEDURE — 84100 ASSAY OF PHOSPHORUS: CPT | Performed by: FAMILY MEDICINE

## 2021-09-22 PROCEDURE — 99232 SBSQ HOSP IP/OBS MODERATE 35: CPT | Performed by: PSYCHIATRY & NEUROLOGY

## 2021-09-22 PROCEDURE — 85025 COMPLETE CBC W/AUTO DIFF WBC: CPT | Performed by: FAMILY MEDICINE

## 2021-09-22 PROCEDURE — 99233 SBSQ HOSP IP/OBS HIGH 50: CPT | Performed by: EMERGENCY MEDICINE

## 2021-09-22 PROCEDURE — 99233 SBSQ HOSP IP/OBS HIGH 50: CPT | Performed by: PHYSICIAN ASSISTANT

## 2021-09-22 PROCEDURE — 83735 ASSAY OF MAGNESIUM: CPT | Performed by: FAMILY MEDICINE

## 2021-09-22 PROCEDURE — 70450 CT HEAD/BRAIN W/O DYE: CPT

## 2021-09-22 PROCEDURE — 80048 BASIC METABOLIC PNL TOTAL CA: CPT | Performed by: FAMILY MEDICINE

## 2021-09-22 PROCEDURE — 84443 ASSAY THYROID STIM HORMONE: CPT | Performed by: FAMILY MEDICINE

## 2021-09-22 RX ORDER — ACETAMINOPHEN 325 MG/1
975 TABLET ORAL EVERY 8 HOURS PRN
Status: DISCONTINUED | OUTPATIENT
Start: 2021-09-22 | End: 2021-09-23

## 2021-09-22 RX ADMIN — HEPARIN SODIUM 5000 UNITS: 5000 INJECTION INTRAVENOUS; SUBCUTANEOUS at 21:49

## 2021-09-22 RX ADMIN — LISINOPRIL 40 MG: 20 TABLET ORAL at 08:26

## 2021-09-22 RX ADMIN — DOCUSATE SODIUM AND SENNOSIDES 1 TABLET: 8.6; 5 TABLET ORAL at 21:49

## 2021-09-22 RX ADMIN — HYDRALAZINE HYDROCHLORIDE 10 MG: 20 INJECTION, SOLUTION INTRAMUSCULAR; INTRAVENOUS at 17:39

## 2021-09-22 RX ADMIN — INSULIN LISPRO 1 UNITS: 100 INJECTION, SOLUTION INTRAVENOUS; SUBCUTANEOUS at 05:07

## 2021-09-22 RX ADMIN — PIPERACILLIN SODIUM, TAZOBACTAM SODIUM 3.38 G: 36; 4.5 INJECTION, POWDER, LYOPHILIZED, FOR SOLUTION INTRAVENOUS at 23:06

## 2021-09-22 RX ADMIN — ATORVASTATIN CALCIUM 40 MG: 40 TABLET, FILM COATED ORAL at 15:57

## 2021-09-22 RX ADMIN — PIPERACILLIN SODIUM, TAZOBACTAM SODIUM 3.38 G: 36; 4.5 INJECTION, POWDER, LYOPHILIZED, FOR SOLUTION INTRAVENOUS at 14:54

## 2021-09-22 RX ADMIN — LABETALOL 20 MG/4 ML (5 MG/ML) INTRAVENOUS SYRINGE 20 MG: at 14:21

## 2021-09-22 RX ADMIN — DOXAZOSIN 2 MG: 2 TABLET ORAL at 08:26

## 2021-09-22 RX ADMIN — INSULIN LISPRO 2 UNITS: 100 INJECTION, SOLUTION INTRAVENOUS; SUBCUTANEOUS at 00:20

## 2021-09-22 RX ADMIN — AMLODIPINE BESYLATE 10 MG: 10 TABLET ORAL at 08:26

## 2021-09-22 RX ADMIN — PIPERACILLIN SODIUM, TAZOBACTAM SODIUM 3.38 G: 36; 4.5 INJECTION, POWDER, LYOPHILIZED, FOR SOLUTION INTRAVENOUS at 08:01

## 2021-09-22 RX ADMIN — FINASTERIDE 5 MG: 5 TABLET, FILM COATED ORAL at 08:26

## 2021-09-22 RX ADMIN — INSULIN LISPRO 1 UNITS: 100 INJECTION, SOLUTION INTRAVENOUS; SUBCUTANEOUS at 17:49

## 2021-09-22 RX ADMIN — HEPARIN SODIUM 5000 UNITS: 5000 INJECTION INTRAVENOUS; SUBCUTANEOUS at 14:09

## 2021-09-22 RX ADMIN — ACETAMINOPHEN 975 MG: 325 TABLET, FILM COATED ORAL at 17:39

## 2021-09-22 RX ADMIN — ACETAMINOPHEN 975 MG: 325 TABLET ORAL at 08:25

## 2021-09-22 RX ADMIN — INSULIN LISPRO 2 UNITS: 100 INJECTION, SOLUTION INTRAVENOUS; SUBCUTANEOUS at 12:00

## 2021-09-22 RX ADMIN — INSULIN LISPRO 2 UNITS: 100 INJECTION, SOLUTION INTRAVENOUS; SUBCUTANEOUS at 23:06

## 2021-09-22 RX ADMIN — HEPARIN SODIUM 5000 UNITS: 5000 INJECTION INTRAVENOUS; SUBCUTANEOUS at 05:07

## 2021-09-23 ENCOUNTER — APPOINTMENT (INPATIENT)
Dept: RADIOLOGY | Facility: HOSPITAL | Age: 81
DRG: 064 | End: 2021-09-23
Payer: COMMERCIAL

## 2021-09-23 LAB
ANION GAP SERPL CALCULATED.3IONS-SCNC: 3 MMOL/L (ref 4–13)
BACTERIA BLD CULT: NORMAL
BACTERIA BLD CULT: NORMAL
BASOPHILS # BLD AUTO: 0.04 THOUSANDS/ΜL (ref 0–0.1)
BASOPHILS NFR BLD AUTO: 0 % (ref 0–1)
BUN SERPL-MCNC: 32 MG/DL (ref 5–25)
CALCIUM SERPL-MCNC: 8.4 MG/DL (ref 8.3–10.1)
CHLORIDE SERPL-SCNC: 118 MMOL/L (ref 100–108)
CO2 SERPL-SCNC: 28 MMOL/L (ref 21–32)
CREAT SERPL-MCNC: 0.97 MG/DL (ref 0.6–1.3)
EOSINOPHIL # BLD AUTO: 0.03 THOUSAND/ΜL (ref 0–0.61)
EOSINOPHIL NFR BLD AUTO: 0 % (ref 0–6)
ERYTHROCYTE [DISTWIDTH] IN BLOOD BY AUTOMATED COUNT: 14.2 % (ref 11.6–15.1)
GFR SERPL CREATININE-BSD FRML MDRD: 72 ML/MIN/1.73SQ M
GLUCOSE SERPL-MCNC: 217 MG/DL (ref 65–140)
GLUCOSE SERPL-MCNC: 222 MG/DL (ref 65–140)
GLUCOSE SERPL-MCNC: 227 MG/DL (ref 65–140)
GLUCOSE SERPL-MCNC: 239 MG/DL (ref 65–140)
HCT VFR BLD AUTO: 34.3 % (ref 36.5–49.3)
HGB BLD-MCNC: 10.5 G/DL (ref 12–17)
IMM GRANULOCYTES # BLD AUTO: 0.07 THOUSAND/UL (ref 0–0.2)
IMM GRANULOCYTES NFR BLD AUTO: 1 % (ref 0–2)
LYMPHOCYTES # BLD AUTO: 1.7 THOUSANDS/ΜL (ref 0.6–4.47)
LYMPHOCYTES NFR BLD AUTO: 18 % (ref 14–44)
MAGNESIUM SERPL-MCNC: 3 MG/DL (ref 1.6–2.6)
MCH RBC QN AUTO: 31.3 PG (ref 26.8–34.3)
MCHC RBC AUTO-ENTMCNC: 30.6 G/DL (ref 31.4–37.4)
MCV RBC AUTO: 102 FL (ref 82–98)
MONOCYTES # BLD AUTO: 0.72 THOUSAND/ΜL (ref 0.17–1.22)
MONOCYTES NFR BLD AUTO: 8 % (ref 4–12)
NEUTROPHILS # BLD AUTO: 6.78 THOUSANDS/ΜL (ref 1.85–7.62)
NEUTS SEG NFR BLD AUTO: 73 % (ref 43–75)
NRBC BLD AUTO-RTO: 0 /100 WBCS
PHOSPHATE SERPL-MCNC: 3.7 MG/DL (ref 2.3–4.1)
PLATELET # BLD AUTO: 247 THOUSANDS/UL (ref 149–390)
PMV BLD AUTO: 10.8 FL (ref 8.9–12.7)
POTASSIUM SERPL-SCNC: 4 MMOL/L (ref 3.5–5.3)
RBC # BLD AUTO: 3.35 MILLION/UL (ref 3.88–5.62)
SODIUM SERPL-SCNC: 149 MMOL/L (ref 136–145)
WBC # BLD AUTO: 9.34 THOUSAND/UL (ref 4.31–10.16)

## 2021-09-23 PROCEDURE — 71045 X-RAY EXAM CHEST 1 VIEW: CPT

## 2021-09-23 PROCEDURE — 84100 ASSAY OF PHOSPHORUS: CPT | Performed by: FAMILY MEDICINE

## 2021-09-23 PROCEDURE — 85025 COMPLETE CBC W/AUTO DIFF WBC: CPT | Performed by: FAMILY MEDICINE

## 2021-09-23 PROCEDURE — 99232 SBSQ HOSP IP/OBS MODERATE 35: CPT | Performed by: PHYSICIAN ASSISTANT

## 2021-09-23 PROCEDURE — 99232 SBSQ HOSP IP/OBS MODERATE 35: CPT | Performed by: PSYCHIATRY & NEUROLOGY

## 2021-09-23 PROCEDURE — 80048 BASIC METABOLIC PNL TOTAL CA: CPT | Performed by: FAMILY MEDICINE

## 2021-09-23 PROCEDURE — 82948 REAGENT STRIP/BLOOD GLUCOSE: CPT

## 2021-09-23 PROCEDURE — 92526 ORAL FUNCTION THERAPY: CPT

## 2021-09-23 PROCEDURE — 99232 SBSQ HOSP IP/OBS MODERATE 35: CPT | Performed by: INTERNAL MEDICINE

## 2021-09-23 PROCEDURE — 83735 ASSAY OF MAGNESIUM: CPT | Performed by: FAMILY MEDICINE

## 2021-09-23 RX ORDER — ACETAMINOPHEN 325 MG/1
650 TABLET ORAL EVERY 8 HOURS PRN
Status: DISCONTINUED | OUTPATIENT
Start: 2021-09-23 | End: 2021-09-30

## 2021-09-23 RX ORDER — ACETAMINOPHEN 160 MG/5ML
500 SUSPENSION, ORAL (FINAL DOSE FORM) ORAL EVERY 6 HOURS
Status: DISCONTINUED | OUTPATIENT
Start: 2021-09-23 | End: 2021-10-03

## 2021-09-23 RX ADMIN — HEPARIN SODIUM 5000 UNITS: 5000 INJECTION INTRAVENOUS; SUBCUTANEOUS at 06:41

## 2021-09-23 RX ADMIN — PIPERACILLIN SODIUM, TAZOBACTAM SODIUM 3.38 G: 36; 4.5 INJECTION, POWDER, LYOPHILIZED, FOR SOLUTION INTRAVENOUS at 15:44

## 2021-09-23 RX ADMIN — INSULIN LISPRO 2 UNITS: 100 INJECTION, SOLUTION INTRAVENOUS; SUBCUTANEOUS at 17:38

## 2021-09-23 RX ADMIN — LISINOPRIL 40 MG: 20 TABLET ORAL at 07:30

## 2021-09-23 RX ADMIN — HEPARIN SODIUM 5000 UNITS: 5000 INJECTION INTRAVENOUS; SUBCUTANEOUS at 13:49

## 2021-09-23 RX ADMIN — HEPARIN SODIUM 5000 UNITS: 5000 INJECTION INTRAVENOUS; SUBCUTANEOUS at 22:14

## 2021-09-23 RX ADMIN — AMLODIPINE BESYLATE 10 MG: 10 TABLET ORAL at 07:32

## 2021-09-23 RX ADMIN — INSULIN LISPRO 2 UNITS: 100 INJECTION, SOLUTION INTRAVENOUS; SUBCUTANEOUS at 12:00

## 2021-09-23 RX ADMIN — PIPERACILLIN SODIUM, TAZOBACTAM SODIUM 3.38 G: 36; 4.5 INJECTION, POWDER, LYOPHILIZED, FOR SOLUTION INTRAVENOUS at 09:08

## 2021-09-23 RX ADMIN — FINASTERIDE 5 MG: 5 TABLET, FILM COATED ORAL at 07:32

## 2021-09-23 RX ADMIN — ATORVASTATIN CALCIUM 40 MG: 40 TABLET, FILM COATED ORAL at 15:44

## 2021-09-23 RX ADMIN — PIPERACILLIN SODIUM, TAZOBACTAM SODIUM 3.38 G: 36; 4.5 INJECTION, POWDER, LYOPHILIZED, FOR SOLUTION INTRAVENOUS at 22:10

## 2021-09-23 RX ADMIN — ACETAMINOPHEN 975 MG: 325 TABLET, FILM COATED ORAL at 01:54

## 2021-09-23 RX ADMIN — ACETAMINOPHEN 975 MG: 325 TABLET, FILM COATED ORAL at 15:44

## 2021-09-23 RX ADMIN — INSULIN LISPRO 2 UNITS: 100 INJECTION, SOLUTION INTRAVENOUS; SUBCUTANEOUS at 07:32

## 2021-09-23 RX ADMIN — DOXAZOSIN 2 MG: 2 TABLET ORAL at 07:31

## 2021-09-24 LAB
ALBUMIN SERPL BCP-MCNC: 2.1 G/DL (ref 3.5–5)
ALP SERPL-CCNC: 81 U/L (ref 46–116)
ALT SERPL W P-5'-P-CCNC: 78 U/L (ref 12–78)
ANION GAP SERPL CALCULATED.3IONS-SCNC: 3 MMOL/L (ref 4–13)
ANION GAP SERPL CALCULATED.3IONS-SCNC: 6 MMOL/L (ref 4–13)
AST SERPL W P-5'-P-CCNC: 67 U/L (ref 5–45)
BASOPHILS # BLD AUTO: 0.06 THOUSANDS/ΜL (ref 0–0.1)
BASOPHILS NFR BLD AUTO: 1 % (ref 0–1)
BILIRUB SERPL-MCNC: 0.48 MG/DL (ref 0.2–1)
BUN SERPL-MCNC: 36 MG/DL (ref 5–25)
BUN SERPL-MCNC: 37 MG/DL (ref 5–25)
CALCIUM ALBUM COR SERPL-MCNC: 9.7 MG/DL (ref 8.3–10.1)
CALCIUM SERPL-MCNC: 8.1 MG/DL (ref 8.3–10.1)
CALCIUM SERPL-MCNC: 8.2 MG/DL (ref 8.3–10.1)
CHLORIDE SERPL-SCNC: 117 MMOL/L (ref 100–108)
CHLORIDE SERPL-SCNC: 118 MMOL/L (ref 100–108)
CO2 SERPL-SCNC: 27 MMOL/L (ref 21–32)
CO2 SERPL-SCNC: 28 MMOL/L (ref 21–32)
CREAT SERPL-MCNC: 0.83 MG/DL (ref 0.6–1.3)
CREAT SERPL-MCNC: 0.89 MG/DL (ref 0.6–1.3)
EOSINOPHIL # BLD AUTO: 0.17 THOUSAND/ΜL (ref 0–0.61)
EOSINOPHIL NFR BLD AUTO: 2 % (ref 0–6)
ERYTHROCYTE [DISTWIDTH] IN BLOOD BY AUTOMATED COUNT: 13.8 % (ref 11.6–15.1)
GFR SERPL CREATININE-BSD FRML MDRD: 79 ML/MIN/1.73SQ M
GFR SERPL CREATININE-BSD FRML MDRD: 81 ML/MIN/1.73SQ M
GLUCOSE SERPL-MCNC: 172 MG/DL (ref 65–140)
GLUCOSE SERPL-MCNC: 192 MG/DL (ref 65–140)
GLUCOSE SERPL-MCNC: 200 MG/DL (ref 65–140)
GLUCOSE SERPL-MCNC: 207 MG/DL (ref 65–140)
GLUCOSE SERPL-MCNC: 209 MG/DL (ref 65–140)
GLUCOSE SERPL-MCNC: 211 MG/DL (ref 65–140)
GLUCOSE SERPL-MCNC: 218 MG/DL (ref 65–140)
HCT VFR BLD AUTO: 33.4 % (ref 36.5–49.3)
HGB BLD-MCNC: 10.2 G/DL (ref 12–17)
IMM GRANULOCYTES # BLD AUTO: 0.09 THOUSAND/UL (ref 0–0.2)
IMM GRANULOCYTES NFR BLD AUTO: 1 % (ref 0–2)
LYMPHOCYTES # BLD AUTO: 2.23 THOUSANDS/ΜL (ref 0.6–4.47)
LYMPHOCYTES NFR BLD AUTO: 20 % (ref 14–44)
MAGNESIUM SERPL-MCNC: 2.9 MG/DL (ref 1.6–2.6)
MAGNESIUM SERPL-MCNC: 3 MG/DL (ref 1.6–2.6)
MCH RBC QN AUTO: 31.2 PG (ref 26.8–34.3)
MCHC RBC AUTO-ENTMCNC: 30.5 G/DL (ref 31.4–37.4)
MCV RBC AUTO: 102 FL (ref 82–98)
MONOCYTES # BLD AUTO: 0.89 THOUSAND/ΜL (ref 0.17–1.22)
MONOCYTES NFR BLD AUTO: 8 % (ref 4–12)
NEUTROPHILS # BLD AUTO: 7.54 THOUSANDS/ΜL (ref 1.85–7.62)
NEUTS SEG NFR BLD AUTO: 68 % (ref 43–75)
NRBC BLD AUTO-RTO: 0 /100 WBCS
PHOSPHATE SERPL-MCNC: 3.3 MG/DL (ref 2.3–4.1)
PHOSPHATE SERPL-MCNC: 3.6 MG/DL (ref 2.3–4.1)
PLATELET # BLD AUTO: 249 THOUSANDS/UL (ref 149–390)
PMV BLD AUTO: 10.9 FL (ref 8.9–12.7)
POTASSIUM SERPL-SCNC: 3.8 MMOL/L (ref 3.5–5.3)
POTASSIUM SERPL-SCNC: 3.8 MMOL/L (ref 3.5–5.3)
PROT SERPL-MCNC: 6.5 G/DL (ref 6.4–8.2)
RBC # BLD AUTO: 3.27 MILLION/UL (ref 3.88–5.62)
SODIUM SERPL-SCNC: 149 MMOL/L (ref 136–145)
SODIUM SERPL-SCNC: 150 MMOL/L (ref 136–145)
WBC # BLD AUTO: 10.98 THOUSAND/UL (ref 4.31–10.16)

## 2021-09-24 PROCEDURE — 84100 ASSAY OF PHOSPHORUS: CPT | Performed by: FAMILY MEDICINE

## 2021-09-24 PROCEDURE — 80053 COMPREHEN METABOLIC PANEL: CPT | Performed by: INTERNAL MEDICINE

## 2021-09-24 PROCEDURE — 85025 COMPLETE CBC W/AUTO DIFF WBC: CPT | Performed by: INTERNAL MEDICINE

## 2021-09-24 PROCEDURE — 99232 SBSQ HOSP IP/OBS MODERATE 35: CPT | Performed by: PSYCHIATRY & NEUROLOGY

## 2021-09-24 PROCEDURE — 80048 BASIC METABOLIC PNL TOTAL CA: CPT | Performed by: FAMILY MEDICINE

## 2021-09-24 PROCEDURE — 99232 SBSQ HOSP IP/OBS MODERATE 35: CPT | Performed by: INTERNAL MEDICINE

## 2021-09-24 PROCEDURE — 84100 ASSAY OF PHOSPHORUS: CPT | Performed by: INTERNAL MEDICINE

## 2021-09-24 PROCEDURE — 83735 ASSAY OF MAGNESIUM: CPT | Performed by: FAMILY MEDICINE

## 2021-09-24 PROCEDURE — 82948 REAGENT STRIP/BLOOD GLUCOSE: CPT

## 2021-09-24 PROCEDURE — 92526 ORAL FUNCTION THERAPY: CPT

## 2021-09-24 PROCEDURE — 83735 ASSAY OF MAGNESIUM: CPT | Performed by: INTERNAL MEDICINE

## 2021-09-24 RX ORDER — ACETAMINOPHEN 650 MG/1
650 SUPPOSITORY RECTAL ONCE
Status: COMPLETED | OUTPATIENT
Start: 2021-09-24 | End: 2021-09-24

## 2021-09-24 RX ADMIN — HEPARIN SODIUM 5000 UNITS: 5000 INJECTION INTRAVENOUS; SUBCUTANEOUS at 05:36

## 2021-09-24 RX ADMIN — PIPERACILLIN SODIUM, TAZOBACTAM SODIUM 3.38 G: 36; 4.5 INJECTION, POWDER, LYOPHILIZED, FOR SOLUTION INTRAVENOUS at 14:54

## 2021-09-24 RX ADMIN — LISINOPRIL 40 MG: 20 TABLET ORAL at 09:02

## 2021-09-24 RX ADMIN — INSULIN LISPRO 2 UNITS: 100 INJECTION, SOLUTION INTRAVENOUS; SUBCUTANEOUS at 00:51

## 2021-09-24 RX ADMIN — INSULIN LISPRO 1 UNITS: 100 INJECTION, SOLUTION INTRAVENOUS; SUBCUTANEOUS at 23:06

## 2021-09-24 RX ADMIN — ACETAMINOPHEN 500 MG: 650 SUSPENSION ORAL at 00:51

## 2021-09-24 RX ADMIN — PIPERACILLIN SODIUM, TAZOBACTAM SODIUM 3.38 G: 36; 4.5 INJECTION, POWDER, LYOPHILIZED, FOR SOLUTION INTRAVENOUS at 23:05

## 2021-09-24 RX ADMIN — DOXAZOSIN 2 MG: 2 TABLET ORAL at 09:01

## 2021-09-24 RX ADMIN — AMLODIPINE BESYLATE 10 MG: 10 TABLET ORAL at 09:01

## 2021-09-24 RX ADMIN — HEPARIN SODIUM 5000 UNITS: 5000 INJECTION INTRAVENOUS; SUBCUTANEOUS at 14:47

## 2021-09-24 RX ADMIN — INSULIN LISPRO 1 UNITS: 100 INJECTION, SOLUTION INTRAVENOUS; SUBCUTANEOUS at 19:50

## 2021-09-24 RX ADMIN — INSULIN LISPRO 2 UNITS: 100 INJECTION, SOLUTION INTRAVENOUS; SUBCUTANEOUS at 05:52

## 2021-09-24 RX ADMIN — HEPARIN SODIUM 5000 UNITS: 5000 INJECTION INTRAVENOUS; SUBCUTANEOUS at 23:05

## 2021-09-24 RX ADMIN — ACETAMINOPHEN 500 MG: 650 SUSPENSION ORAL at 23:05

## 2021-09-24 RX ADMIN — PIPERACILLIN SODIUM, TAZOBACTAM SODIUM 3.38 G: 36; 4.5 INJECTION, POWDER, LYOPHILIZED, FOR SOLUTION INTRAVENOUS at 08:00

## 2021-09-24 RX ADMIN — FINASTERIDE 5 MG: 5 TABLET, FILM COATED ORAL at 09:01

## 2021-09-24 RX ADMIN — ACETAMINOPHEN 650 MG: 650 SUPPOSITORY RECTAL at 15:21

## 2021-09-24 RX ADMIN — ATORVASTATIN CALCIUM 40 MG: 40 TABLET, FILM COATED ORAL at 18:18

## 2021-09-24 RX ADMIN — ACETAMINOPHEN 500 MG: 650 SUSPENSION ORAL at 05:36

## 2021-09-24 RX ADMIN — ACETAMINOPHEN 500 MG: 650 SUSPENSION ORAL at 18:18

## 2021-09-25 ENCOUNTER — APPOINTMENT (INPATIENT)
Dept: NON INVASIVE DIAGNOSTICS | Facility: HOSPITAL | Age: 81
DRG: 064 | End: 2021-09-25
Payer: COMMERCIAL

## 2021-09-25 LAB
ANION GAP SERPL CALCULATED.3IONS-SCNC: 4 MMOL/L (ref 4–13)
BASOPHILS # BLD AUTO: 0.05 THOUSANDS/ΜL (ref 0–0.1)
BASOPHILS NFR BLD AUTO: 0 % (ref 0–1)
BUN SERPL-MCNC: 45 MG/DL (ref 5–25)
CALCIUM SERPL-MCNC: 8.2 MG/DL (ref 8.3–10.1)
CHLORIDE SERPL-SCNC: 119 MMOL/L (ref 100–108)
CO2 SERPL-SCNC: 27 MMOL/L (ref 21–32)
CREAT SERPL-MCNC: 0.89 MG/DL (ref 0.6–1.3)
EOSINOPHIL # BLD AUTO: 0.3 THOUSAND/ΜL (ref 0–0.61)
EOSINOPHIL NFR BLD AUTO: 3 % (ref 0–6)
ERYTHROCYTE [DISTWIDTH] IN BLOOD BY AUTOMATED COUNT: 13.7 % (ref 11.6–15.1)
GFR SERPL CREATININE-BSD FRML MDRD: 79 ML/MIN/1.73SQ M
GLUCOSE SERPL-MCNC: 184 MG/DL (ref 65–140)
GLUCOSE SERPL-MCNC: 196 MG/DL (ref 65–140)
GLUCOSE SERPL-MCNC: 199 MG/DL (ref 65–140)
GLUCOSE SERPL-MCNC: 222 MG/DL (ref 65–140)
GLUCOSE SERPL-MCNC: 236 MG/DL (ref 65–140)
GLUCOSE SERPL-MCNC: 237 MG/DL (ref 65–140)
HCT VFR BLD AUTO: 33.1 % (ref 36.5–49.3)
HGB BLD-MCNC: 10.1 G/DL (ref 12–17)
IMM GRANULOCYTES # BLD AUTO: 0.07 THOUSAND/UL (ref 0–0.2)
IMM GRANULOCYTES NFR BLD AUTO: 1 % (ref 0–2)
LYMPHOCYTES # BLD AUTO: 2.17 THOUSANDS/ΜL (ref 0.6–4.47)
LYMPHOCYTES NFR BLD AUTO: 18 % (ref 14–44)
MCH RBC QN AUTO: 31.5 PG (ref 26.8–34.3)
MCHC RBC AUTO-ENTMCNC: 30.5 G/DL (ref 31.4–37.4)
MCV RBC AUTO: 103 FL (ref 82–98)
MONOCYTES # BLD AUTO: 0.78 THOUSAND/ΜL (ref 0.17–1.22)
MONOCYTES NFR BLD AUTO: 7 % (ref 4–12)
NEUTROPHILS # BLD AUTO: 8.55 THOUSANDS/ΜL (ref 1.85–7.62)
NEUTS SEG NFR BLD AUTO: 71 % (ref 43–75)
NRBC BLD AUTO-RTO: 0 /100 WBCS
PLATELET # BLD AUTO: 279 THOUSANDS/UL (ref 149–390)
PMV BLD AUTO: 11.5 FL (ref 8.9–12.7)
POTASSIUM SERPL-SCNC: 3.7 MMOL/L (ref 3.5–5.3)
RBC # BLD AUTO: 3.21 MILLION/UL (ref 3.88–5.62)
SODIUM SERPL-SCNC: 150 MMOL/L (ref 136–145)
WBC # BLD AUTO: 11.92 THOUSAND/UL (ref 4.31–10.16)

## 2021-09-25 PROCEDURE — 85025 COMPLETE CBC W/AUTO DIFF WBC: CPT | Performed by: INTERNAL MEDICINE

## 2021-09-25 PROCEDURE — 99232 SBSQ HOSP IP/OBS MODERATE 35: CPT | Performed by: INTERNAL MEDICINE

## 2021-09-25 PROCEDURE — 93971 EXTREMITY STUDY: CPT

## 2021-09-25 PROCEDURE — 82948 REAGENT STRIP/BLOOD GLUCOSE: CPT

## 2021-09-25 PROCEDURE — 99232 SBSQ HOSP IP/OBS MODERATE 35: CPT | Performed by: PSYCHIATRY & NEUROLOGY

## 2021-09-25 PROCEDURE — 80048 BASIC METABOLIC PNL TOTAL CA: CPT | Performed by: INTERNAL MEDICINE

## 2021-09-25 RX ORDER — MODAFINIL 100 MG/1
100 TABLET ORAL DAILY
Status: DISCONTINUED | OUTPATIENT
Start: 2021-09-25 | End: 2021-10-07

## 2021-09-25 RX ADMIN — HEPARIN SODIUM 5000 UNITS: 5000 INJECTION INTRAVENOUS; SUBCUTANEOUS at 06:01

## 2021-09-25 RX ADMIN — ACETAMINOPHEN 500 MG: 650 SUSPENSION ORAL at 23:00

## 2021-09-25 RX ADMIN — ATORVASTATIN CALCIUM 40 MG: 40 TABLET, FILM COATED ORAL at 17:25

## 2021-09-25 RX ADMIN — MODAFINIL 100 MG: 100 TABLET ORAL at 11:31

## 2021-09-25 RX ADMIN — HEPARIN SODIUM 5000 UNITS: 5000 INJECTION INTRAVENOUS; SUBCUTANEOUS at 23:00

## 2021-09-25 RX ADMIN — HEPARIN SODIUM 5000 UNITS: 5000 INJECTION INTRAVENOUS; SUBCUTANEOUS at 14:38

## 2021-09-25 RX ADMIN — INSULIN LISPRO 2 UNITS: 100 INJECTION, SOLUTION INTRAVENOUS; SUBCUTANEOUS at 06:01

## 2021-09-25 RX ADMIN — DOCUSATE SODIUM AND SENNOSIDES 1 TABLET: 8.6; 5 TABLET ORAL at 23:00

## 2021-09-25 RX ADMIN — PIPERACILLIN SODIUM, TAZOBACTAM SODIUM 3.38 G: 36; 4.5 INJECTION, POWDER, LYOPHILIZED, FOR SOLUTION INTRAVENOUS at 23:01

## 2021-09-25 RX ADMIN — ACETAMINOPHEN 500 MG: 650 SUSPENSION ORAL at 17:26

## 2021-09-25 RX ADMIN — INSULIN LISPRO 3 UNITS: 100 INJECTION, SOLUTION INTRAVENOUS; SUBCUTANEOUS at 11:43

## 2021-09-25 RX ADMIN — INSULIN LISPRO 2 UNITS: 100 INJECTION, SOLUTION INTRAVENOUS; SUBCUTANEOUS at 18:22

## 2021-09-25 RX ADMIN — ACETAMINOPHEN 500 MG: 650 SUSPENSION ORAL at 06:01

## 2021-09-25 RX ADMIN — PIPERACILLIN SODIUM, TAZOBACTAM SODIUM 3.38 G: 36; 4.5 INJECTION, POWDER, LYOPHILIZED, FOR SOLUTION INTRAVENOUS at 06:01

## 2021-09-25 RX ADMIN — ACETAMINOPHEN 500 MG: 650 SUSPENSION ORAL at 11:31

## 2021-09-25 RX ADMIN — INSULIN LISPRO 1 UNITS: 100 INJECTION, SOLUTION INTRAVENOUS; SUBCUTANEOUS at 23:01

## 2021-09-25 RX ADMIN — PIPERACILLIN SODIUM, TAZOBACTAM SODIUM 3.38 G: 36; 4.5 INJECTION, POWDER, LYOPHILIZED, FOR SOLUTION INTRAVENOUS at 14:40

## 2021-09-25 RX ADMIN — FINASTERIDE 5 MG: 5 TABLET, FILM COATED ORAL at 08:52

## 2021-09-26 LAB
ALBUMIN SERPL BCP-MCNC: 2.1 G/DL (ref 3.5–5)
ALP SERPL-CCNC: 82 U/L (ref 46–116)
ALT SERPL W P-5'-P-CCNC: 85 U/L (ref 12–78)
ANION GAP SERPL CALCULATED.3IONS-SCNC: 2 MMOL/L (ref 4–13)
AST SERPL W P-5'-P-CCNC: 71 U/L (ref 5–45)
BASOPHILS # BLD AUTO: 0.05 THOUSANDS/ΜL (ref 0–0.1)
BASOPHILS NFR BLD AUTO: 0 % (ref 0–1)
BILIRUB SERPL-MCNC: 0.34 MG/DL (ref 0.2–1)
BUN SERPL-MCNC: 30 MG/DL (ref 5–25)
CALCIUM ALBUM COR SERPL-MCNC: 9.7 MG/DL (ref 8.3–10.1)
CALCIUM SERPL-MCNC: 8.2 MG/DL (ref 8.3–10.1)
CHLORIDE SERPL-SCNC: 120 MMOL/L (ref 100–108)
CO2 SERPL-SCNC: 27 MMOL/L (ref 21–32)
CREAT SERPL-MCNC: 0.78 MG/DL (ref 0.6–1.3)
EOSINOPHIL # BLD AUTO: 0.3 THOUSAND/ΜL (ref 0–0.61)
EOSINOPHIL NFR BLD AUTO: 3 % (ref 0–6)
ERYTHROCYTE [DISTWIDTH] IN BLOOD BY AUTOMATED COUNT: 13.6 % (ref 11.6–15.1)
GFR SERPL CREATININE-BSD FRML MDRD: 83 ML/MIN/1.73SQ M
GLUCOSE SERPL-MCNC: 200 MG/DL (ref 65–140)
GLUCOSE SERPL-MCNC: 204 MG/DL (ref 65–140)
GLUCOSE SERPL-MCNC: 205 MG/DL (ref 65–140)
GLUCOSE SERPL-MCNC: 221 MG/DL (ref 65–140)
HCT VFR BLD AUTO: 33.1 % (ref 36.5–49.3)
HGB BLD-MCNC: 10 G/DL (ref 12–17)
IMM GRANULOCYTES # BLD AUTO: 0.1 THOUSAND/UL (ref 0–0.2)
IMM GRANULOCYTES NFR BLD AUTO: 1 % (ref 0–2)
LYMPHOCYTES # BLD AUTO: 2.1 THOUSANDS/ΜL (ref 0.6–4.47)
LYMPHOCYTES NFR BLD AUTO: 18 % (ref 14–44)
MCH RBC QN AUTO: 31.3 PG (ref 26.8–34.3)
MCHC RBC AUTO-ENTMCNC: 30.2 G/DL (ref 31.4–37.4)
MCV RBC AUTO: 103 FL (ref 82–98)
MONOCYTES # BLD AUTO: 0.78 THOUSAND/ΜL (ref 0.17–1.22)
MONOCYTES NFR BLD AUTO: 7 % (ref 4–12)
NEUTROPHILS # BLD AUTO: 8.14 THOUSANDS/ΜL (ref 1.85–7.62)
NEUTS SEG NFR BLD AUTO: 71 % (ref 43–75)
NRBC BLD AUTO-RTO: 0 /100 WBCS
PLATELET # BLD AUTO: 306 THOUSANDS/UL (ref 149–390)
PMV BLD AUTO: 12 FL (ref 8.9–12.7)
POTASSIUM SERPL-SCNC: 3.9 MMOL/L (ref 3.5–5.3)
PROCALCITONIN SERPL-MCNC: 0.14 NG/ML
PROT SERPL-MCNC: 6.7 G/DL (ref 6.4–8.2)
RBC # BLD AUTO: 3.2 MILLION/UL (ref 3.88–5.62)
SODIUM SERPL-SCNC: 149 MMOL/L (ref 136–145)
WBC # BLD AUTO: 11.47 THOUSAND/UL (ref 4.31–10.16)

## 2021-09-26 PROCEDURE — 84145 PROCALCITONIN (PCT): CPT | Performed by: INTERNAL MEDICINE

## 2021-09-26 PROCEDURE — 93971 EXTREMITY STUDY: CPT | Performed by: SURGERY

## 2021-09-26 PROCEDURE — 85025 COMPLETE CBC W/AUTO DIFF WBC: CPT | Performed by: STUDENT IN AN ORGANIZED HEALTH CARE EDUCATION/TRAINING PROGRAM

## 2021-09-26 PROCEDURE — 99232 SBSQ HOSP IP/OBS MODERATE 35: CPT | Performed by: INTERNAL MEDICINE

## 2021-09-26 PROCEDURE — 99222 1ST HOSP IP/OBS MODERATE 55: CPT | Performed by: SURGERY

## 2021-09-26 PROCEDURE — 82948 REAGENT STRIP/BLOOD GLUCOSE: CPT

## 2021-09-26 PROCEDURE — 80053 COMPREHEN METABOLIC PANEL: CPT | Performed by: STUDENT IN AN ORGANIZED HEALTH CARE EDUCATION/TRAINING PROGRAM

## 2021-09-26 RX ORDER — INSULIN GLARGINE 100 [IU]/ML
10 INJECTION, SOLUTION SUBCUTANEOUS EVERY MORNING
Status: DISCONTINUED | OUTPATIENT
Start: 2021-09-26 | End: 2021-09-28

## 2021-09-26 RX ADMIN — LISINOPRIL 40 MG: 20 TABLET ORAL at 09:14

## 2021-09-26 RX ADMIN — ACETAMINOPHEN 500 MG: 650 SUSPENSION ORAL at 17:22

## 2021-09-26 RX ADMIN — AMLODIPINE BESYLATE 10 MG: 10 TABLET ORAL at 09:13

## 2021-09-26 RX ADMIN — HEPARIN SODIUM 5000 UNITS: 5000 INJECTION INTRAVENOUS; SUBCUTANEOUS at 14:40

## 2021-09-26 RX ADMIN — ACETAMINOPHEN 500 MG: 650 SUSPENSION ORAL at 22:25

## 2021-09-26 RX ADMIN — PIPERACILLIN SODIUM, TAZOBACTAM SODIUM 3.38 G: 36; 4.5 INJECTION, POWDER, LYOPHILIZED, FOR SOLUTION INTRAVENOUS at 06:00

## 2021-09-26 RX ADMIN — INSULIN GLARGINE 10 UNITS: 100 INJECTION, SOLUTION SUBCUTANEOUS at 10:43

## 2021-09-26 RX ADMIN — DOXAZOSIN 2 MG: 2 TABLET ORAL at 09:14

## 2021-09-26 RX ADMIN — FINASTERIDE 5 MG: 5 TABLET, FILM COATED ORAL at 09:14

## 2021-09-26 RX ADMIN — INSULIN LISPRO 2 UNITS: 100 INJECTION, SOLUTION INTRAVENOUS; SUBCUTANEOUS at 18:34

## 2021-09-26 RX ADMIN — PIPERACILLIN SODIUM, TAZOBACTAM SODIUM 3.38 G: 36; 4.5 INJECTION, POWDER, LYOPHILIZED, FOR SOLUTION INTRAVENOUS at 15:17

## 2021-09-26 RX ADMIN — ATORVASTATIN CALCIUM 40 MG: 40 TABLET, FILM COATED ORAL at 17:21

## 2021-09-26 RX ADMIN — HEPARIN SODIUM 5000 UNITS: 5000 INJECTION INTRAVENOUS; SUBCUTANEOUS at 05:58

## 2021-09-26 RX ADMIN — MODAFINIL 100 MG: 100 TABLET ORAL at 09:13

## 2021-09-26 RX ADMIN — PIPERACILLIN SODIUM, TAZOBACTAM SODIUM 3.38 G: 36; 4.5 INJECTION, POWDER, LYOPHILIZED, FOR SOLUTION INTRAVENOUS at 22:25

## 2021-09-26 RX ADMIN — HEPARIN SODIUM 5000 UNITS: 5000 INJECTION INTRAVENOUS; SUBCUTANEOUS at 22:25

## 2021-09-26 RX ADMIN — INSULIN LISPRO 2 UNITS: 100 INJECTION, SOLUTION INTRAVENOUS; SUBCUTANEOUS at 12:36

## 2021-09-26 RX ADMIN — ACETAMINOPHEN 500 MG: 650 SUSPENSION ORAL at 12:37

## 2021-09-26 RX ADMIN — INSULIN LISPRO 2 UNITS: 100 INJECTION, SOLUTION INTRAVENOUS; SUBCUTANEOUS at 05:58

## 2021-09-26 RX ADMIN — ACETAMINOPHEN 500 MG: 650 SUSPENSION ORAL at 05:57

## 2021-09-27 LAB
ANION GAP SERPL CALCULATED.3IONS-SCNC: 1 MMOL/L (ref 4–13)
BASOPHILS # BLD AUTO: 0.05 THOUSANDS/ΜL (ref 0–0.1)
BASOPHILS NFR BLD AUTO: 0 % (ref 0–1)
BUN SERPL-MCNC: 24 MG/DL (ref 5–25)
CALCIUM SERPL-MCNC: 8.4 MG/DL (ref 8.3–10.1)
CHLORIDE SERPL-SCNC: 118 MMOL/L (ref 100–108)
CO2 SERPL-SCNC: 25 MMOL/L (ref 21–32)
CREAT SERPL-MCNC: 0.64 MG/DL (ref 0.6–1.3)
EOSINOPHIL # BLD AUTO: 0.3 THOUSAND/ΜL (ref 0–0.61)
EOSINOPHIL NFR BLD AUTO: 2 % (ref 0–6)
ERYTHROCYTE [DISTWIDTH] IN BLOOD BY AUTOMATED COUNT: 13.3 % (ref 11.6–15.1)
GFR SERPL CREATININE-BSD FRML MDRD: 91 ML/MIN/1.73SQ M
GLUCOSE SERPL-MCNC: 205 MG/DL (ref 65–140)
GLUCOSE SERPL-MCNC: 208 MG/DL (ref 65–140)
GLUCOSE SERPL-MCNC: 216 MG/DL (ref 65–140)
GLUCOSE SERPL-MCNC: 226 MG/DL (ref 65–140)
GLUCOSE SERPL-MCNC: 235 MG/DL (ref 65–140)
GLUCOSE SERPL-MCNC: 242 MG/DL (ref 65–140)
HCT VFR BLD AUTO: 33.4 % (ref 36.5–49.3)
HGB BLD-MCNC: 10.2 G/DL (ref 12–17)
IMM GRANULOCYTES # BLD AUTO: 0.2 THOUSAND/UL (ref 0–0.2)
IMM GRANULOCYTES NFR BLD AUTO: 2 % (ref 0–2)
LYMPHOCYTES # BLD AUTO: 2.39 THOUSANDS/ΜL (ref 0.6–4.47)
LYMPHOCYTES NFR BLD AUTO: 19 % (ref 14–44)
MCH RBC QN AUTO: 31.3 PG (ref 26.8–34.3)
MCHC RBC AUTO-ENTMCNC: 30.5 G/DL (ref 31.4–37.4)
MCV RBC AUTO: 103 FL (ref 82–98)
MONOCYTES # BLD AUTO: 0.96 THOUSAND/ΜL (ref 0.17–1.22)
MONOCYTES NFR BLD AUTO: 8 % (ref 4–12)
NEUTROPHILS # BLD AUTO: 8.56 THOUSANDS/ΜL (ref 1.85–7.62)
NEUTS SEG NFR BLD AUTO: 69 % (ref 43–75)
NRBC BLD AUTO-RTO: 0 /100 WBCS
PLATELET # BLD AUTO: 306 THOUSANDS/UL (ref 149–390)
PMV BLD AUTO: 11.6 FL (ref 8.9–12.7)
POTASSIUM SERPL-SCNC: 3.9 MMOL/L (ref 3.5–5.3)
PROCALCITONIN SERPL-MCNC: 0.13 NG/ML
RBC # BLD AUTO: 3.26 MILLION/UL (ref 3.88–5.62)
SODIUM SERPL-SCNC: 144 MMOL/L (ref 136–145)
WBC # BLD AUTO: 12.46 THOUSAND/UL (ref 4.31–10.16)

## 2021-09-27 PROCEDURE — 82948 REAGENT STRIP/BLOOD GLUCOSE: CPT

## 2021-09-27 PROCEDURE — 84145 PROCALCITONIN (PCT): CPT | Performed by: INTERNAL MEDICINE

## 2021-09-27 PROCEDURE — 99232 SBSQ HOSP IP/OBS MODERATE 35: CPT | Performed by: INTERNAL MEDICINE

## 2021-09-27 PROCEDURE — 80048 BASIC METABOLIC PNL TOTAL CA: CPT | Performed by: INTERNAL MEDICINE

## 2021-09-27 PROCEDURE — 85025 COMPLETE CBC W/AUTO DIFF WBC: CPT | Performed by: INTERNAL MEDICINE

## 2021-09-27 RX ADMIN — ATORVASTATIN CALCIUM 40 MG: 40 TABLET, FILM COATED ORAL at 17:22

## 2021-09-27 RX ADMIN — INSULIN LISPRO 2 UNITS: 100 INJECTION, SOLUTION INTRAVENOUS; SUBCUTANEOUS at 01:02

## 2021-09-27 RX ADMIN — INSULIN GLARGINE 10 UNITS: 100 INJECTION, SOLUTION SUBCUTANEOUS at 08:35

## 2021-09-27 RX ADMIN — HEPARIN SODIUM 5000 UNITS: 5000 INJECTION INTRAVENOUS; SUBCUTANEOUS at 22:42

## 2021-09-27 RX ADMIN — ACETAMINOPHEN 500 MG: 650 SUSPENSION ORAL at 22:42

## 2021-09-27 RX ADMIN — INSULIN LISPRO 2 UNITS: 100 INJECTION, SOLUTION INTRAVENOUS; SUBCUTANEOUS at 05:38

## 2021-09-27 RX ADMIN — ACETAMINOPHEN 500 MG: 650 SUSPENSION ORAL at 17:22

## 2021-09-27 RX ADMIN — INSULIN LISPRO 3 UNITS: 100 INJECTION, SOLUTION INTRAVENOUS; SUBCUTANEOUS at 17:36

## 2021-09-27 RX ADMIN — LISINOPRIL 40 MG: 20 TABLET ORAL at 08:35

## 2021-09-27 RX ADMIN — ACETAMINOPHEN 500 MG: 650 SUSPENSION ORAL at 12:22

## 2021-09-27 RX ADMIN — FINASTERIDE 5 MG: 5 TABLET, FILM COATED ORAL at 08:36

## 2021-09-27 RX ADMIN — HEPARIN SODIUM 5000 UNITS: 5000 INJECTION INTRAVENOUS; SUBCUTANEOUS at 13:48

## 2021-09-27 RX ADMIN — HYDRALAZINE HYDROCHLORIDE 10 MG: 20 INJECTION, SOLUTION INTRAMUSCULAR; INTRAVENOUS at 03:52

## 2021-09-27 RX ADMIN — ACETAMINOPHEN 500 MG: 650 SUSPENSION ORAL at 05:37

## 2021-09-27 RX ADMIN — DOXAZOSIN 2 MG: 2 TABLET ORAL at 08:35

## 2021-09-27 RX ADMIN — INSULIN LISPRO 3 UNITS: 100 INJECTION, SOLUTION INTRAVENOUS; SUBCUTANEOUS at 12:23

## 2021-09-27 RX ADMIN — MODAFINIL 100 MG: 100 TABLET ORAL at 08:36

## 2021-09-27 RX ADMIN — AMLODIPINE BESYLATE 10 MG: 10 TABLET ORAL at 08:36

## 2021-09-27 RX ADMIN — HEPARIN SODIUM 5000 UNITS: 5000 INJECTION INTRAVENOUS; SUBCUTANEOUS at 05:37

## 2021-09-28 LAB
GLUCOSE SERPL-MCNC: 179 MG/DL (ref 65–140)
GLUCOSE SERPL-MCNC: 203 MG/DL (ref 65–140)
GLUCOSE SERPL-MCNC: 205 MG/DL (ref 65–140)
GLUCOSE SERPL-MCNC: 208 MG/DL (ref 65–140)
GLUCOSE SERPL-MCNC: 224 MG/DL (ref 65–140)
GLUCOSE SERPL-MCNC: 225 MG/DL (ref 65–140)

## 2021-09-28 PROCEDURE — 99232 SBSQ HOSP IP/OBS MODERATE 35: CPT | Performed by: INTERNAL MEDICINE

## 2021-09-28 PROCEDURE — 97530 THERAPEUTIC ACTIVITIES: CPT

## 2021-09-28 PROCEDURE — 82948 REAGENT STRIP/BLOOD GLUCOSE: CPT

## 2021-09-28 PROCEDURE — 97112 NEUROMUSCULAR REEDUCATION: CPT

## 2021-09-28 PROCEDURE — 97110 THERAPEUTIC EXERCISES: CPT

## 2021-09-28 RX ORDER — INSULIN GLARGINE 100 [IU]/ML
15 INJECTION, SOLUTION SUBCUTANEOUS EVERY MORNING
Status: DISCONTINUED | OUTPATIENT
Start: 2021-09-29 | End: 2021-09-29

## 2021-09-28 RX ADMIN — HEPARIN SODIUM 5000 UNITS: 5000 INJECTION INTRAVENOUS; SUBCUTANEOUS at 22:08

## 2021-09-28 RX ADMIN — HEPARIN SODIUM 5000 UNITS: 5000 INJECTION INTRAVENOUS; SUBCUTANEOUS at 13:57

## 2021-09-28 RX ADMIN — DOXAZOSIN 2 MG: 2 TABLET ORAL at 09:02

## 2021-09-28 RX ADMIN — INSULIN LISPRO 1 UNITS: 100 INJECTION, SOLUTION INTRAVENOUS; SUBCUTANEOUS at 11:17

## 2021-09-28 RX ADMIN — INSULIN LISPRO 2 UNITS: 100 INJECTION, SOLUTION INTRAVENOUS; SUBCUTANEOUS at 00:32

## 2021-09-28 RX ADMIN — INSULIN GLARGINE 10 UNITS: 100 INJECTION, SOLUTION SUBCUTANEOUS at 09:01

## 2021-09-28 RX ADMIN — ATORVASTATIN CALCIUM 40 MG: 40 TABLET, FILM COATED ORAL at 17:29

## 2021-09-28 RX ADMIN — INSULIN LISPRO 2 UNITS: 100 INJECTION, SOLUTION INTRAVENOUS; SUBCUTANEOUS at 17:33

## 2021-09-28 RX ADMIN — AMLODIPINE BESYLATE 10 MG: 10 TABLET ORAL at 09:02

## 2021-09-28 RX ADMIN — LISINOPRIL 40 MG: 20 TABLET ORAL at 09:01

## 2021-09-28 RX ADMIN — MODAFINIL 100 MG: 100 TABLET ORAL at 09:01

## 2021-09-28 RX ADMIN — FINASTERIDE 5 MG: 5 TABLET, FILM COATED ORAL at 09:02

## 2021-09-28 RX ADMIN — INSULIN LISPRO 2 UNITS: 100 INJECTION, SOLUTION INTRAVENOUS; SUBCUTANEOUS at 06:11

## 2021-09-28 RX ADMIN — ACETAMINOPHEN 500 MG: 650 SUSPENSION ORAL at 17:29

## 2021-09-28 RX ADMIN — ACETAMINOPHEN 500 MG: 650 SUSPENSION ORAL at 06:10

## 2021-09-28 RX ADMIN — ACETAMINOPHEN 500 MG: 650 SUSPENSION ORAL at 11:17

## 2021-09-28 RX ADMIN — HEPARIN SODIUM 5000 UNITS: 5000 INJECTION INTRAVENOUS; SUBCUTANEOUS at 06:10

## 2021-09-29 PROBLEM — R13.10 DYSPHAGIA: Status: ACTIVE | Noted: 2021-09-29

## 2021-09-29 LAB
ANION GAP SERPL CALCULATED.3IONS-SCNC: 4 MMOL/L (ref 4–13)
BASOPHILS # BLD AUTO: 0.04 THOUSANDS/ΜL (ref 0–0.1)
BASOPHILS NFR BLD AUTO: 0 % (ref 0–1)
BUN SERPL-MCNC: 25 MG/DL (ref 5–25)
CALCIUM SERPL-MCNC: 6.8 MG/DL (ref 8.3–10.1)
CHLORIDE SERPL-SCNC: 113 MMOL/L (ref 100–108)
CO2 SERPL-SCNC: 25 MMOL/L (ref 21–32)
CREAT SERPL-MCNC: 0.49 MG/DL (ref 0.6–1.3)
EOSINOPHIL # BLD AUTO: 0.25 THOUSAND/ΜL (ref 0–0.61)
EOSINOPHIL NFR BLD AUTO: 2 % (ref 0–6)
ERYTHROCYTE [DISTWIDTH] IN BLOOD BY AUTOMATED COUNT: 13.2 % (ref 11.6–15.1)
FOLATE SERPL-MCNC: 7.7 NG/ML (ref 3.1–17.5)
GFR SERPL CREATININE-BSD FRML MDRD: 101 ML/MIN/1.73SQ M
GLUCOSE SERPL-MCNC: 105 MG/DL (ref 65–140)
GLUCOSE SERPL-MCNC: 105 MG/DL (ref 65–140)
GLUCOSE SERPL-MCNC: 206 MG/DL (ref 65–140)
GLUCOSE SERPL-MCNC: 209 MG/DL (ref 65–140)
GLUCOSE SERPL-MCNC: 257 MG/DL (ref 65–140)
HCT VFR BLD AUTO: 26.7 % (ref 36.5–49.3)
HCT VFR BLD AUTO: 30.7 % (ref 36.5–49.3)
HGB BLD-MCNC: 8.3 G/DL (ref 12–17)
HGB BLD-MCNC: 9.6 G/DL (ref 12–17)
IMM GRANULOCYTES # BLD AUTO: 0.14 THOUSAND/UL (ref 0–0.2)
IMM GRANULOCYTES NFR BLD AUTO: 1 % (ref 0–2)
LYMPHOCYTES # BLD AUTO: 2.14 THOUSANDS/ΜL (ref 0.6–4.47)
LYMPHOCYTES NFR BLD AUTO: 19 % (ref 14–44)
MCH RBC QN AUTO: 31.3 PG (ref 26.8–34.3)
MCHC RBC AUTO-ENTMCNC: 31.1 G/DL (ref 31.4–37.4)
MCV RBC AUTO: 101 FL (ref 82–98)
MONOCYTES # BLD AUTO: 0.69 THOUSAND/ΜL (ref 0.17–1.22)
MONOCYTES NFR BLD AUTO: 6 % (ref 4–12)
NEUTROPHILS # BLD AUTO: 8.01 THOUSANDS/ΜL (ref 1.85–7.62)
NEUTS SEG NFR BLD AUTO: 72 % (ref 43–75)
NRBC BLD AUTO-RTO: 0 /100 WBCS
PLATELET # BLD AUTO: 297 THOUSANDS/UL (ref 149–390)
PMV BLD AUTO: 11.4 FL (ref 8.9–12.7)
POTASSIUM SERPL-SCNC: 4.4 MMOL/L (ref 3.5–5.3)
RBC # BLD AUTO: 2.65 MILLION/UL (ref 3.88–5.62)
SODIUM SERPL-SCNC: 142 MMOL/L (ref 136–145)
VIT B12 SERPL-MCNC: 1181 PG/ML (ref 100–900)
WBC # BLD AUTO: 11.27 THOUSAND/UL (ref 4.31–10.16)

## 2021-09-29 PROCEDURE — 99232 SBSQ HOSP IP/OBS MODERATE 35: CPT | Performed by: INTERNAL MEDICINE

## 2021-09-29 PROCEDURE — 82607 VITAMIN B-12: CPT | Performed by: STUDENT IN AN ORGANIZED HEALTH CARE EDUCATION/TRAINING PROGRAM

## 2021-09-29 PROCEDURE — 85014 HEMATOCRIT: CPT | Performed by: STUDENT IN AN ORGANIZED HEALTH CARE EDUCATION/TRAINING PROGRAM

## 2021-09-29 PROCEDURE — 82948 REAGENT STRIP/BLOOD GLUCOSE: CPT

## 2021-09-29 PROCEDURE — 85018 HEMOGLOBIN: CPT | Performed by: STUDENT IN AN ORGANIZED HEALTH CARE EDUCATION/TRAINING PROGRAM

## 2021-09-29 PROCEDURE — 80048 BASIC METABOLIC PNL TOTAL CA: CPT | Performed by: STUDENT IN AN ORGANIZED HEALTH CARE EDUCATION/TRAINING PROGRAM

## 2021-09-29 PROCEDURE — 82746 ASSAY OF FOLIC ACID SERUM: CPT | Performed by: STUDENT IN AN ORGANIZED HEALTH CARE EDUCATION/TRAINING PROGRAM

## 2021-09-29 PROCEDURE — 85025 COMPLETE CBC W/AUTO DIFF WBC: CPT | Performed by: STUDENT IN AN ORGANIZED HEALTH CARE EDUCATION/TRAINING PROGRAM

## 2021-09-29 RX ORDER — DEXTROSE AND SODIUM CHLORIDE 5; .9 G/100ML; G/100ML
50 INJECTION, SOLUTION INTRAVENOUS CONTINUOUS
Status: DISCONTINUED | OUTPATIENT
Start: 2021-09-29 | End: 2021-10-01

## 2021-09-29 RX ORDER — SODIUM CHLORIDE 9 MG/ML
50 INJECTION, SOLUTION INTRAVENOUS EVERY 6 HOURS
Status: DISCONTINUED | OUTPATIENT
Start: 2021-09-29 | End: 2021-09-29

## 2021-09-29 RX ADMIN — MODAFINIL 100 MG: 100 TABLET ORAL at 09:09

## 2021-09-29 RX ADMIN — ACETAMINOPHEN 500 MG: 650 SUSPENSION ORAL at 17:38

## 2021-09-29 RX ADMIN — SODIUM CHLORIDE 50 ML/HR: 0.9 INJECTION, SOLUTION INTRAVENOUS at 10:37

## 2021-09-29 RX ADMIN — DOXAZOSIN 2 MG: 2 TABLET ORAL at 09:08

## 2021-09-29 RX ADMIN — ACETAMINOPHEN 500 MG: 650 SUSPENSION ORAL at 00:49

## 2021-09-29 RX ADMIN — DOCUSATE SODIUM AND SENNOSIDES 1 TABLET: 8.6; 5 TABLET ORAL at 21:53

## 2021-09-29 RX ADMIN — ACETAMINOPHEN 500 MG: 650 SUSPENSION ORAL at 11:51

## 2021-09-29 RX ADMIN — DEXTROSE AND SODIUM CHLORIDE 50 ML/HR: 5; .9 INJECTION, SOLUTION INTRAVENOUS at 15:25

## 2021-09-29 RX ADMIN — FINASTERIDE 5 MG: 5 TABLET, FILM COATED ORAL at 09:07

## 2021-09-29 RX ADMIN — ATORVASTATIN CALCIUM 40 MG: 40 TABLET, FILM COATED ORAL at 17:38

## 2021-09-29 RX ADMIN — HEPARIN SODIUM 5000 UNITS: 5000 INJECTION INTRAVENOUS; SUBCUTANEOUS at 05:09

## 2021-09-29 RX ADMIN — AMLODIPINE BESYLATE 10 MG: 10 TABLET ORAL at 09:07

## 2021-09-29 RX ADMIN — ACETAMINOPHEN 500 MG: 650 SUSPENSION ORAL at 06:00

## 2021-09-29 RX ADMIN — INSULIN GLARGINE 15 UNITS: 100 INJECTION, SOLUTION SUBCUTANEOUS at 09:08

## 2021-09-29 RX ADMIN — INSULIN LISPRO 2 UNITS: 100 INJECTION, SOLUTION INTRAVENOUS; SUBCUTANEOUS at 00:49

## 2021-09-29 RX ADMIN — INSULIN LISPRO 3 UNITS: 100 INJECTION, SOLUTION INTRAVENOUS; SUBCUTANEOUS at 07:15

## 2021-09-29 RX ADMIN — HEPARIN SODIUM 5000 UNITS: 5000 INJECTION INTRAVENOUS; SUBCUTANEOUS at 21:52

## 2021-09-29 RX ADMIN — LISINOPRIL 40 MG: 20 TABLET ORAL at 09:08

## 2021-09-29 RX ADMIN — HEPARIN SODIUM 5000 UNITS: 5000 INJECTION INTRAVENOUS; SUBCUTANEOUS at 14:15

## 2021-09-30 ENCOUNTER — ANESTHESIA (INPATIENT)
Dept: PERIOP | Facility: HOSPITAL | Age: 81
DRG: 064 | End: 2021-09-30
Payer: COMMERCIAL

## 2021-09-30 ENCOUNTER — ANESTHESIA EVENT (INPATIENT)
Dept: PERIOP | Facility: HOSPITAL | Age: 81
DRG: 064 | End: 2021-09-30
Payer: COMMERCIAL

## 2021-09-30 ENCOUNTER — APPOINTMENT (INPATIENT)
Dept: RADIOLOGY | Facility: HOSPITAL | Age: 81
DRG: 064 | End: 2021-09-30
Payer: COMMERCIAL

## 2021-09-30 PROBLEM — R79.89 ELEVATED LFTS: Status: ACTIVE | Noted: 2021-09-30

## 2021-09-30 LAB
ALBUMIN SERPL BCP-MCNC: 2.1 G/DL (ref 3.5–5)
ALP SERPL-CCNC: 97 U/L (ref 46–116)
ALT SERPL W P-5'-P-CCNC: 144 U/L (ref 12–78)
ANION GAP SERPL CALCULATED.3IONS-SCNC: 1 MMOL/L (ref 4–13)
ANION GAP SERPL CALCULATED.3IONS-SCNC: 5 MMOL/L (ref 4–13)
AST SERPL W P-5'-P-CCNC: 73 U/L (ref 5–45)
BASOPHILS # BLD AUTO: 0.05 THOUSANDS/ΜL (ref 0–0.1)
BASOPHILS NFR BLD AUTO: 1 % (ref 0–1)
BILIRUB SERPL-MCNC: 0.51 MG/DL (ref 0.2–1)
BUN SERPL-MCNC: 18 MG/DL (ref 5–25)
BUN SERPL-MCNC: 19 MG/DL (ref 5–25)
CALCIUM ALBUM COR SERPL-MCNC: 10.6 MG/DL (ref 8.3–10.1)
CALCIUM SERPL-MCNC: 8.5 MG/DL (ref 8.3–10.1)
CALCIUM SERPL-MCNC: 9.1 MG/DL (ref 8.3–10.1)
CHLORIDE SERPL-SCNC: 107 MMOL/L (ref 100–108)
CHLORIDE SERPL-SCNC: 110 MMOL/L (ref 100–108)
CO2 SERPL-SCNC: 24 MMOL/L (ref 21–32)
CO2 SERPL-SCNC: 26 MMOL/L (ref 21–32)
CREAT SERPL-MCNC: 0.5 MG/DL (ref 0.6–1.3)
CREAT SERPL-MCNC: 0.52 MG/DL (ref 0.6–1.3)
EOSINOPHIL # BLD AUTO: 0.24 THOUSAND/ΜL (ref 0–0.61)
EOSINOPHIL NFR BLD AUTO: 2 % (ref 0–6)
ERYTHROCYTE [DISTWIDTH] IN BLOOD BY AUTOMATED COUNT: 13.4 % (ref 11.6–15.1)
GFR SERPL CREATININE-BSD FRML MDRD: 100 ML/MIN/1.73SQ M
GFR SERPL CREATININE-BSD FRML MDRD: 99 ML/MIN/1.73SQ M
GLUCOSE SERPL-MCNC: 107 MG/DL (ref 65–140)
GLUCOSE SERPL-MCNC: 113 MG/DL (ref 65–140)
GLUCOSE SERPL-MCNC: 128 MG/DL (ref 65–140)
GLUCOSE SERPL-MCNC: 129 MG/DL (ref 65–140)
GLUCOSE SERPL-MCNC: 130 MG/DL (ref 65–140)
GLUCOSE SERPL-MCNC: 98 MG/DL (ref 65–140)
HCT VFR BLD AUTO: 30.5 % (ref 36.5–49.3)
HGB BLD-MCNC: 9.7 G/DL (ref 12–17)
IMM GRANULOCYTES # BLD AUTO: 0.12 THOUSAND/UL (ref 0–0.2)
IMM GRANULOCYTES NFR BLD AUTO: 1 % (ref 0–2)
LYMPHOCYTES # BLD AUTO: 1.74 THOUSANDS/ΜL (ref 0.6–4.47)
LYMPHOCYTES NFR BLD AUTO: 16 % (ref 14–44)
MAGNESIUM SERPL-MCNC: 2.4 MG/DL (ref 1.6–2.6)
MCH RBC QN AUTO: 31.6 PG (ref 26.8–34.3)
MCHC RBC AUTO-ENTMCNC: 31.8 G/DL (ref 31.4–37.4)
MCV RBC AUTO: 99 FL (ref 82–98)
MONOCYTES # BLD AUTO: 0.79 THOUSAND/ΜL (ref 0.17–1.22)
MONOCYTES NFR BLD AUTO: 7 % (ref 4–12)
NEUTROPHILS # BLD AUTO: 8.04 THOUSANDS/ΜL (ref 1.85–7.62)
NEUTS SEG NFR BLD AUTO: 73 % (ref 43–75)
NRBC BLD AUTO-RTO: 0 /100 WBCS
PHOSPHATE SERPL-MCNC: 3.8 MG/DL (ref 2.3–4.1)
PLATELET # BLD AUTO: 354 THOUSANDS/UL (ref 149–390)
PMV BLD AUTO: 11.1 FL (ref 8.9–12.7)
POTASSIUM SERPL-SCNC: 4.3 MMOL/L (ref 3.5–5.3)
POTASSIUM SERPL-SCNC: 4.3 MMOL/L (ref 3.5–5.3)
PROT SERPL-MCNC: 6.6 G/DL (ref 6.4–8.2)
RBC # BLD AUTO: 3.07 MILLION/UL (ref 3.88–5.62)
SODIUM SERPL-SCNC: 136 MMOL/L (ref 136–145)
SODIUM SERPL-SCNC: 137 MMOL/L (ref 136–145)
WBC # BLD AUTO: 10.98 THOUSAND/UL (ref 4.31–10.16)

## 2021-09-30 PROCEDURE — 99232 SBSQ HOSP IP/OBS MODERATE 35: CPT | Performed by: SURGERY

## 2021-09-30 PROCEDURE — 97530 THERAPEUTIC ACTIVITIES: CPT

## 2021-09-30 PROCEDURE — 82948 REAGENT STRIP/BLOOD GLUCOSE: CPT

## 2021-09-30 PROCEDURE — 84100 ASSAY OF PHOSPHORUS: CPT | Performed by: SURGERY

## 2021-09-30 PROCEDURE — 97535 SELF CARE MNGMENT TRAINING: CPT

## 2021-09-30 PROCEDURE — 0DH63UZ INSERTION OF FEEDING DEVICE INTO STOMACH, PERCUTANEOUS APPROACH: ICD-10-PCS | Performed by: SURGERY

## 2021-09-30 PROCEDURE — 43246 EGD PLACE GASTROSTOMY TUBE: CPT | Performed by: SURGERY

## 2021-09-30 PROCEDURE — 80048 BASIC METABOLIC PNL TOTAL CA: CPT | Performed by: SURGERY

## 2021-09-30 PROCEDURE — 80053 COMPREHEN METABOLIC PANEL: CPT | Performed by: STUDENT IN AN ORGANIZED HEALTH CARE EDUCATION/TRAINING PROGRAM

## 2021-09-30 PROCEDURE — 85025 COMPLETE CBC W/AUTO DIFF WBC: CPT | Performed by: SURGERY

## 2021-09-30 PROCEDURE — 83735 ASSAY OF MAGNESIUM: CPT | Performed by: SURGERY

## 2021-09-30 RX ORDER — FENTANYL CITRATE/PF 50 MCG/ML
25 SYRINGE (ML) INJECTION
Status: DISCONTINUED | OUTPATIENT
Start: 2021-09-30 | End: 2021-09-30 | Stop reason: HOSPADM

## 2021-09-30 RX ORDER — ROCURONIUM BROMIDE 10 MG/ML
INJECTION, SOLUTION INTRAVENOUS AS NEEDED
Status: DISCONTINUED | OUTPATIENT
Start: 2021-09-30 | End: 2021-09-30

## 2021-09-30 RX ORDER — SODIUM CHLORIDE, SODIUM LACTATE, POTASSIUM CHLORIDE, CALCIUM CHLORIDE 600; 310; 30; 20 MG/100ML; MG/100ML; MG/100ML; MG/100ML
INJECTION, SOLUTION INTRAVENOUS CONTINUOUS PRN
Status: DISCONTINUED | OUTPATIENT
Start: 2021-09-30 | End: 2021-09-30

## 2021-09-30 RX ORDER — ONDANSETRON 2 MG/ML
4 INJECTION INTRAMUSCULAR; INTRAVENOUS EVERY 4 HOURS PRN
Status: DISCONTINUED | OUTPATIENT
Start: 2021-09-30 | End: 2021-09-30 | Stop reason: HOSPADM

## 2021-09-30 RX ORDER — PROPOFOL 10 MG/ML
INJECTION, EMULSION INTRAVENOUS AS NEEDED
Status: DISCONTINUED | OUTPATIENT
Start: 2021-09-30 | End: 2021-09-30

## 2021-09-30 RX ORDER — EPHEDRINE SULFATE 50 MG/ML
INJECTION INTRAVENOUS AS NEEDED
Status: DISCONTINUED | OUTPATIENT
Start: 2021-09-30 | End: 2021-09-30

## 2021-09-30 RX ORDER — LIDOCAINE HYDROCHLORIDE 10 MG/ML
INJECTION, SOLUTION EPIDURAL; INFILTRATION; INTRACAUDAL; PERINEURAL AS NEEDED
Status: DISCONTINUED | OUTPATIENT
Start: 2021-09-30 | End: 2021-09-30 | Stop reason: HOSPADM

## 2021-09-30 RX ORDER — ACETAMINOPHEN 650 MG/1
650 SUPPOSITORY RECTAL EVERY 4 HOURS PRN
Status: DISCONTINUED | OUTPATIENT
Start: 2021-09-30 | End: 2021-10-09

## 2021-09-30 RX ORDER — FENTANYL CITRATE 50 UG/ML
INJECTION, SOLUTION INTRAMUSCULAR; INTRAVENOUS AS NEEDED
Status: DISCONTINUED | OUTPATIENT
Start: 2021-09-30 | End: 2021-09-30

## 2021-09-30 RX ADMIN — EPHEDRINE SULFATE 5 MG: 50 INJECTION, SOLUTION INTRAVENOUS at 17:20

## 2021-09-30 RX ADMIN — FENTANYL CITRATE 50 MCG: 50 INJECTION INTRAMUSCULAR; INTRAVENOUS at 17:01

## 2021-09-30 RX ADMIN — PROPOFOL 70 MG: 10 INJECTION, EMULSION INTRAVENOUS at 17:11

## 2021-09-30 RX ADMIN — AMLODIPINE BESYLATE 10 MG: 10 TABLET ORAL at 06:45

## 2021-09-30 RX ADMIN — ACETAMINOPHEN 650 MG: 325 TABLET, FILM COATED ORAL at 06:45

## 2021-09-30 RX ADMIN — ACETAMINOPHEN 650 MG: 325 TABLET, FILM COATED ORAL at 06:38

## 2021-09-30 RX ADMIN — HEPARIN SODIUM 5000 UNITS: 5000 INJECTION INTRAVENOUS; SUBCUTANEOUS at 06:31

## 2021-09-30 RX ADMIN — ACETAMINOPHEN 650 MG: 650 SUPPOSITORY RECTAL at 22:02

## 2021-09-30 RX ADMIN — LISINOPRIL 40 MG: 20 TABLET ORAL at 06:38

## 2021-09-30 RX ADMIN — FENTANYL CITRATE 50 MCG: 50 INJECTION INTRAMUSCULAR; INTRAVENOUS at 17:11

## 2021-09-30 RX ADMIN — HEPARIN SODIUM 5000 UNITS: 5000 INJECTION INTRAVENOUS; SUBCUTANEOUS at 22:02

## 2021-09-30 RX ADMIN — FINASTERIDE 5 MG: 5 TABLET, FILM COATED ORAL at 06:45

## 2021-09-30 RX ADMIN — DOXAZOSIN 2 MG: 2 TABLET ORAL at 06:45

## 2021-09-30 RX ADMIN — EPHEDRINE SULFATE 10 MG: 50 INJECTION, SOLUTION INTRAVENOUS at 17:11

## 2021-09-30 RX ADMIN — ACETAMINOPHEN 500 MG: 650 SUSPENSION ORAL at 12:44

## 2021-09-30 RX ADMIN — ROCURONIUM BROMIDE 80 MG: 50 INJECTION, SOLUTION INTRAVENOUS at 17:11

## 2021-09-30 RX ADMIN — ONDANSETRON 4 MG: 2 INJECTION INTRAMUSCULAR; INTRAVENOUS at 17:25

## 2021-09-30 RX ADMIN — MODAFINIL 100 MG: 100 TABLET ORAL at 06:51

## 2021-09-30 RX ADMIN — SUGAMMADEX 200 MG: 100 INJECTION, SOLUTION INTRAVENOUS at 17:25

## 2021-09-30 RX ADMIN — EPHEDRINE SULFATE 10 MG: 50 INJECTION, SOLUTION INTRAVENOUS at 17:16

## 2021-09-30 RX ADMIN — SODIUM CHLORIDE, SODIUM LACTATE, POTASSIUM CHLORIDE, AND CALCIUM CHLORIDE: .6; .31; .03; .02 INJECTION, SOLUTION INTRAVENOUS at 17:03

## 2021-09-30 NOTE — ANESTHESIA PREPROCEDURE EVALUATION
Procedure:  INSERTION PEG TUBE (N/A Abdomen)    Relevant Problems   CARDIO   (+) Hypertension      GI/HEPATIC   (+) Dysphagia      /RENAL   (+) Prostate cancer (HCC)      NEURO/PSYCH   (+) Cerebral hemorrhage (HCC)      Other   (+) Cerebral amyloid angiopathy (HCC)   (+) Toxic metabolic encephalopathy      CXR 9/23/2021:  Trace bilateral pleural effusions  Bilateral lower lobe infiltrates, right side greater than left, representing atelectasis or pneumonia  CT Head 9/22/2021:  1  Multi compartmental intracranial hemorrhage redemonstrated  No new hemorrhage  2   Moderate, chronic microangiopathy  3   Local mass effect and surrounding edema associated with the right parietal parenchymal hematoma is grossly stable  EKG 9/21/2021:  Normal sinus rhythm  Right bundle branch block  Abnormal ECG    TTE 9/16/2021:  LEFT VENTRICLE:  Systolic function was normal  Ejection fraction was estimated to be 58 %  There were no regional wall motion abnormalities  There was no evidence of concentric hypertrophy  Doppler parameters were consistent with abnormal left ventricular relaxation (grade 1 diastolic dysfunction)      AORTIC VALVE:  There was mild regurgitation      TRICUSPID VALVE:  There was trace regurgitation        Lab Results   Component Value Date    WBC 10 98 (H) 09/30/2021    HGB 9 7 (L) 09/30/2021    HCT 30 5 (L) 09/30/2021    MCV 99 (H) 09/30/2021     09/30/2021     Lab Results   Component Value Date    SODIUM 137 09/30/2021    SODIUM 136 09/30/2021    K 4 3 09/30/2021    K 4 3 09/30/2021     (H) 09/30/2021     09/30/2021    CO2 26 09/30/2021    CO2 24 09/30/2021    BUN 19 09/30/2021    BUN 18 09/30/2021    CREATININE 0 50 (L) 09/30/2021    CREATININE 0 52 (L) 09/30/2021    GLUC 107 09/30/2021    GLUC 98 09/30/2021    CALCIUM 8 5 09/30/2021    CALCIUM 9 1 09/30/2021     Lab Results   Component Value Date    INR 0 99 09/16/2021    PROTIME 12 8 09/16/2021     Lab Results   Component Value Date    HGBA1C 6 8 (H) 09/16/2021          Physical Exam    Airway  Comment: Patient unable to participate in exam           Dental       Cardiovascular  Cardiovascular exam normal    Pulmonary  Pulmonary exam normal     Other Findings        Anesthesia Plan  ASA Score- 3     Anesthesia Type- general with ASA Monitors  Additional Monitors:   Airway Plan: ETT  Plan Factors-    Chart reviewed  EKG reviewed  Existing labs reviewed  Patient summary reviewed  Induction- intravenous and rapid sequence induction  Postoperative Plan-   Planned trial extubation    Informed Consent- Anesthetic plan and risks discussed with healthcare power of  and daughter  I personally reviewed this patient with the CRNA  Discussed and agreed on the Anesthesia Plan with the CRNA  Berenice Groves

## 2021-09-30 NOTE — ANESTHESIA POSTPROCEDURE EVALUATION
Post-Op Assessment Note    CV Status:  Stable    Pain management: adequate     Mental Status:  Alert and awake   Hydration Status:  Euvolemic   PONV Controlled:  Controlled   Airway Patency:  Patent      Post Op Vitals Reviewed: Yes      Staff: Anesthesiologist, CRNA         No complications documented      BP   161/78   Temp      Pulse  84   Resp   16   SpO2   96

## 2021-10-01 ENCOUNTER — APPOINTMENT (INPATIENT)
Dept: RADIOLOGY | Facility: HOSPITAL | Age: 81
DRG: 064 | End: 2021-10-01
Payer: COMMERCIAL

## 2021-10-01 ENCOUNTER — TELEPHONE (OUTPATIENT)
Dept: NEUROLOGY | Facility: CLINIC | Age: 81
End: 2021-10-01

## 2021-10-01 PROBLEM — R50.9 FEVER: Status: RESOLVED | Noted: 2021-09-22 | Resolved: 2021-10-01

## 2021-10-01 PROBLEM — E44.0 MODERATE PROTEIN-CALORIE MALNUTRITION (HCC): Status: ACTIVE | Noted: 2021-10-01

## 2021-10-01 LAB
ALBUMIN SERPL BCP-MCNC: 2 G/DL (ref 3.5–5)
ALP SERPL-CCNC: 81 U/L (ref 46–116)
ALT SERPL W P-5'-P-CCNC: 105 U/L (ref 12–78)
ANION GAP SERPL CALCULATED.3IONS-SCNC: 5 MMOL/L (ref 4–13)
AST SERPL W P-5'-P-CCNC: 46 U/L (ref 5–45)
BASOPHILS # BLD AUTO: 0.02 THOUSANDS/ΜL (ref 0–0.1)
BASOPHILS NFR BLD AUTO: 0 % (ref 0–1)
BILIRUB SERPL-MCNC: 0.55 MG/DL (ref 0.2–1)
BUN SERPL-MCNC: 18 MG/DL (ref 5–25)
CALCIUM ALBUM COR SERPL-MCNC: 10.1 MG/DL (ref 8.3–10.1)
CALCIUM SERPL-MCNC: 8.5 MG/DL (ref 8.3–10.1)
CHLORIDE SERPL-SCNC: 110 MMOL/L (ref 100–108)
CO2 SERPL-SCNC: 25 MMOL/L (ref 21–32)
CREAT SERPL-MCNC: 0.6 MG/DL (ref 0.6–1.3)
EOSINOPHIL # BLD AUTO: 0.14 THOUSAND/ΜL (ref 0–0.61)
EOSINOPHIL NFR BLD AUTO: 1 % (ref 0–6)
ERYTHROCYTE [DISTWIDTH] IN BLOOD BY AUTOMATED COUNT: 13.5 % (ref 11.6–15.1)
GFR SERPL CREATININE-BSD FRML MDRD: 93 ML/MIN/1.73SQ M
GLUCOSE SERPL-MCNC: 125 MG/DL (ref 65–140)
GLUCOSE SERPL-MCNC: 142 MG/DL (ref 65–140)
GLUCOSE SERPL-MCNC: 177 MG/DL (ref 65–140)
GLUCOSE SERPL-MCNC: 325 MG/DL (ref 65–140)
HCT VFR BLD AUTO: 28.9 % (ref 36.5–49.3)
HGB BLD-MCNC: 9.1 G/DL (ref 12–17)
IMM GRANULOCYTES # BLD AUTO: 0.08 THOUSAND/UL (ref 0–0.2)
IMM GRANULOCYTES NFR BLD AUTO: 1 % (ref 0–2)
LYMPHOCYTES # BLD AUTO: 1.36 THOUSANDS/ΜL (ref 0.6–4.47)
LYMPHOCYTES NFR BLD AUTO: 12 % (ref 14–44)
MCH RBC QN AUTO: 31.3 PG (ref 26.8–34.3)
MCHC RBC AUTO-ENTMCNC: 31.5 G/DL (ref 31.4–37.4)
MCV RBC AUTO: 99 FL (ref 82–98)
MONOCYTES # BLD AUTO: 0.89 THOUSAND/ΜL (ref 0.17–1.22)
MONOCYTES NFR BLD AUTO: 8 % (ref 4–12)
NEUTROPHILS # BLD AUTO: 9.31 THOUSANDS/ΜL (ref 1.85–7.62)
NEUTS SEG NFR BLD AUTO: 78 % (ref 43–75)
NRBC BLD AUTO-RTO: 0 /100 WBCS
PLATELET # BLD AUTO: 367 THOUSANDS/UL (ref 149–390)
PMV BLD AUTO: 10.9 FL (ref 8.9–12.7)
POTASSIUM SERPL-SCNC: 3.8 MMOL/L (ref 3.5–5.3)
PROT SERPL-MCNC: 5.9 G/DL (ref 6.4–8.2)
RBC # BLD AUTO: 2.91 MILLION/UL (ref 3.88–5.62)
SODIUM SERPL-SCNC: 140 MMOL/L (ref 136–145)
WBC # BLD AUTO: 11.8 THOUSAND/UL (ref 4.31–10.16)

## 2021-10-01 PROCEDURE — 82948 REAGENT STRIP/BLOOD GLUCOSE: CPT

## 2021-10-01 PROCEDURE — 99232 SBSQ HOSP IP/OBS MODERATE 35: CPT | Performed by: INTERNAL MEDICINE

## 2021-10-01 PROCEDURE — 76705 ECHO EXAM OF ABDOMEN: CPT

## 2021-10-01 PROCEDURE — 85025 COMPLETE CBC W/AUTO DIFF WBC: CPT | Performed by: STUDENT IN AN ORGANIZED HEALTH CARE EDUCATION/TRAINING PROGRAM

## 2021-10-01 PROCEDURE — 99232 SBSQ HOSP IP/OBS MODERATE 35: CPT | Performed by: SURGERY

## 2021-10-01 PROCEDURE — 80053 COMPREHEN METABOLIC PANEL: CPT | Performed by: STUDENT IN AN ORGANIZED HEALTH CARE EDUCATION/TRAINING PROGRAM

## 2021-10-01 RX ADMIN — HEPARIN SODIUM 5000 UNITS: 5000 INJECTION INTRAVENOUS; SUBCUTANEOUS at 18:49

## 2021-10-01 RX ADMIN — ACETAMINOPHEN 500 MG: 650 SUSPENSION ORAL at 19:00

## 2021-10-01 RX ADMIN — DEXTROSE AND SODIUM CHLORIDE 50 ML/HR: 5; .9 INJECTION, SOLUTION INTRAVENOUS at 02:47

## 2021-10-01 RX ADMIN — HEPARIN SODIUM 5000 UNITS: 5000 INJECTION INTRAVENOUS; SUBCUTANEOUS at 21:09

## 2021-10-01 RX ADMIN — INSULIN LISPRO 1 UNITS: 100 INJECTION, SOLUTION INTRAVENOUS; SUBCUTANEOUS at 18:44

## 2021-10-01 RX ADMIN — ACETAMINOPHEN 650 MG: 650 SUPPOSITORY RECTAL at 06:07

## 2021-10-01 RX ADMIN — HEPARIN SODIUM 5000 UNITS: 5000 INJECTION INTRAVENOUS; SUBCUTANEOUS at 06:17

## 2021-10-01 RX ADMIN — ACETAMINOPHEN 650 MG: 650 SUPPOSITORY RECTAL at 10:23

## 2021-10-01 RX ADMIN — ATORVASTATIN CALCIUM 40 MG: 40 TABLET, FILM COATED ORAL at 21:10

## 2021-10-02 PROBLEM — N28.1 RENAL CYST, RIGHT: Status: ACTIVE | Noted: 2021-10-02

## 2021-10-02 PROBLEM — D72.829 LEUKOCYTOSIS: Status: ACTIVE | Noted: 2021-10-02

## 2021-10-02 LAB
ALBUMIN SERPL BCP-MCNC: 2 G/DL (ref 3.5–5)
ALP SERPL-CCNC: 93 U/L (ref 46–116)
ALT SERPL W P-5'-P-CCNC: 87 U/L (ref 12–78)
ANION GAP SERPL CALCULATED.3IONS-SCNC: 4 MMOL/L (ref 4–13)
AST SERPL W P-5'-P-CCNC: 39 U/L (ref 5–45)
BACTERIA UR QL AUTO: ABNORMAL /HPF
BASOPHILS # BLD AUTO: 0.03 THOUSANDS/ΜL (ref 0–0.1)
BASOPHILS NFR BLD AUTO: 0 % (ref 0–1)
BILIRUB SERPL-MCNC: 0.67 MG/DL (ref 0.2–1)
BILIRUB UR QL STRIP: NEGATIVE
BUN SERPL-MCNC: 18 MG/DL (ref 5–25)
CALCIUM ALBUM COR SERPL-MCNC: 10.2 MG/DL (ref 8.3–10.1)
CALCIUM SERPL-MCNC: 8.6 MG/DL (ref 8.3–10.1)
CHLORIDE SERPL-SCNC: 107 MMOL/L (ref 100–108)
CLARITY UR: ABNORMAL
CO2 SERPL-SCNC: 28 MMOL/L (ref 21–32)
COLOR UR: YELLOW
CREAT SERPL-MCNC: 0.52 MG/DL (ref 0.6–1.3)
EOSINOPHIL # BLD AUTO: 0.11 THOUSAND/ΜL (ref 0–0.61)
EOSINOPHIL NFR BLD AUTO: 1 % (ref 0–6)
ERYTHROCYTE [DISTWIDTH] IN BLOOD BY AUTOMATED COUNT: 13.8 % (ref 11.6–15.1)
GFR SERPL CREATININE-BSD FRML MDRD: 99 ML/MIN/1.73SQ M
GLUCOSE SERPL-MCNC: 125 MG/DL (ref 65–140)
GLUCOSE SERPL-MCNC: 133 MG/DL (ref 65–140)
GLUCOSE SERPL-MCNC: 137 MG/DL (ref 65–140)
GLUCOSE SERPL-MCNC: 158 MG/DL (ref 65–140)
GLUCOSE SERPL-MCNC: 211 MG/DL (ref 65–140)
GLUCOSE UR STRIP-MCNC: NEGATIVE MG/DL
HCT VFR BLD AUTO: 30.1 % (ref 36.5–49.3)
HGB BLD-MCNC: 9.6 G/DL (ref 12–17)
HGB UR QL STRIP.AUTO: NEGATIVE
HYALINE CASTS #/AREA URNS LPF: ABNORMAL /LPF
IMM GRANULOCYTES # BLD AUTO: 0.08 THOUSAND/UL (ref 0–0.2)
IMM GRANULOCYTES NFR BLD AUTO: 1 % (ref 0–2)
KETONES UR STRIP-MCNC: NEGATIVE MG/DL
LEUKOCYTE ESTERASE UR QL STRIP: NEGATIVE
LYMPHOCYTES # BLD AUTO: 1.73 THOUSANDS/ΜL (ref 0.6–4.47)
LYMPHOCYTES NFR BLD AUTO: 11 % (ref 14–44)
MCH RBC QN AUTO: 32.1 PG (ref 26.8–34.3)
MCHC RBC AUTO-ENTMCNC: 31.9 G/DL (ref 31.4–37.4)
MCV RBC AUTO: 101 FL (ref 82–98)
MONOCYTES # BLD AUTO: 1.09 THOUSAND/ΜL (ref 0.17–1.22)
MONOCYTES NFR BLD AUTO: 7 % (ref 4–12)
NEUTROPHILS # BLD AUTO: 12.2 THOUSANDS/ΜL (ref 1.85–7.62)
NEUTS SEG NFR BLD AUTO: 80 % (ref 43–75)
NITRITE UR QL STRIP: POSITIVE
NON-SQ EPI CELLS URNS QL MICRO: ABNORMAL /HPF
NRBC BLD AUTO-RTO: 0 /100 WBCS
PH UR STRIP.AUTO: 6 [PH]
PLATELET # BLD AUTO: 376 THOUSANDS/UL (ref 149–390)
PMV BLD AUTO: 11.1 FL (ref 8.9–12.7)
POTASSIUM SERPL-SCNC: 3.6 MMOL/L (ref 3.5–5.3)
PROCALCITONIN SERPL-MCNC: 0.15 NG/ML
PROT SERPL-MCNC: 6.6 G/DL (ref 6.4–8.2)
PROT UR STRIP-MCNC: NEGATIVE MG/DL
RBC # BLD AUTO: 2.99 MILLION/UL (ref 3.88–5.62)
RBC #/AREA URNS AUTO: ABNORMAL /HPF
SODIUM SERPL-SCNC: 139 MMOL/L (ref 136–145)
SP GR UR STRIP.AUTO: 1.03 (ref 1–1.03)
UROBILINOGEN UR QL STRIP.AUTO: 2 E.U./DL
WBC # BLD AUTO: 15.24 THOUSAND/UL (ref 4.31–10.16)
WBC #/AREA URNS AUTO: ABNORMAL /HPF

## 2021-10-02 PROCEDURE — 82948 REAGENT STRIP/BLOOD GLUCOSE: CPT

## 2021-10-02 PROCEDURE — 81001 URINALYSIS AUTO W/SCOPE: CPT | Performed by: STUDENT IN AN ORGANIZED HEALTH CARE EDUCATION/TRAINING PROGRAM

## 2021-10-02 PROCEDURE — 87077 CULTURE AEROBIC IDENTIFY: CPT | Performed by: INTERNAL MEDICINE

## 2021-10-02 PROCEDURE — 87186 SC STD MICRODIL/AGAR DIL: CPT | Performed by: INTERNAL MEDICINE

## 2021-10-02 PROCEDURE — 84145 PROCALCITONIN (PCT): CPT | Performed by: STUDENT IN AN ORGANIZED HEALTH CARE EDUCATION/TRAINING PROGRAM

## 2021-10-02 PROCEDURE — 80053 COMPREHEN METABOLIC PANEL: CPT | Performed by: STUDENT IN AN ORGANIZED HEALTH CARE EDUCATION/TRAINING PROGRAM

## 2021-10-02 PROCEDURE — 87181 SC STD AGAR DILUTION PER AGT: CPT | Performed by: INTERNAL MEDICINE

## 2021-10-02 PROCEDURE — 99232 SBSQ HOSP IP/OBS MODERATE 35: CPT | Performed by: INTERNAL MEDICINE

## 2021-10-02 PROCEDURE — 87086 URINE CULTURE/COLONY COUNT: CPT | Performed by: INTERNAL MEDICINE

## 2021-10-02 PROCEDURE — 85025 COMPLETE CBC W/AUTO DIFF WBC: CPT | Performed by: STUDENT IN AN ORGANIZED HEALTH CARE EDUCATION/TRAINING PROGRAM

## 2021-10-02 RX ADMIN — ACETAMINOPHEN 500 MG: 650 SUSPENSION ORAL at 17:40

## 2021-10-02 RX ADMIN — ACETAMINOPHEN 500 MG: 650 SUSPENSION ORAL at 12:08

## 2021-10-02 RX ADMIN — MODAFINIL 100 MG: 100 TABLET ORAL at 08:35

## 2021-10-02 RX ADMIN — ATORVASTATIN CALCIUM 40 MG: 40 TABLET, FILM COATED ORAL at 15:30

## 2021-10-02 RX ADMIN — AMLODIPINE BESYLATE 10 MG: 10 TABLET ORAL at 08:32

## 2021-10-02 RX ADMIN — DOCUSATE SODIUM AND SENNOSIDES 1 TABLET: 8.6; 5 TABLET ORAL at 21:28

## 2021-10-02 RX ADMIN — DOXAZOSIN 2 MG: 2 TABLET ORAL at 08:32

## 2021-10-02 RX ADMIN — HEPARIN SODIUM 5000 UNITS: 5000 INJECTION INTRAVENOUS; SUBCUTANEOUS at 21:28

## 2021-10-02 RX ADMIN — INSULIN LISPRO 2 UNITS: 100 INJECTION, SOLUTION INTRAVENOUS; SUBCUTANEOUS at 17:40

## 2021-10-02 RX ADMIN — HEPARIN SODIUM 5000 UNITS: 5000 INJECTION INTRAVENOUS; SUBCUTANEOUS at 06:00

## 2021-10-02 RX ADMIN — HEPARIN SODIUM 5000 UNITS: 5000 INJECTION INTRAVENOUS; SUBCUTANEOUS at 15:30

## 2021-10-02 RX ADMIN — FINASTERIDE 5 MG: 5 TABLET, FILM COATED ORAL at 08:32

## 2021-10-02 RX ADMIN — LISINOPRIL 40 MG: 20 TABLET ORAL at 08:32

## 2021-10-02 RX ADMIN — INSULIN LISPRO 1 UNITS: 100 INJECTION, SOLUTION INTRAVENOUS; SUBCUTANEOUS at 12:07

## 2021-10-02 RX ADMIN — DEXTROSE 1000 MG: 50 INJECTION, SOLUTION INTRAVENOUS at 15:31

## 2021-10-02 RX ADMIN — ACETAMINOPHEN 500 MG: 650 SUSPENSION ORAL at 05:59

## 2021-10-02 RX ADMIN — ACETAMINOPHEN 500 MG: 650 SUSPENSION ORAL at 01:33

## 2021-10-03 ENCOUNTER — APPOINTMENT (INPATIENT)
Dept: RADIOLOGY | Facility: HOSPITAL | Age: 81
DRG: 064 | End: 2021-10-03
Payer: COMMERCIAL

## 2021-10-03 PROBLEM — R65.10 SIRS (SYSTEMIC INFLAMMATORY RESPONSE SYNDROME) (HCC): Status: ACTIVE | Noted: 2021-10-03

## 2021-10-03 LAB
ANION GAP SERPL CALCULATED.3IONS-SCNC: 5 MMOL/L (ref 4–13)
ANION GAP SERPL CALCULATED.3IONS-SCNC: 5 MMOL/L (ref 4–13)
ANISOCYTOSIS BLD QL SMEAR: PRESENT
ARTIFACT: PRESENT
BASOPHILS # BLD AUTO: 0.02 THOUSANDS/ΜL (ref 0–0.1)
BASOPHILS # BLD AUTO: 0.03 THOUSANDS/ΜL (ref 0–0.1)
BASOPHILS # BLD MANUAL: 0 THOUSAND/UL (ref 0–0.1)
BASOPHILS NFR BLD AUTO: 0 % (ref 0–1)
BASOPHILS NFR BLD AUTO: 0 % (ref 0–1)
BASOPHILS NFR MAR MANUAL: 0 % (ref 0–1)
BUN SERPL-MCNC: 29 MG/DL (ref 5–25)
BUN SERPL-MCNC: 47 MG/DL (ref 5–25)
CALCIUM SERPL-MCNC: 8.9 MG/DL (ref 8.3–10.1)
CALCIUM SERPL-MCNC: 8.9 MG/DL (ref 8.3–10.1)
CHLORIDE SERPL-SCNC: 106 MMOL/L (ref 100–108)
CHLORIDE SERPL-SCNC: 107 MMOL/L (ref 100–108)
CO2 SERPL-SCNC: 26 MMOL/L (ref 21–32)
CO2 SERPL-SCNC: 26 MMOL/L (ref 21–32)
CREAT SERPL-MCNC: 0.61 MG/DL (ref 0.6–1.3)
CREAT SERPL-MCNC: 0.96 MG/DL (ref 0.6–1.3)
EOSINOPHIL # BLD AUTO: 0.02 THOUSAND/ΜL (ref 0–0.61)
EOSINOPHIL # BLD AUTO: 0.05 THOUSAND/ΜL (ref 0–0.61)
EOSINOPHIL # BLD MANUAL: 0.1 THOUSAND/UL (ref 0–0.4)
EOSINOPHIL NFR BLD AUTO: 0 % (ref 0–6)
EOSINOPHIL NFR BLD AUTO: 0 % (ref 0–6)
EOSINOPHIL NFR BLD MANUAL: 1 % (ref 0–6)
ERYTHROCYTE [DISTWIDTH] IN BLOOD BY AUTOMATED COUNT: 14.1 % (ref 11.6–15.1)
ERYTHROCYTE [DISTWIDTH] IN BLOOD BY AUTOMATED COUNT: 14.5 % (ref 11.6–15.1)
ERYTHROCYTE [DISTWIDTH] IN BLOOD BY AUTOMATED COUNT: 14.6 % (ref 11.6–15.1)
FLUAV RNA RESP QL NAA+PROBE: NEGATIVE
FLUBV RNA RESP QL NAA+PROBE: NEGATIVE
GFR SERPL CREATININE-BSD FRML MDRD: 73 ML/MIN/1.73SQ M
GFR SERPL CREATININE-BSD FRML MDRD: 92 ML/MIN/1.73SQ M
GLUCOSE SERPL-MCNC: 174 MG/DL (ref 65–140)
GLUCOSE SERPL-MCNC: 207 MG/DL (ref 65–140)
GLUCOSE SERPL-MCNC: 220 MG/DL (ref 65–140)
GLUCOSE SERPL-MCNC: 228 MG/DL (ref 65–140)
GLUCOSE SERPL-MCNC: 229 MG/DL (ref 65–140)
GLUCOSE SERPL-MCNC: 257 MG/DL (ref 65–140)
HCT VFR BLD AUTO: 29.5 % (ref 36.5–49.3)
HCT VFR BLD AUTO: 31.8 % (ref 36.5–49.3)
HCT VFR BLD AUTO: 32.9 % (ref 36.5–49.3)
HGB BLD-MCNC: 10.1 G/DL (ref 12–17)
HGB BLD-MCNC: 10.4 G/DL (ref 12–17)
HGB BLD-MCNC: 9.4 G/DL (ref 12–17)
IMM GRANULOCYTES # BLD AUTO: 0.06 THOUSAND/UL (ref 0–0.2)
IMM GRANULOCYTES # BLD AUTO: 0.1 THOUSAND/UL (ref 0–0.2)
IMM GRANULOCYTES NFR BLD AUTO: 1 % (ref 0–2)
IMM GRANULOCYTES NFR BLD AUTO: 1 % (ref 0–2)
LACTATE SERPL-SCNC: 1.5 MMOL/L (ref 0.5–2)
LACTATE SERPL-SCNC: 1.7 MMOL/L (ref 0.5–2)
LYMPHOCYTES # BLD AUTO: 0.78 THOUSANDS/ΜL (ref 0.6–4.47)
LYMPHOCYTES # BLD AUTO: 0.92 THOUSANDS/ΜL (ref 0.6–4.47)
LYMPHOCYTES # BLD AUTO: 1.28 THOUSAND/UL (ref 0.6–4.47)
LYMPHOCYTES # BLD AUTO: 13 % (ref 14–44)
LYMPHOCYTES NFR BLD AUTO: 5 % (ref 14–44)
LYMPHOCYTES NFR BLD AUTO: 8 % (ref 14–44)
MCH RBC QN AUTO: 31.6 PG (ref 26.8–34.3)
MCH RBC QN AUTO: 31.6 PG (ref 26.8–34.3)
MCH RBC QN AUTO: 31.9 PG (ref 26.8–34.3)
MCHC RBC AUTO-ENTMCNC: 31.6 G/DL (ref 31.4–37.4)
MCHC RBC AUTO-ENTMCNC: 31.8 G/DL (ref 31.4–37.4)
MCHC RBC AUTO-ENTMCNC: 31.9 G/DL (ref 31.4–37.4)
MCV RBC AUTO: 101 FL (ref 82–98)
MCV RBC AUTO: 99 FL (ref 82–98)
MCV RBC AUTO: 99 FL (ref 82–98)
MONOCYTES # BLD AUTO: 0.49 THOUSAND/UL (ref 0–1.22)
MONOCYTES # BLD AUTO: 0.95 THOUSAND/ΜL (ref 0.17–1.22)
MONOCYTES # BLD AUTO: 1.14 THOUSAND/ΜL (ref 0.17–1.22)
MONOCYTES NFR BLD AUTO: 7 % (ref 4–12)
MONOCYTES NFR BLD AUTO: 8 % (ref 4–12)
MONOCYTES NFR BLD: 5 % (ref 4–12)
NEUTROPHILS # BLD AUTO: 10.24 THOUSANDS/ΜL (ref 1.85–7.62)
NEUTROPHILS # BLD AUTO: 14.56 THOUSANDS/ΜL (ref 1.85–7.62)
NEUTROPHILS # BLD MANUAL: 7.66 THOUSAND/UL (ref 1.85–7.62)
NEUTS BAND NFR BLD MANUAL: 6 % (ref 0–8)
NEUTS SEG NFR BLD AUTO: 72 % (ref 43–75)
NEUTS SEG NFR BLD AUTO: 83 % (ref 43–75)
NEUTS SEG NFR BLD AUTO: 87 % (ref 43–75)
NRBC BLD AUTO-RTO: 0 /100 WBCS
NRBC BLD AUTO-RTO: 0 /100 WBCS
PLATELET # BLD AUTO: 383 THOUSANDS/UL (ref 149–390)
PLATELET # BLD AUTO: 445 THOUSANDS/UL (ref 149–390)
PLATELET # BLD AUTO: 468 THOUSANDS/UL (ref 149–390)
PLATELET BLD QL SMEAR: ADEQUATE
PMV BLD AUTO: 10.2 FL (ref 8.9–12.7)
PMV BLD AUTO: 10.5 FL (ref 8.9–12.7)
PMV BLD AUTO: 11 FL (ref 8.9–12.7)
POIKILOCYTOSIS BLD QL SMEAR: PRESENT
POTASSIUM SERPL-SCNC: 4 MMOL/L (ref 3.5–5.3)
POTASSIUM SERPL-SCNC: 4.1 MMOL/L (ref 3.5–5.3)
PROCALCITONIN SERPL-MCNC: 0.35 NG/ML
RBC # BLD AUTO: 2.97 MILLION/UL (ref 3.88–5.62)
RBC # BLD AUTO: 3.2 MILLION/UL (ref 3.88–5.62)
RBC # BLD AUTO: 3.26 MILLION/UL (ref 3.88–5.62)
RBC MORPH BLD: PRESENT
RSV RNA RESP QL NAA+PROBE: NEGATIVE
SARS-COV-2 RNA RESP QL NAA+PROBE: NEGATIVE
SODIUM SERPL-SCNC: 137 MMOL/L (ref 136–145)
SODIUM SERPL-SCNC: 138 MMOL/L (ref 136–145)
VARIANT LYMPHS # BLD AUTO: 3 %
WBC # BLD AUTO: 12.21 THOUSAND/UL (ref 4.31–10.16)
WBC # BLD AUTO: 16.66 THOUSAND/UL (ref 4.31–10.16)
WBC # BLD AUTO: 9.82 THOUSAND/UL (ref 4.31–10.16)

## 2021-10-03 PROCEDURE — G1004 CDSM NDSC: HCPCS

## 2021-10-03 PROCEDURE — 85027 COMPLETE CBC AUTOMATED: CPT | Performed by: SURGERY

## 2021-10-03 PROCEDURE — 0241U HB NFCT DS VIR RESP RNA 4 TRGT: CPT | Performed by: STUDENT IN AN ORGANIZED HEALTH CARE EDUCATION/TRAINING PROGRAM

## 2021-10-03 PROCEDURE — 80048 BASIC METABOLIC PNL TOTAL CA: CPT | Performed by: INTERNAL MEDICINE

## 2021-10-03 PROCEDURE — 82948 REAGENT STRIP/BLOOD GLUCOSE: CPT

## 2021-10-03 PROCEDURE — 74176 CT ABD & PELVIS W/O CONTRAST: CPT

## 2021-10-03 PROCEDURE — 74174 CTA ABD&PLVS W/CONTRAST: CPT

## 2021-10-03 PROCEDURE — 71250 CT THORAX DX C-: CPT

## 2021-10-03 PROCEDURE — 85007 BL SMEAR W/DIFF WBC COUNT: CPT | Performed by: SURGERY

## 2021-10-03 PROCEDURE — C9113 INJ PANTOPRAZOLE SODIUM, VIA: HCPCS | Performed by: STUDENT IN AN ORGANIZED HEALTH CARE EDUCATION/TRAINING PROGRAM

## 2021-10-03 PROCEDURE — 83605 ASSAY OF LACTIC ACID: CPT | Performed by: SURGERY

## 2021-10-03 PROCEDURE — 99232 SBSQ HOSP IP/OBS MODERATE 35: CPT | Performed by: INTERNAL MEDICINE

## 2021-10-03 PROCEDURE — 85025 COMPLETE CBC W/AUTO DIFF WBC: CPT | Performed by: INTERNAL MEDICINE

## 2021-10-03 PROCEDURE — 84145 PROCALCITONIN (PCT): CPT | Performed by: STUDENT IN AN ORGANIZED HEALTH CARE EDUCATION/TRAINING PROGRAM

## 2021-10-03 PROCEDURE — 83605 ASSAY OF LACTIC ACID: CPT | Performed by: STUDENT IN AN ORGANIZED HEALTH CARE EDUCATION/TRAINING PROGRAM

## 2021-10-03 PROCEDURE — 93005 ELECTROCARDIOGRAM TRACING: CPT

## 2021-10-03 PROCEDURE — 87040 BLOOD CULTURE FOR BACTERIA: CPT | Performed by: STUDENT IN AN ORGANIZED HEALTH CARE EDUCATION/TRAINING PROGRAM

## 2021-10-03 RX ORDER — SODIUM CHLORIDE, SODIUM GLUCONATE, SODIUM ACETATE, POTASSIUM CHLORIDE, MAGNESIUM CHLORIDE, SODIUM PHOSPHATE, DIBASIC, AND POTASSIUM PHOSPHATE .53; .5; .37; .037; .03; .012; .00082 G/100ML; G/100ML; G/100ML; G/100ML; G/100ML; G/100ML; G/100ML
1000 INJECTION, SOLUTION INTRAVENOUS ONCE
Status: COMPLETED | OUTPATIENT
Start: 2021-10-03 | End: 2021-10-03

## 2021-10-03 RX ORDER — LABETALOL 20 MG/4 ML (5 MG/ML) INTRAVENOUS SYRINGE
10 EVERY 4 HOURS PRN
Status: DISCONTINUED | OUTPATIENT
Start: 2021-10-03 | End: 2021-10-12 | Stop reason: HOSPADM

## 2021-10-03 RX ORDER — INSULIN GLARGINE 100 [IU]/ML
10 INJECTION, SOLUTION SUBCUTANEOUS EVERY MORNING
Status: DISCONTINUED | OUTPATIENT
Start: 2021-10-03 | End: 2021-10-03

## 2021-10-03 RX ORDER — DEXTROSE, SODIUM CHLORIDE, SODIUM LACTATE, POTASSIUM CHLORIDE, AND CALCIUM CHLORIDE 5; .6; .31; .03; .02 G/100ML; G/100ML; G/100ML; G/100ML; G/100ML
125 INJECTION, SOLUTION INTRAVENOUS CONTINUOUS
Status: DISCONTINUED | OUTPATIENT
Start: 2021-10-03 | End: 2021-10-04

## 2021-10-03 RX ORDER — DEXTROSE AND SODIUM CHLORIDE 5; .45 G/100ML; G/100ML
125 INJECTION, SOLUTION INTRAVENOUS CONTINUOUS
Status: DISCONTINUED | OUTPATIENT
Start: 2021-10-03 | End: 2021-10-03

## 2021-10-03 RX ADMIN — ACETAMINOPHEN 650 MG: 650 SUPPOSITORY RECTAL at 07:59

## 2021-10-03 RX ADMIN — DEXTROSE 1000 MG: 50 INJECTION, SOLUTION INTRAVENOUS at 13:39

## 2021-10-03 RX ADMIN — HEPARIN SODIUM 5000 UNITS: 5000 INJECTION INTRAVENOUS; SUBCUTANEOUS at 05:26

## 2021-10-03 RX ADMIN — INSULIN GLARGINE 10 UNITS: 100 INJECTION, SOLUTION SUBCUTANEOUS at 08:11

## 2021-10-03 RX ADMIN — ACETAMINOPHEN 650 MG: 650 SUPPOSITORY RECTAL at 13:40

## 2021-10-03 RX ADMIN — SODIUM CHLORIDE, SODIUM GLUCONATE, SODIUM ACETATE, POTASSIUM CHLORIDE, MAGNESIUM CHLORIDE, SODIUM PHOSPHATE, DIBASIC, AND POTASSIUM PHOSPHATE 1000 ML: .53; .5; .37; .037; .03; .012; .00082 INJECTION, SOLUTION INTRAVENOUS at 16:40

## 2021-10-03 RX ADMIN — INSULIN LISPRO 2 UNITS: 100 INJECTION, SOLUTION INTRAVENOUS; SUBCUTANEOUS at 05:23

## 2021-10-03 RX ADMIN — ACETAMINOPHEN 500 MG: 650 SUSPENSION ORAL at 00:32

## 2021-10-03 RX ADMIN — SODIUM CHLORIDE 8 MG/HR: 9 INJECTION, SOLUTION INTRAVENOUS at 11:11

## 2021-10-03 RX ADMIN — DEXTROSE AND SODIUM CHLORIDE 75 ML/HR: 5; .45 INJECTION, SOLUTION INTRAVENOUS at 15:23

## 2021-10-03 RX ADMIN — PIPERACILLIN SODIUM, TAZOBACTAM SODIUM 3.38 G: 36; 4.5 INJECTION, POWDER, LYOPHILIZED, FOR SOLUTION INTRAVENOUS at 18:15

## 2021-10-03 RX ADMIN — INSULIN LISPRO 2 UNITS: 100 INJECTION, SOLUTION INTRAVENOUS; SUBCUTANEOUS at 00:36

## 2021-10-03 RX ADMIN — ACETAMINOPHEN 500 MG: 650 SUSPENSION ORAL at 05:23

## 2021-10-03 RX ADMIN — IOHEXOL 100 ML: 350 INJECTION, SOLUTION INTRAVENOUS at 16:31

## 2021-10-04 ENCOUNTER — APPOINTMENT (INPATIENT)
Dept: RADIOLOGY | Facility: HOSPITAL | Age: 81
DRG: 064 | End: 2021-10-04
Payer: COMMERCIAL

## 2021-10-04 ENCOUNTER — TELEPHONE (OUTPATIENT)
Dept: HEMATOLOGY ONCOLOGY | Facility: HOSPITAL | Age: 81
End: 2021-10-04

## 2021-10-04 ENCOUNTER — TELEPHONE (OUTPATIENT)
Dept: RADIOLOGY | Facility: HOSPITAL | Age: 81
End: 2021-10-04

## 2021-10-04 LAB
ALBUMIN SERPL BCP-MCNC: 1.7 G/DL (ref 3.5–5)
ALP SERPL-CCNC: 84 U/L (ref 46–116)
ALT SERPL W P-5'-P-CCNC: 48 U/L (ref 12–78)
ANION GAP SERPL CALCULATED.3IONS-SCNC: 5 MMOL/L (ref 4–13)
AST SERPL W P-5'-P-CCNC: 18 U/L (ref 5–45)
ATRIAL RATE: 102 BPM
BILIRUB SERPL-MCNC: 0.48 MG/DL (ref 0.2–1)
BUN SERPL-MCNC: 42 MG/DL (ref 5–25)
CALCIUM ALBUM COR SERPL-MCNC: 10.7 MG/DL (ref 8.3–10.1)
CALCIUM SERPL-MCNC: 8.9 MG/DL (ref 8.3–10.1)
CHLORIDE SERPL-SCNC: 110 MMOL/L (ref 100–108)
CO2 SERPL-SCNC: 28 MMOL/L (ref 21–32)
CREAT SERPL-MCNC: 0.82 MG/DL (ref 0.6–1.3)
ERYTHROCYTE [DISTWIDTH] IN BLOOD BY AUTOMATED COUNT: 14.6 % (ref 11.6–15.1)
GFR SERPL CREATININE-BSD FRML MDRD: 82 ML/MIN/1.73SQ M
GLUCOSE SERPL-MCNC: 100 MG/DL (ref 65–140)
GLUCOSE SERPL-MCNC: 137 MG/DL (ref 65–140)
GLUCOSE SERPL-MCNC: 222 MG/DL (ref 65–140)
GLUCOSE SERPL-MCNC: 92 MG/DL (ref 65–140)
GLUCOSE SERPL-MCNC: 94 MG/DL (ref 65–140)
GLUCOSE SERPL-MCNC: 96 MG/DL (ref 65–140)
GLUCOSE SERPL-MCNC: 97 MG/DL (ref 65–140)
GLUCOSE SERPL-MCNC: 97 MG/DL (ref 65–140)
HCT VFR BLD AUTO: 27.8 % (ref 36.5–49.3)
HGB BLD-MCNC: 8.9 G/DL (ref 12–17)
LACTATE SERPL-SCNC: 1.2 MMOL/L (ref 0.5–2)
MCH RBC QN AUTO: 31.7 PG (ref 26.8–34.3)
MCHC RBC AUTO-ENTMCNC: 32 G/DL (ref 31.4–37.4)
MCV RBC AUTO: 99 FL (ref 82–98)
NRBC BLD AUTO-RTO: 0 /100 WBCS
PLATELET # BLD AUTO: 370 THOUSANDS/UL (ref 149–390)
PMV BLD AUTO: 10.5 FL (ref 8.9–12.7)
POTASSIUM SERPL-SCNC: 3.7 MMOL/L (ref 3.5–5.3)
PR INTERVAL: 138 MS
PROCALCITONIN SERPL-MCNC: 1.49 NG/ML
PROT SERPL-MCNC: 6.3 G/DL (ref 6.4–8.2)
QRS AXIS: 159 DEGREES
QRSD INTERVAL: 122 MS
QT INTERVAL: 404 MS
QTC INTERVAL: 526 MS
RBC # BLD AUTO: 2.81 MILLION/UL (ref 3.88–5.62)
SODIUM SERPL-SCNC: 143 MMOL/L (ref 136–145)
T WAVE AXIS: 149 DEGREES
VENTRICULAR RATE: 102 BPM
WBC # BLD AUTO: 10.12 THOUSAND/UL (ref 4.31–10.16)

## 2021-10-04 PROCEDURE — 80053 COMPREHEN METABOLIC PANEL: CPT | Performed by: STUDENT IN AN ORGANIZED HEALTH CARE EDUCATION/TRAINING PROGRAM

## 2021-10-04 PROCEDURE — 93010 ELECTROCARDIOGRAM REPORT: CPT | Performed by: INTERNAL MEDICINE

## 2021-10-04 PROCEDURE — C9113 INJ PANTOPRAZOLE SODIUM, VIA: HCPCS | Performed by: STUDENT IN AN ORGANIZED HEALTH CARE EDUCATION/TRAINING PROGRAM

## 2021-10-04 PROCEDURE — 70450 CT HEAD/BRAIN W/O DYE: CPT

## 2021-10-04 PROCEDURE — 99232 SBSQ HOSP IP/OBS MODERATE 35: CPT | Performed by: INTERNAL MEDICINE

## 2021-10-04 PROCEDURE — 74250 X-RAY XM SM INT 1CNTRST STD: CPT

## 2021-10-04 PROCEDURE — 99222 1ST HOSP IP/OBS MODERATE 55: CPT | Performed by: INTERNAL MEDICINE

## 2021-10-04 PROCEDURE — 82948 REAGENT STRIP/BLOOD GLUCOSE: CPT

## 2021-10-04 PROCEDURE — G1004 CDSM NDSC: HCPCS

## 2021-10-04 PROCEDURE — 85027 COMPLETE CBC AUTOMATED: CPT | Performed by: SURGERY

## 2021-10-04 PROCEDURE — 84145 PROCALCITONIN (PCT): CPT | Performed by: STUDENT IN AN ORGANIZED HEALTH CARE EDUCATION/TRAINING PROGRAM

## 2021-10-04 PROCEDURE — 99232 SBSQ HOSP IP/OBS MODERATE 35: CPT | Performed by: SURGERY

## 2021-10-04 PROCEDURE — 83605 ASSAY OF LACTIC ACID: CPT | Performed by: SURGERY

## 2021-10-04 RX ORDER — PANTOPRAZOLE SODIUM 40 MG/1
40 INJECTION, POWDER, FOR SOLUTION INTRAVENOUS EVERY 12 HOURS SCHEDULED
Status: DISCONTINUED | OUTPATIENT
Start: 2021-10-04 | End: 2021-10-09

## 2021-10-04 RX ORDER — SODIUM CHLORIDE, SODIUM GLUCONATE, SODIUM ACETATE, POTASSIUM CHLORIDE, MAGNESIUM CHLORIDE, SODIUM PHOSPHATE, DIBASIC, AND POTASSIUM PHOSPHATE .53; .5; .37; .037; .03; .012; .00082 G/100ML; G/100ML; G/100ML; G/100ML; G/100ML; G/100ML; G/100ML
50 INJECTION, SOLUTION INTRAVENOUS CONTINUOUS
Status: DISCONTINUED | OUTPATIENT
Start: 2021-10-04 | End: 2021-10-05

## 2021-10-04 RX ORDER — HEPARIN SODIUM 5000 [USP'U]/ML
5000 INJECTION, SOLUTION INTRAVENOUS; SUBCUTANEOUS EVERY 8 HOURS SCHEDULED
Status: DISCONTINUED | OUTPATIENT
Start: 2021-10-04 | End: 2021-10-12 | Stop reason: HOSPADM

## 2021-10-04 RX ADMIN — PIPERACILLIN SODIUM, TAZOBACTAM SODIUM 3.38 G: 36; 4.5 INJECTION, POWDER, LYOPHILIZED, FOR SOLUTION INTRAVENOUS at 23:50

## 2021-10-04 RX ADMIN — PIPERACILLIN SODIUM, TAZOBACTAM SODIUM 3.38 G: 36; 4.5 INJECTION, POWDER, LYOPHILIZED, FOR SOLUTION INTRAVENOUS at 11:24

## 2021-10-04 RX ADMIN — PANTOPRAZOLE SODIUM 40 MG: 40 INJECTION, POWDER, FOR SOLUTION INTRAVENOUS at 21:36

## 2021-10-04 RX ADMIN — PIPERACILLIN SODIUM, TAZOBACTAM SODIUM 3.38 G: 36; 4.5 INJECTION, POWDER, LYOPHILIZED, FOR SOLUTION INTRAVENOUS at 05:56

## 2021-10-04 RX ADMIN — ACETAMINOPHEN 650 MG: 650 SUPPOSITORY RECTAL at 20:20

## 2021-10-04 RX ADMIN — PIPERACILLIN SODIUM, TAZOBACTAM SODIUM 3.38 G: 36; 4.5 INJECTION, POWDER, LYOPHILIZED, FOR SOLUTION INTRAVENOUS at 00:56

## 2021-10-04 RX ADMIN — PIPERACILLIN SODIUM, TAZOBACTAM SODIUM 3.38 G: 36; 4.5 INJECTION, POWDER, LYOPHILIZED, FOR SOLUTION INTRAVENOUS at 18:20

## 2021-10-04 RX ADMIN — PANTOPRAZOLE SODIUM 40 MG: 40 INJECTION, POWDER, FOR SOLUTION INTRAVENOUS at 11:24

## 2021-10-04 RX ADMIN — SODIUM CHLORIDE, SODIUM GLUCONATE, SODIUM ACETATE, POTASSIUM CHLORIDE, MAGNESIUM CHLORIDE, SODIUM PHOSPHATE, DIBASIC, AND POTASSIUM PHOSPHATE 75 ML/HR: .53; .5; .37; .037; .03; .012; .00082 INJECTION, SOLUTION INTRAVENOUS at 20:09

## 2021-10-04 RX ADMIN — SODIUM CHLORIDE 8 MG/HR: 9 INJECTION, SOLUTION INTRAVENOUS at 00:01

## 2021-10-04 RX ADMIN — SODIUM CHLORIDE, SODIUM GLUCONATE, SODIUM ACETATE, POTASSIUM CHLORIDE, MAGNESIUM CHLORIDE, SODIUM PHOSPHATE, DIBASIC, AND POTASSIUM PHOSPHATE 75 ML/HR: .53; .5; .37; .037; .03; .012; .00082 INJECTION, SOLUTION INTRAVENOUS at 05:12

## 2021-10-04 RX ADMIN — HEPARIN SODIUM 5000 UNITS: 5000 INJECTION INTRAVENOUS; SUBCUTANEOUS at 21:36

## 2021-10-04 RX ADMIN — DIATRIZOATE MEGLUMINE AND DIATRIZOATE SODIUM 120 ML: 660; 100 LIQUID ORAL; RECTAL at 10:30

## 2021-10-05 LAB
ALBUMIN SERPL BCP-MCNC: 1.6 G/DL (ref 3.5–5)
ALP SERPL-CCNC: 97 U/L (ref 46–116)
ALT SERPL W P-5'-P-CCNC: 60 U/L (ref 12–78)
ANION GAP SERPL CALCULATED.3IONS-SCNC: 4 MMOL/L (ref 4–13)
ANION GAP SERPL CALCULATED.3IONS-SCNC: 5 MMOL/L (ref 4–13)
AST SERPL W P-5'-P-CCNC: 56 U/L (ref 5–45)
BILIRUB DIRECT SERPL-MCNC: 0.26 MG/DL (ref 0–0.2)
BILIRUB SERPL-MCNC: 0.55 MG/DL (ref 0.2–1)
BUN SERPL-MCNC: 24 MG/DL (ref 5–25)
BUN SERPL-MCNC: 29 MG/DL (ref 5–25)
CALCIUM SERPL-MCNC: 8.3 MG/DL (ref 8.3–10.1)
CALCIUM SERPL-MCNC: 8.5 MG/DL (ref 8.3–10.1)
CHLORIDE SERPL-SCNC: 118 MMOL/L (ref 100–108)
CHLORIDE SERPL-SCNC: 119 MMOL/L (ref 100–108)
CO2 SERPL-SCNC: 27 MMOL/L (ref 21–32)
CO2 SERPL-SCNC: 27 MMOL/L (ref 21–32)
CREAT SERPL-MCNC: 0.6 MG/DL (ref 0.6–1.3)
CREAT SERPL-MCNC: 0.65 MG/DL (ref 0.6–1.3)
ERYTHROCYTE [DISTWIDTH] IN BLOOD BY AUTOMATED COUNT: 14.6 % (ref 11.6–15.1)
GFR SERPL CREATININE-BSD FRML MDRD: 90 ML/MIN/1.73SQ M
GFR SERPL CREATININE-BSD FRML MDRD: 93 ML/MIN/1.73SQ M
GLUCOSE SERPL-MCNC: 137 MG/DL (ref 65–140)
GLUCOSE SERPL-MCNC: 235 MG/DL (ref 65–140)
GLUCOSE SERPL-MCNC: 237 MG/DL (ref 65–140)
GLUCOSE SERPL-MCNC: 85 MG/DL (ref 65–140)
GLUCOSE SERPL-MCNC: 89 MG/DL (ref 65–140)
HCT VFR BLD AUTO: 26.5 % (ref 36.5–49.3)
HGB BLD-MCNC: 8.1 G/DL (ref 12–17)
MCH RBC QN AUTO: 31.2 PG (ref 26.8–34.3)
MCHC RBC AUTO-ENTMCNC: 30.6 G/DL (ref 31.4–37.4)
MCV RBC AUTO: 102 FL (ref 82–98)
NRBC BLD AUTO-RTO: 0 /100 WBCS
PLATELET # BLD AUTO: 352 THOUSANDS/UL (ref 149–390)
PMV BLD AUTO: 10.2 FL (ref 8.9–12.7)
POTASSIUM SERPL-SCNC: 2.9 MMOL/L (ref 3.5–5.3)
POTASSIUM SERPL-SCNC: 3.3 MMOL/L (ref 3.5–5.3)
PROCALCITONIN SERPL-MCNC: 0.87 NG/ML
PROT SERPL-MCNC: 6 G/DL (ref 6.4–8.2)
RBC # BLD AUTO: 2.6 MILLION/UL (ref 3.88–5.62)
SODIUM SERPL-SCNC: 149 MMOL/L (ref 136–145)
SODIUM SERPL-SCNC: 151 MMOL/L (ref 136–145)
WBC # BLD AUTO: 13.41 THOUSAND/UL (ref 4.31–10.16)

## 2021-10-05 PROCEDURE — 80076 HEPATIC FUNCTION PANEL: CPT | Performed by: STUDENT IN AN ORGANIZED HEALTH CARE EDUCATION/TRAINING PROGRAM

## 2021-10-05 PROCEDURE — 97535 SELF CARE MNGMENT TRAINING: CPT

## 2021-10-05 PROCEDURE — C9113 INJ PANTOPRAZOLE SODIUM, VIA: HCPCS | Performed by: STUDENT IN AN ORGANIZED HEALTH CARE EDUCATION/TRAINING PROGRAM

## 2021-10-05 PROCEDURE — 99233 SBSQ HOSP IP/OBS HIGH 50: CPT | Performed by: INTERNAL MEDICINE

## 2021-10-05 PROCEDURE — 80048 BASIC METABOLIC PNL TOTAL CA: CPT | Performed by: STUDENT IN AN ORGANIZED HEALTH CARE EDUCATION/TRAINING PROGRAM

## 2021-10-05 PROCEDURE — 84145 PROCALCITONIN (PCT): CPT | Performed by: STUDENT IN AN ORGANIZED HEALTH CARE EDUCATION/TRAINING PROGRAM

## 2021-10-05 PROCEDURE — 99232 SBSQ HOSP IP/OBS MODERATE 35: CPT | Performed by: SURGERY

## 2021-10-05 PROCEDURE — 99232 SBSQ HOSP IP/OBS MODERATE 35: CPT | Performed by: NEUROLOGICAL SURGERY

## 2021-10-05 PROCEDURE — 85027 COMPLETE CBC AUTOMATED: CPT | Performed by: STUDENT IN AN ORGANIZED HEALTH CARE EDUCATION/TRAINING PROGRAM

## 2021-10-05 PROCEDURE — 82948 REAGENT STRIP/BLOOD GLUCOSE: CPT

## 2021-10-05 PROCEDURE — 92526 ORAL FUNCTION THERAPY: CPT

## 2021-10-05 RX ORDER — POTASSIUM CHLORIDE 14.9 MG/ML
20 INJECTION INTRAVENOUS ONCE
Status: COMPLETED | OUTPATIENT
Start: 2021-10-05 | End: 2021-10-05

## 2021-10-05 RX ORDER — DEXTROSE, SODIUM CHLORIDE, SODIUM LACTATE, POTASSIUM CHLORIDE, AND CALCIUM CHLORIDE 5; .6; .31; .03; .02 G/100ML; G/100ML; G/100ML; G/100ML; G/100ML
85 INJECTION, SOLUTION INTRAVENOUS CONTINUOUS
Status: DISCONTINUED | OUTPATIENT
Start: 2021-10-05 | End: 2021-10-05

## 2021-10-05 RX ORDER — MAGNESIUM SULFATE HEPTAHYDRATE 40 MG/ML
2 INJECTION, SOLUTION INTRAVENOUS ONCE
Status: COMPLETED | OUTPATIENT
Start: 2021-10-05 | End: 2021-10-05

## 2021-10-05 RX ORDER — SODIUM CHLORIDE 450 MG/100ML
50 INJECTION, SOLUTION INTRAVENOUS CONTINUOUS
Status: DISCONTINUED | OUTPATIENT
Start: 2021-10-05 | End: 2021-10-06

## 2021-10-05 RX ORDER — SODIUM CHLORIDE, SODIUM LACTATE, POTASSIUM CHLORIDE, CALCIUM CHLORIDE 600; 310; 30; 20 MG/100ML; MG/100ML; MG/100ML; MG/100ML
85 INJECTION, SOLUTION INTRAVENOUS CONTINUOUS
Status: DISCONTINUED | OUTPATIENT
Start: 2021-10-05 | End: 2021-10-05

## 2021-10-05 RX ORDER — INSULIN GLARGINE 100 [IU]/ML
10 INJECTION, SOLUTION SUBCUTANEOUS
Status: DISCONTINUED | OUTPATIENT
Start: 2021-10-05 | End: 2021-10-08

## 2021-10-05 RX ADMIN — HEPARIN SODIUM 5000 UNITS: 5000 INJECTION INTRAVENOUS; SUBCUTANEOUS at 06:06

## 2021-10-05 RX ADMIN — PANTOPRAZOLE SODIUM 40 MG: 40 INJECTION, POWDER, FOR SOLUTION INTRAVENOUS at 22:45

## 2021-10-05 RX ADMIN — PANTOPRAZOLE SODIUM 40 MG: 40 INJECTION, POWDER, FOR SOLUTION INTRAVENOUS at 10:45

## 2021-10-05 RX ADMIN — MAGNESIUM SULFATE HEPTAHYDRATE 2 G: 40 INJECTION, SOLUTION INTRAVENOUS at 10:12

## 2021-10-05 RX ADMIN — DOXAZOSIN 2 MG: 2 TABLET ORAL at 10:45

## 2021-10-05 RX ADMIN — ATORVASTATIN CALCIUM 40 MG: 40 TABLET, FILM COATED ORAL at 15:30

## 2021-10-05 RX ADMIN — POTASSIUM CHLORIDE 20 MEQ: 14.9 INJECTION, SOLUTION INTRAVENOUS at 10:47

## 2021-10-05 RX ADMIN — PIPERACILLIN SODIUM, TAZOBACTAM SODIUM 3.38 G: 36; 4.5 INJECTION, POWDER, LYOPHILIZED, FOR SOLUTION INTRAVENOUS at 06:08

## 2021-10-05 RX ADMIN — HEPARIN SODIUM 5000 UNITS: 5000 INJECTION INTRAVENOUS; SUBCUTANEOUS at 15:00

## 2021-10-05 RX ADMIN — AMLODIPINE BESYLATE 10 MG: 10 TABLET ORAL at 10:45

## 2021-10-05 RX ADMIN — FINASTERIDE 5 MG: 5 TABLET, FILM COATED ORAL at 10:45

## 2021-10-05 RX ADMIN — SODIUM CHLORIDE 75 ML/HR: 0.45 INJECTION, SOLUTION INTRAVENOUS at 11:52

## 2021-10-05 RX ADMIN — POTASSIUM CHLORIDE 20 MEQ: 14.9 INJECTION, SOLUTION INTRAVENOUS at 07:49

## 2021-10-05 RX ADMIN — INSULIN LISPRO 3 UNITS: 100 INJECTION, SOLUTION INTRAVENOUS; SUBCUTANEOUS at 17:58

## 2021-10-05 RX ADMIN — POTASSIUM CHLORIDE 20 MEQ: 14.9 INJECTION, SOLUTION INTRAVENOUS at 13:04

## 2021-10-05 RX ADMIN — DEXTROSE, SODIUM CHLORIDE, SODIUM LACTATE, POTASSIUM CHLORIDE, AND CALCIUM CHLORIDE 85 ML/HR: 5; .6; .31; .03; .02 INJECTION, SOLUTION INTRAVENOUS at 07:48

## 2021-10-05 RX ADMIN — MODAFINIL 100 MG: 100 TABLET ORAL at 10:50

## 2021-10-05 RX ADMIN — INSULIN GLARGINE 10 UNITS: 100 INJECTION, SOLUTION SUBCUTANEOUS at 22:45

## 2021-10-05 RX ADMIN — LISINOPRIL 40 MG: 20 TABLET ORAL at 10:45

## 2021-10-05 RX ADMIN — HEPARIN SODIUM 5000 UNITS: 5000 INJECTION INTRAVENOUS; SUBCUTANEOUS at 22:45

## 2021-10-06 DIAGNOSIS — Z78.9 NEED FOR FOLLOW-UP BY SOCIAL WORKER: Primary | ICD-10-CM

## 2021-10-06 PROBLEM — D64.9 ANEMIA: Status: ACTIVE | Noted: 2021-10-06

## 2021-10-06 LAB
ANION GAP SERPL CALCULATED.3IONS-SCNC: 4 MMOL/L (ref 4–13)
BASOPHILS # BLD AUTO: 0.05 THOUSANDS/ΜL (ref 0–0.1)
BASOPHILS NFR BLD AUTO: 0 % (ref 0–1)
BUN SERPL-MCNC: 22 MG/DL (ref 5–25)
CALCIUM SERPL-MCNC: 8 MG/DL (ref 8.3–10.1)
CHLORIDE SERPL-SCNC: 116 MMOL/L (ref 100–108)
CO2 SERPL-SCNC: 27 MMOL/L (ref 21–32)
CREAT SERPL-MCNC: 0.58 MG/DL (ref 0.6–1.3)
DME PARACHUTE DELIVERY DATE REQUESTED: NORMAL
DME PARACHUTE DELIVERY DATE REQUESTED: NORMAL
DME PARACHUTE ITEM DESCRIPTION: NORMAL
DME PARACHUTE ORDER STATUS: NORMAL
DME PARACHUTE ORDER STATUS: NORMAL
DME PARACHUTE SUPPLIER NAME: NORMAL
DME PARACHUTE SUPPLIER NAME: NORMAL
DME PARACHUTE SUPPLIER PHONE: NORMAL
DME PARACHUTE SUPPLIER PHONE: NORMAL
EOSINOPHIL # BLD AUTO: 0.17 THOUSAND/ΜL (ref 0–0.61)
EOSINOPHIL NFR BLD AUTO: 1 % (ref 0–6)
ERYTHROCYTE [DISTWIDTH] IN BLOOD BY AUTOMATED COUNT: 14.9 % (ref 11.6–15.1)
GFR SERPL CREATININE-BSD FRML MDRD: 94 ML/MIN/1.73SQ M
GLUCOSE SERPL-MCNC: 170 MG/DL (ref 65–140)
GLUCOSE SERPL-MCNC: 193 MG/DL (ref 65–140)
GLUCOSE SERPL-MCNC: 204 MG/DL (ref 65–140)
GLUCOSE SERPL-MCNC: 206 MG/DL (ref 65–140)
GLUCOSE SERPL-MCNC: 259 MG/DL (ref 65–140)
GLUCOSE SERPL-MCNC: 279 MG/DL (ref 65–140)
HCT VFR BLD AUTO: 22.6 % (ref 36.5–49.3)
HCT VFR BLD AUTO: 25 % (ref 36.5–49.3)
HGB BLD-MCNC: 6.9 G/DL (ref 12–17)
HGB BLD-MCNC: 7.8 G/DL (ref 12–17)
IMM GRANULOCYTES # BLD AUTO: 0.34 THOUSAND/UL (ref 0–0.2)
IMM GRANULOCYTES NFR BLD AUTO: 2 % (ref 0–2)
LYMPHOCYTES # BLD AUTO: 1.71 THOUSANDS/ΜL (ref 0.6–4.47)
LYMPHOCYTES NFR BLD AUTO: 10 % (ref 14–44)
MCH RBC QN AUTO: 31.7 PG (ref 26.8–34.3)
MCHC RBC AUTO-ENTMCNC: 30.5 G/DL (ref 31.4–37.4)
MCV RBC AUTO: 104 FL (ref 82–98)
MONOCYTES # BLD AUTO: 0.79 THOUSAND/ΜL (ref 0.17–1.22)
MONOCYTES NFR BLD AUTO: 5 % (ref 4–12)
NEUTROPHILS # BLD AUTO: 13.4 THOUSANDS/ΜL (ref 1.85–7.62)
NEUTS SEG NFR BLD AUTO: 82 % (ref 43–75)
NRBC BLD AUTO-RTO: 0 /100 WBCS
PLATELET # BLD AUTO: 361 THOUSANDS/UL (ref 149–390)
PMV BLD AUTO: 10.6 FL (ref 8.9–12.7)
POTASSIUM SERPL-SCNC: 3.6 MMOL/L (ref 3.5–5.3)
RBC # BLD AUTO: 2.18 MILLION/UL (ref 3.88–5.62)
SODIUM SERPL-SCNC: 147 MMOL/L (ref 136–145)
WBC # BLD AUTO: 16.46 THOUSAND/UL (ref 4.31–10.16)

## 2021-10-06 PROCEDURE — 85014 HEMATOCRIT: CPT | Performed by: INTERNAL MEDICINE

## 2021-10-06 PROCEDURE — 82948 REAGENT STRIP/BLOOD GLUCOSE: CPT

## 2021-10-06 PROCEDURE — 99233 SBSQ HOSP IP/OBS HIGH 50: CPT | Performed by: INTERNAL MEDICINE

## 2021-10-06 PROCEDURE — C9113 INJ PANTOPRAZOLE SODIUM, VIA: HCPCS | Performed by: STUDENT IN AN ORGANIZED HEALTH CARE EDUCATION/TRAINING PROGRAM

## 2021-10-06 PROCEDURE — 85018 HEMOGLOBIN: CPT | Performed by: INTERNAL MEDICINE

## 2021-10-06 PROCEDURE — 85025 COMPLETE CBC W/AUTO DIFF WBC: CPT | Performed by: STUDENT IN AN ORGANIZED HEALTH CARE EDUCATION/TRAINING PROGRAM

## 2021-10-06 PROCEDURE — 80048 BASIC METABOLIC PNL TOTAL CA: CPT | Performed by: STUDENT IN AN ORGANIZED HEALTH CARE EDUCATION/TRAINING PROGRAM

## 2021-10-06 RX ORDER — FOLIC ACID 1 MG/1
1 TABLET ORAL DAILY
Status: DISCONTINUED | OUTPATIENT
Start: 2021-10-06 | End: 2021-10-12 | Stop reason: HOSPADM

## 2021-10-06 RX ORDER — FERROUS SULFATE 325(65) MG
325 TABLET ORAL
Status: DISCONTINUED | OUTPATIENT
Start: 2021-10-07 | End: 2021-10-10

## 2021-10-06 RX ADMIN — HEPARIN SODIUM 5000 UNITS: 5000 INJECTION INTRAVENOUS; SUBCUTANEOUS at 15:00

## 2021-10-06 RX ADMIN — INSULIN LISPRO 1 UNITS: 100 INJECTION, SOLUTION INTRAVENOUS; SUBCUTANEOUS at 12:13

## 2021-10-06 RX ADMIN — PANTOPRAZOLE SODIUM 40 MG: 40 INJECTION, POWDER, FOR SOLUTION INTRAVENOUS at 08:11

## 2021-10-06 RX ADMIN — FOLIC ACID 1 MG: 1 TABLET ORAL at 12:13

## 2021-10-06 RX ADMIN — PANTOPRAZOLE SODIUM 40 MG: 40 INJECTION, POWDER, FOR SOLUTION INTRAVENOUS at 20:06

## 2021-10-06 RX ADMIN — HEPARIN SODIUM 5000 UNITS: 5000 INJECTION INTRAVENOUS; SUBCUTANEOUS at 05:47

## 2021-10-06 RX ADMIN — LISINOPRIL 40 MG: 20 TABLET ORAL at 08:11

## 2021-10-06 RX ADMIN — DOXAZOSIN 2 MG: 2 TABLET ORAL at 08:11

## 2021-10-06 RX ADMIN — ATORVASTATIN CALCIUM 40 MG: 40 TABLET, FILM COATED ORAL at 17:30

## 2021-10-06 RX ADMIN — INSULIN LISPRO 2 UNITS: 100 INJECTION, SOLUTION INTRAVENOUS; SUBCUTANEOUS at 05:44

## 2021-10-06 RX ADMIN — AMLODIPINE BESYLATE 10 MG: 10 TABLET ORAL at 08:11

## 2021-10-06 RX ADMIN — MODAFINIL 100 MG: 100 TABLET ORAL at 08:16

## 2021-10-06 RX ADMIN — INSULIN GLARGINE 10 UNITS: 100 INJECTION, SOLUTION SUBCUTANEOUS at 23:01

## 2021-10-06 RX ADMIN — INSULIN LISPRO 3 UNITS: 100 INJECTION, SOLUTION INTRAVENOUS; SUBCUTANEOUS at 17:30

## 2021-10-06 RX ADMIN — FINASTERIDE 5 MG: 5 TABLET, FILM COATED ORAL at 08:11

## 2021-10-06 RX ADMIN — HEPARIN SODIUM 5000 UNITS: 5000 INJECTION INTRAVENOUS; SUBCUTANEOUS at 23:01

## 2021-10-06 RX ADMIN — ONDANSETRON 4 MG: 2 INJECTION INTRAMUSCULAR; INTRAVENOUS at 12:17

## 2021-10-06 RX ADMIN — INSULIN LISPRO 4 UNITS: 100 INJECTION, SOLUTION INTRAVENOUS; SUBCUTANEOUS at 00:16

## 2021-10-06 RX ADMIN — ACETAMINOPHEN 650 MG: 650 SUPPOSITORY RECTAL at 12:17

## 2021-10-06 NOTE — TELEPHONE ENCOUNTER
10/14/2021- 10/19/2021 APT CX-Patient (PER KYLEE BELL/DAUGHTER CX APPT PT GOING INTO COMFORT CARE [facility] IF NEEDED)    10/12/2021-CALLED [facility] Fuad AT PHONE#[phone], SPOKE TO STEPHANIE, WHO REQUESTED TO CHANGE APT TO VIRTUAL BECAUSE PT HAS HARD TIME TRAVELING  CONFIRMED 10/19/2021 VIRTUAL APT W/STEPHANIE AT FACILITY AND SHE WILL SCHEDULE CT HEAD PRIOR      WAITING FOR CT HEAD TO BE SCHEDULED           10/11/2021-PT [facility]    10/07/2021-PT [facility]    10/06/2021-PT [address]  10/19/2021 APT W/CT HEAD      10/05/2021-PT STILL IN HOSPITAL,  PER Amber Bach PA-C   2 wk PA - CT head wo

## 2021-10-07 ENCOUNTER — APPOINTMENT (INPATIENT)
Dept: RADIOLOGY | Facility: HOSPITAL | Age: 81
DRG: 064 | End: 2021-10-07
Payer: COMMERCIAL

## 2021-10-07 ENCOUNTER — PATIENT OUTREACH (OUTPATIENT)
Dept: FAMILY MEDICINE CLINIC | Facility: CLINIC | Age: 81
End: 2021-10-07

## 2021-10-07 LAB
ALBUMIN SERPL BCP-MCNC: 1.6 G/DL (ref 3.5–5)
ALP SERPL-CCNC: 152 U/L (ref 46–116)
ALT SERPL W P-5'-P-CCNC: 214 U/L (ref 12–78)
ANION GAP SERPL CALCULATED.3IONS-SCNC: 1 MMOL/L (ref 4–13)
ANISOCYTOSIS BLD QL SMEAR: PRESENT
ARTIFACT: PRESENT
AST SERPL W P-5'-P-CCNC: 208 U/L (ref 5–45)
BACTERIA UR CULT: ABNORMAL
BACTERIA UR CULT: ABNORMAL
BASOPHILS # BLD MANUAL: 0.13 THOUSAND/UL (ref 0–0.1)
BASOPHILS NFR MAR MANUAL: 1 % (ref 0–1)
BILIRUB SERPL-MCNC: 0.45 MG/DL (ref 0.2–1)
BUN SERPL-MCNC: 19 MG/DL (ref 5–25)
CALCIUM ALBUM COR SERPL-MCNC: 10.4 MG/DL (ref 8.3–10.1)
CALCIUM SERPL-MCNC: 8.5 MG/DL (ref 8.3–10.1)
CHLORIDE SERPL-SCNC: 118 MMOL/L (ref 100–108)
CO2 SERPL-SCNC: 28 MMOL/L (ref 21–32)
CREAT SERPL-MCNC: 0.64 MG/DL (ref 0.6–1.3)
EOSINOPHIL # BLD MANUAL: 0.27 THOUSAND/UL (ref 0–0.4)
EOSINOPHIL NFR BLD MANUAL: 2 % (ref 0–6)
ERYTHROCYTE [DISTWIDTH] IN BLOOD BY AUTOMATED COUNT: 14.9 % (ref 11.6–15.1)
GFR SERPL CREATININE-BSD FRML MDRD: 91 ML/MIN/1.73SQ M
GLUCOSE SERPL-MCNC: 135 MG/DL (ref 65–140)
GLUCOSE SERPL-MCNC: 172 MG/DL (ref 65–140)
GLUCOSE SERPL-MCNC: 188 MG/DL (ref 65–140)
GLUCOSE SERPL-MCNC: 192 MG/DL (ref 65–140)
GLUCOSE SERPL-MCNC: 208 MG/DL (ref 65–140)
GLUCOSE SERPL-MCNC: 215 MG/DL (ref 65–140)
HCT VFR BLD AUTO: 26.1 % (ref 36.5–49.3)
HGB BLD-MCNC: 7.9 G/DL (ref 12–17)
HYPERCHROMIA BLD QL SMEAR: PRESENT
LYMPHOCYTES # BLD AUTO: 0.54 THOUSAND/UL (ref 0.6–4.47)
LYMPHOCYTES # BLD AUTO: 4 % (ref 14–44)
MAGNESIUM SERPL-MCNC: 2.7 MG/DL (ref 1.6–2.6)
MCH RBC QN AUTO: 31.3 PG (ref 26.8–34.3)
MCHC RBC AUTO-ENTMCNC: 30.3 G/DL (ref 31.4–37.4)
MCV RBC AUTO: 104 FL (ref 82–98)
METAMYELOCYTES NFR BLD MANUAL: 1 % (ref 0–1)
MONOCYTES # BLD AUTO: 0.4 THOUSAND/UL (ref 0–1.22)
MONOCYTES NFR BLD: 3 % (ref 4–12)
NEUTROPHILS # BLD MANUAL: 11.33 THOUSAND/UL (ref 1.85–7.62)
NEUTS BAND NFR BLD MANUAL: 5 % (ref 0–8)
NEUTS SEG NFR BLD AUTO: 79 % (ref 43–75)
PLATELET # BLD AUTO: 346 THOUSANDS/UL (ref 149–390)
PLATELET BLD QL SMEAR: ADEQUATE
PMV BLD AUTO: 10.4 FL (ref 8.9–12.7)
POLYCHROMASIA BLD QL SMEAR: PRESENT
POTASSIUM SERPL-SCNC: 4.4 MMOL/L (ref 3.5–5.3)
PROT SERPL-MCNC: 6.4 G/DL (ref 6.4–8.2)
RBC # BLD AUTO: 2.52 MILLION/UL (ref 3.88–5.62)
RBC MORPH BLD: PRESENT
SODIUM SERPL-SCNC: 147 MMOL/L (ref 136–145)
VARIANT LYMPHS # BLD AUTO: 5 %
WBC # BLD AUTO: 13.49 THOUSAND/UL (ref 4.31–10.16)

## 2021-10-07 PROCEDURE — 83735 ASSAY OF MAGNESIUM: CPT | Performed by: INTERNAL MEDICINE

## 2021-10-07 PROCEDURE — 82948 REAGENT STRIP/BLOOD GLUCOSE: CPT

## 2021-10-07 PROCEDURE — 99233 SBSQ HOSP IP/OBS HIGH 50: CPT | Performed by: INTERNAL MEDICINE

## 2021-10-07 PROCEDURE — 80053 COMPREHEN METABOLIC PANEL: CPT | Performed by: INTERNAL MEDICINE

## 2021-10-07 PROCEDURE — 85007 BL SMEAR W/DIFF WBC COUNT: CPT | Performed by: INTERNAL MEDICINE

## 2021-10-07 PROCEDURE — 71045 X-RAY EXAM CHEST 1 VIEW: CPT

## 2021-10-07 PROCEDURE — 85027 COMPLETE CBC AUTOMATED: CPT | Performed by: INTERNAL MEDICINE

## 2021-10-07 PROCEDURE — C9113 INJ PANTOPRAZOLE SODIUM, VIA: HCPCS | Performed by: STUDENT IN AN ORGANIZED HEALTH CARE EDUCATION/TRAINING PROGRAM

## 2021-10-07 RX ORDER — SODIUM CHLORIDE 450 MG/100ML
75 INJECTION, SOLUTION INTRAVENOUS CONTINUOUS
Status: DISCONTINUED | OUTPATIENT
Start: 2021-10-07 | End: 2021-10-07

## 2021-10-07 RX ADMIN — INSULIN LISPRO 2 UNITS: 100 INJECTION, SOLUTION INTRAVENOUS; SUBCUTANEOUS at 23:37

## 2021-10-07 RX ADMIN — HEPARIN SODIUM 5000 UNITS: 5000 INJECTION INTRAVENOUS; SUBCUTANEOUS at 14:31

## 2021-10-07 RX ADMIN — AMLODIPINE BESYLATE 10 MG: 10 TABLET ORAL at 10:07

## 2021-10-07 RX ADMIN — INSULIN LISPRO 1 UNITS: 100 INJECTION, SOLUTION INTRAVENOUS; SUBCUTANEOUS at 17:44

## 2021-10-07 RX ADMIN — SODIUM CHLORIDE 75 ML/HR: 0.45 INJECTION, SOLUTION INTRAVENOUS at 10:11

## 2021-10-07 RX ADMIN — FERROUS SULFATE TAB 325 MG (65 MG ELEMENTAL FE) 325 MG: 325 (65 FE) TAB at 10:06

## 2021-10-07 RX ADMIN — FINASTERIDE 5 MG: 5 TABLET, FILM COATED ORAL at 10:07

## 2021-10-07 RX ADMIN — INSULIN LISPRO 2 UNITS: 100 INJECTION, SOLUTION INTRAVENOUS; SUBCUTANEOUS at 00:39

## 2021-10-07 RX ADMIN — DOXAZOSIN 2 MG: 2 TABLET ORAL at 10:07

## 2021-10-07 RX ADMIN — PANTOPRAZOLE SODIUM 40 MG: 40 INJECTION, POWDER, FOR SOLUTION INTRAVENOUS at 10:06

## 2021-10-07 RX ADMIN — FOLIC ACID 1 MG: 1 TABLET ORAL at 10:07

## 2021-10-07 RX ADMIN — PANTOPRAZOLE SODIUM 40 MG: 40 INJECTION, POWDER, FOR SOLUTION INTRAVENOUS at 21:33

## 2021-10-07 RX ADMIN — HEPARIN SODIUM 5000 UNITS: 5000 INJECTION INTRAVENOUS; SUBCUTANEOUS at 05:29

## 2021-10-07 RX ADMIN — INSULIN LISPRO 2 UNITS: 100 INJECTION, SOLUTION INTRAVENOUS; SUBCUTANEOUS at 05:29

## 2021-10-07 RX ADMIN — INSULIN GLARGINE 10 UNITS: 100 INJECTION, SOLUTION SUBCUTANEOUS at 21:33

## 2021-10-07 RX ADMIN — ACETAMINOPHEN 650 MG: 650 SUPPOSITORY RECTAL at 23:37

## 2021-10-07 RX ADMIN — LISINOPRIL 40 MG: 20 TABLET ORAL at 10:07

## 2021-10-07 RX ADMIN — ACETAMINOPHEN 650 MG: 650 SUPPOSITORY RECTAL at 09:56

## 2021-10-07 RX ADMIN — HEPARIN SODIUM 5000 UNITS: 5000 INJECTION INTRAVENOUS; SUBCUTANEOUS at 21:33

## 2021-10-08 LAB
ALBUMIN SERPL BCP-MCNC: 1.6 G/DL (ref 3.5–5)
ALP SERPL-CCNC: 141 U/L (ref 46–116)
ALT SERPL W P-5'-P-CCNC: 139 U/L (ref 12–78)
ANION GAP SERPL CALCULATED.3IONS-SCNC: 1 MMOL/L (ref 4–13)
AST SERPL W P-5'-P-CCNC: 69 U/L (ref 5–45)
BACTERIA BLD CULT: NORMAL
BACTERIA BLD CULT: NORMAL
BILIRUB SERPL-MCNC: 0.34 MG/DL (ref 0.2–1)
BUN SERPL-MCNC: 16 MG/DL (ref 5–25)
CALCIUM ALBUM COR SERPL-MCNC: 10.3 MG/DL (ref 8.3–10.1)
CALCIUM SERPL-MCNC: 8.4 MG/DL (ref 8.3–10.1)
CHLORIDE SERPL-SCNC: 113 MMOL/L (ref 100–108)
CO2 SERPL-SCNC: 29 MMOL/L (ref 21–32)
CREAT SERPL-MCNC: 0.58 MG/DL (ref 0.6–1.3)
ERYTHROCYTE [DISTWIDTH] IN BLOOD BY AUTOMATED COUNT: 14.8 % (ref 11.6–15.1)
GFR SERPL CREATININE-BSD FRML MDRD: 94 ML/MIN/1.73SQ M
GLUCOSE SERPL-MCNC: 167 MG/DL (ref 65–140)
GLUCOSE SERPL-MCNC: 175 MG/DL (ref 65–140)
GLUCOSE SERPL-MCNC: 193 MG/DL (ref 65–140)
GLUCOSE SERPL-MCNC: 207 MG/DL (ref 65–140)
HCT VFR BLD AUTO: 24.9 % (ref 36.5–49.3)
HGB BLD-MCNC: 7.7 G/DL (ref 12–17)
MCH RBC QN AUTO: 31.7 PG (ref 26.8–34.3)
MCHC RBC AUTO-ENTMCNC: 30.9 G/DL (ref 31.4–37.4)
MCV RBC AUTO: 103 FL (ref 82–98)
PLATELET # BLD AUTO: 315 THOUSANDS/UL (ref 149–390)
PMV BLD AUTO: 10.1 FL (ref 8.9–12.7)
POTASSIUM SERPL-SCNC: 4.4 MMOL/L (ref 3.5–5.3)
PROT SERPL-MCNC: 6.2 G/DL (ref 6.4–8.2)
RBC # BLD AUTO: 2.43 MILLION/UL (ref 3.88–5.62)
SARS-COV-2 RNA RESP QL NAA+PROBE: NEGATIVE
SODIUM SERPL-SCNC: 143 MMOL/L (ref 136–145)
WBC # BLD AUTO: 14.36 THOUSAND/UL (ref 4.31–10.16)

## 2021-10-08 PROCEDURE — C9113 INJ PANTOPRAZOLE SODIUM, VIA: HCPCS | Performed by: STUDENT IN AN ORGANIZED HEALTH CARE EDUCATION/TRAINING PROGRAM

## 2021-10-08 PROCEDURE — U0003 INFECTIOUS AGENT DETECTION BY NUCLEIC ACID (DNA OR RNA); SEVERE ACUTE RESPIRATORY SYNDROME CORONAVIRUS 2 (SARS-COV-2) (CORONAVIRUS DISEASE [COVID-19]), AMPLIFIED PROBE TECHNIQUE, MAKING USE OF HIGH THROUGHPUT TECHNOLOGIES AS DESCRIBED BY CMS-2020-01-R: HCPCS | Performed by: STUDENT IN AN ORGANIZED HEALTH CARE EDUCATION/TRAINING PROGRAM

## 2021-10-08 PROCEDURE — U0005 INFEC AGEN DETEC AMPLI PROBE: HCPCS | Performed by: STUDENT IN AN ORGANIZED HEALTH CARE EDUCATION/TRAINING PROGRAM

## 2021-10-08 PROCEDURE — 97110 THERAPEUTIC EXERCISES: CPT

## 2021-10-08 PROCEDURE — 97530 THERAPEUTIC ACTIVITIES: CPT

## 2021-10-08 PROCEDURE — 99233 SBSQ HOSP IP/OBS HIGH 50: CPT | Performed by: INTERNAL MEDICINE

## 2021-10-08 PROCEDURE — 80053 COMPREHEN METABOLIC PANEL: CPT | Performed by: INTERNAL MEDICINE

## 2021-10-08 PROCEDURE — 97535 SELF CARE MNGMENT TRAINING: CPT

## 2021-10-08 PROCEDURE — 97112 NEUROMUSCULAR REEDUCATION: CPT

## 2021-10-08 PROCEDURE — 85027 COMPLETE CBC AUTOMATED: CPT | Performed by: INTERNAL MEDICINE

## 2021-10-08 PROCEDURE — 82948 REAGENT STRIP/BLOOD GLUCOSE: CPT

## 2021-10-08 RX ORDER — INSULIN GLARGINE 100 [IU]/ML
15 INJECTION, SOLUTION SUBCUTANEOUS
Status: DISCONTINUED | OUTPATIENT
Start: 2021-10-08 | End: 2021-10-12

## 2021-10-08 RX ADMIN — HEPARIN SODIUM 5000 UNITS: 5000 INJECTION INTRAVENOUS; SUBCUTANEOUS at 06:01

## 2021-10-08 RX ADMIN — INSULIN LISPRO 2 UNITS: 100 INJECTION, SOLUTION INTRAVENOUS; SUBCUTANEOUS at 06:04

## 2021-10-08 RX ADMIN — INSULIN LISPRO 2 UNITS: 100 INJECTION, SOLUTION INTRAVENOUS; SUBCUTANEOUS at 17:40

## 2021-10-08 RX ADMIN — FOLIC ACID 1 MG: 1 TABLET ORAL at 08:37

## 2021-10-08 RX ADMIN — HEPARIN SODIUM 5000 UNITS: 5000 INJECTION INTRAVENOUS; SUBCUTANEOUS at 13:43

## 2021-10-08 RX ADMIN — FERROUS SULFATE TAB 325 MG (65 MG ELEMENTAL FE) 325 MG: 325 (65 FE) TAB at 08:37

## 2021-10-08 RX ADMIN — AMLODIPINE BESYLATE 10 MG: 10 TABLET ORAL at 08:37

## 2021-10-08 RX ADMIN — DOXAZOSIN 2 MG: 2 TABLET ORAL at 08:37

## 2021-10-08 RX ADMIN — LISINOPRIL 40 MG: 20 TABLET ORAL at 08:37

## 2021-10-08 RX ADMIN — ACETAMINOPHEN 650 MG: 650 SUPPOSITORY RECTAL at 16:46

## 2021-10-08 RX ADMIN — FINASTERIDE 5 MG: 5 TABLET, FILM COATED ORAL at 08:37

## 2021-10-08 RX ADMIN — PANTOPRAZOLE SODIUM 40 MG: 40 INJECTION, POWDER, FOR SOLUTION INTRAVENOUS at 08:38

## 2021-10-08 RX ADMIN — INSULIN LISPRO 1 UNITS: 100 INJECTION, SOLUTION INTRAVENOUS; SUBCUTANEOUS at 11:13

## 2021-10-09 LAB
ALBUMIN SERPL BCP-MCNC: 1.4 G/DL (ref 3.5–5)
ALP SERPL-CCNC: 139 U/L (ref 46–116)
ALT SERPL W P-5'-P-CCNC: 115 U/L (ref 12–78)
ANION GAP SERPL CALCULATED.3IONS-SCNC: 1 MMOL/L (ref 4–13)
AST SERPL W P-5'-P-CCNC: 65 U/L (ref 5–45)
BILIRUB SERPL-MCNC: 0.26 MG/DL (ref 0.2–1)
BUN SERPL-MCNC: 18 MG/DL (ref 5–25)
CALCIUM ALBUM COR SERPL-MCNC: 10.6 MG/DL (ref 8.3–10.1)
CALCIUM SERPL-MCNC: 8.5 MG/DL (ref 8.3–10.1)
CHLORIDE SERPL-SCNC: 113 MMOL/L (ref 100–108)
CO2 SERPL-SCNC: 30 MMOL/L (ref 21–32)
CREAT SERPL-MCNC: 0.56 MG/DL (ref 0.6–1.3)
ERYTHROCYTE [DISTWIDTH] IN BLOOD BY AUTOMATED COUNT: 14.9 % (ref 11.6–15.1)
GFR SERPL CREATININE-BSD FRML MDRD: 96 ML/MIN/1.73SQ M
GLUCOSE SERPL-MCNC: 156 MG/DL (ref 65–140)
GLUCOSE SERPL-MCNC: 161 MG/DL (ref 65–140)
GLUCOSE SERPL-MCNC: 184 MG/DL (ref 65–140)
GLUCOSE SERPL-MCNC: 190 MG/DL (ref 65–140)
GLUCOSE SERPL-MCNC: 202 MG/DL (ref 65–140)
GLUCOSE SERPL-MCNC: 225 MG/DL (ref 65–140)
GLUCOSE SERPL-MCNC: 259 MG/DL (ref 65–140)
HCT VFR BLD AUTO: 27.1 % (ref 36.5–49.3)
HGB BLD-MCNC: 8.3 G/DL (ref 12–17)
MCH RBC QN AUTO: 31.6 PG (ref 26.8–34.3)
MCHC RBC AUTO-ENTMCNC: 30.6 G/DL (ref 31.4–37.4)
MCV RBC AUTO: 103 FL (ref 82–98)
PLATELET # BLD AUTO: 282 THOUSANDS/UL (ref 149–390)
PMV BLD AUTO: 10.8 FL (ref 8.9–12.7)
POTASSIUM SERPL-SCNC: 4.3 MMOL/L (ref 3.5–5.3)
PROCALCITONIN SERPL-MCNC: 0.31 NG/ML
PROT SERPL-MCNC: 5.9 G/DL (ref 6.4–8.2)
RBC # BLD AUTO: 2.63 MILLION/UL (ref 3.88–5.62)
SODIUM SERPL-SCNC: 144 MMOL/L (ref 136–145)
WBC # BLD AUTO: 12.31 THOUSAND/UL (ref 4.31–10.16)

## 2021-10-09 PROCEDURE — 99233 SBSQ HOSP IP/OBS HIGH 50: CPT | Performed by: STUDENT IN AN ORGANIZED HEALTH CARE EDUCATION/TRAINING PROGRAM

## 2021-10-09 PROCEDURE — 84145 PROCALCITONIN (PCT): CPT | Performed by: INTERNAL MEDICINE

## 2021-10-09 PROCEDURE — 82948 REAGENT STRIP/BLOOD GLUCOSE: CPT

## 2021-10-09 PROCEDURE — 85027 COMPLETE CBC AUTOMATED: CPT | Performed by: INTERNAL MEDICINE

## 2021-10-09 PROCEDURE — 80053 COMPREHEN METABOLIC PANEL: CPT | Performed by: INTERNAL MEDICINE

## 2021-10-09 PROCEDURE — C9113 INJ PANTOPRAZOLE SODIUM, VIA: HCPCS | Performed by: STUDENT IN AN ORGANIZED HEALTH CARE EDUCATION/TRAINING PROGRAM

## 2021-10-09 RX ORDER — DOXAZOSIN 2 MG/1
2 TABLET ORAL DAILY
Status: DISCONTINUED | OUTPATIENT
Start: 2021-10-10 | End: 2021-10-12 | Stop reason: HOSPADM

## 2021-10-09 RX ORDER — LISINOPRIL 20 MG/1
40 TABLET ORAL DAILY
Status: DISCONTINUED | OUTPATIENT
Start: 2021-10-10 | End: 2021-10-12

## 2021-10-09 RX ORDER — AMLODIPINE BESYLATE 10 MG/1
10 TABLET ORAL DAILY
Status: DISCONTINUED | OUTPATIENT
Start: 2021-10-10 | End: 2021-10-12

## 2021-10-09 RX ORDER — ACETAMINOPHEN 325 MG/1
650 TABLET ORAL EVERY 6 HOURS PRN
Status: DISCONTINUED | OUTPATIENT
Start: 2021-10-09 | End: 2021-10-12 | Stop reason: HOSPADM

## 2021-10-09 RX ADMIN — DOXAZOSIN 2 MG: 2 TABLET ORAL at 09:24

## 2021-10-09 RX ADMIN — INSULIN LISPRO 3 UNITS: 100 INJECTION, SOLUTION INTRAVENOUS; SUBCUTANEOUS at 00:04

## 2021-10-09 RX ADMIN — AMLODIPINE BESYLATE 10 MG: 10 TABLET ORAL at 09:24

## 2021-10-09 RX ADMIN — INSULIN LISPRO 2 UNITS: 100 INJECTION, SOLUTION INTRAVENOUS; SUBCUTANEOUS at 18:07

## 2021-10-09 RX ADMIN — HEPARIN SODIUM 5000 UNITS: 5000 INJECTION INTRAVENOUS; SUBCUTANEOUS at 06:38

## 2021-10-09 RX ADMIN — HEPARIN SODIUM 5000 UNITS: 5000 INJECTION INTRAVENOUS; SUBCUTANEOUS at 00:03

## 2021-10-09 RX ADMIN — FOLIC ACID 1 MG: 1 TABLET ORAL at 09:24

## 2021-10-09 RX ADMIN — HEPARIN SODIUM 5000 UNITS: 5000 INJECTION INTRAVENOUS; SUBCUTANEOUS at 13:07

## 2021-10-09 RX ADMIN — FERROUS SULFATE TAB 325 MG (65 MG ELEMENTAL FE) 325 MG: 325 (65 FE) TAB at 06:38

## 2021-10-09 RX ADMIN — ACETAMINOPHEN 650 MG: 650 SUPPOSITORY RECTAL at 01:31

## 2021-10-09 RX ADMIN — HEPARIN SODIUM 5000 UNITS: 5000 INJECTION INTRAVENOUS; SUBCUTANEOUS at 21:54

## 2021-10-09 RX ADMIN — FINASTERIDE 5 MG: 5 TABLET, FILM COATED ORAL at 09:25

## 2021-10-09 RX ADMIN — INSULIN LISPRO 1 UNITS: 100 INJECTION, SOLUTION INTRAVENOUS; SUBCUTANEOUS at 06:38

## 2021-10-09 RX ADMIN — ACETAMINOPHEN 650 MG: 650 SUPPOSITORY RECTAL at 13:07

## 2021-10-09 RX ADMIN — LISINOPRIL 40 MG: 20 TABLET ORAL at 09:25

## 2021-10-09 RX ADMIN — INSULIN LISPRO 1 UNITS: 100 INJECTION, SOLUTION INTRAVENOUS; SUBCUTANEOUS at 11:11

## 2021-10-09 RX ADMIN — PANTOPRAZOLE SODIUM 40 MG: 40 INJECTION, POWDER, FOR SOLUTION INTRAVENOUS at 09:24

## 2021-10-09 RX ADMIN — INSULIN GLARGINE 15 UNITS: 100 INJECTION, SOLUTION SUBCUTANEOUS at 22:01

## 2021-10-09 RX ADMIN — INSULIN GLARGINE 15 UNITS: 100 INJECTION, SOLUTION SUBCUTANEOUS at 00:03

## 2021-10-09 RX ADMIN — PANTOPRAZOLE SODIUM 40 MG: 40 INJECTION, POWDER, FOR SOLUTION INTRAVENOUS at 00:03

## 2021-10-10 LAB
GLUCOSE SERPL-MCNC: 117 MG/DL (ref 65–140)
GLUCOSE SERPL-MCNC: 161 MG/DL (ref 65–140)
GLUCOSE SERPL-MCNC: 174 MG/DL (ref 65–140)
GLUCOSE SERPL-MCNC: 235 MG/DL (ref 65–140)
GLUCOSE SERPL-MCNC: 238 MG/DL (ref 65–140)
PROCALCITONIN SERPL-MCNC: 0.24 NG/ML

## 2021-10-10 PROCEDURE — 84145 PROCALCITONIN (PCT): CPT | Performed by: INTERNAL MEDICINE

## 2021-10-10 PROCEDURE — 82948 REAGENT STRIP/BLOOD GLUCOSE: CPT

## 2021-10-10 PROCEDURE — 99233 SBSQ HOSP IP/OBS HIGH 50: CPT | Performed by: INTERNAL MEDICINE

## 2021-10-10 RX ORDER — FERROUS SULFATE 300 MG/5ML
300 LIQUID (ML) ORAL
Status: DISCONTINUED | OUTPATIENT
Start: 2021-10-11 | End: 2021-10-12 | Stop reason: HOSPADM

## 2021-10-10 RX ADMIN — ACETAMINOPHEN 650 MG: 325 TABLET, FILM COATED ORAL at 00:18

## 2021-10-10 RX ADMIN — HEPARIN SODIUM 5000 UNITS: 5000 INJECTION INTRAVENOUS; SUBCUTANEOUS at 06:48

## 2021-10-10 RX ADMIN — LISINOPRIL 40 MG: 20 TABLET ORAL at 08:50

## 2021-10-10 RX ADMIN — FOLIC ACID 1 MG: 1 TABLET ORAL at 08:50

## 2021-10-10 RX ADMIN — AMLODIPINE BESYLATE 10 MG: 10 TABLET ORAL at 08:50

## 2021-10-10 RX ADMIN — HEPARIN SODIUM 5000 UNITS: 5000 INJECTION INTRAVENOUS; SUBCUTANEOUS at 21:52

## 2021-10-10 RX ADMIN — Medication 20 MG: at 08:53

## 2021-10-10 RX ADMIN — INSULIN LISPRO 3 UNITS: 100 INJECTION, SOLUTION INTRAVENOUS; SUBCUTANEOUS at 18:13

## 2021-10-10 RX ADMIN — FERROUS SULFATE TAB 325 MG (65 MG ELEMENTAL FE) 325 MG: 325 (65 FE) TAB at 06:48

## 2021-10-10 RX ADMIN — FINASTERIDE 5 MG: 5 TABLET, FILM COATED ORAL at 08:50

## 2021-10-10 RX ADMIN — INSULIN LISPRO 1 UNITS: 100 INJECTION, SOLUTION INTRAVENOUS; SUBCUTANEOUS at 06:48

## 2021-10-10 RX ADMIN — DOXAZOSIN 2 MG: 2 TABLET ORAL at 08:50

## 2021-10-10 RX ADMIN — INSULIN LISPRO 2 UNITS: 100 INJECTION, SOLUTION INTRAVENOUS; SUBCUTANEOUS at 00:08

## 2021-10-10 RX ADMIN — INSULIN GLARGINE 15 UNITS: 100 INJECTION, SOLUTION SUBCUTANEOUS at 21:58

## 2021-10-10 RX ADMIN — HEPARIN SODIUM 5000 UNITS: 5000 INJECTION INTRAVENOUS; SUBCUTANEOUS at 13:03

## 2021-10-11 LAB
ALBUMIN SERPL BCP-MCNC: 1.5 G/DL (ref 3.5–5)
ALP SERPL-CCNC: 163 U/L (ref 46–116)
ALT SERPL W P-5'-P-CCNC: 107 U/L (ref 12–78)
ANION GAP SERPL CALCULATED.3IONS-SCNC: 4 MMOL/L (ref 4–13)
ARTIFACT: PRESENT
AST SERPL W P-5'-P-CCNC: 57 U/L (ref 5–45)
BASOPHILS # BLD MANUAL: 0 THOUSAND/UL (ref 0–0.1)
BASOPHILS NFR MAR MANUAL: 0 % (ref 0–1)
BILIRUB SERPL-MCNC: 0.29 MG/DL (ref 0.2–1)
BUN SERPL-MCNC: 20 MG/DL (ref 5–25)
CALCIUM ALBUM COR SERPL-MCNC: 10.5 MG/DL (ref 8.3–10.1)
CALCIUM SERPL-MCNC: 8.5 MG/DL (ref 8.3–10.1)
CHLORIDE SERPL-SCNC: 109 MMOL/L (ref 100–108)
CO2 SERPL-SCNC: 28 MMOL/L (ref 21–32)
CREAT SERPL-MCNC: 0.54 MG/DL (ref 0.6–1.3)
EOSINOPHIL # BLD MANUAL: 0.39 THOUSAND/UL (ref 0–0.4)
EOSINOPHIL NFR BLD MANUAL: 3 % (ref 0–6)
ERYTHROCYTE [DISTWIDTH] IN BLOOD BY AUTOMATED COUNT: 14.8 % (ref 11.6–15.1)
GFR SERPL CREATININE-BSD FRML MDRD: 97 ML/MIN/1.73SQ M
GLUCOSE SERPL-MCNC: 116 MG/DL (ref 65–140)
GLUCOSE SERPL-MCNC: 148 MG/DL (ref 65–140)
GLUCOSE SERPL-MCNC: 162 MG/DL (ref 65–140)
GLUCOSE SERPL-MCNC: 182 MG/DL (ref 65–140)
GLUCOSE SERPL-MCNC: 190 MG/DL (ref 65–140)
HCT VFR BLD AUTO: 25 % (ref 36.5–49.3)
HGB BLD-MCNC: 7.8 G/DL (ref 12–17)
LYMPHOCYTES # BLD AUTO: 0.52 THOUSAND/UL (ref 0.6–4.47)
LYMPHOCYTES # BLD AUTO: 4 % (ref 14–44)
MAGNESIUM SERPL-MCNC: 2.5 MG/DL (ref 1.6–2.6)
MCH RBC QN AUTO: 31.7 PG (ref 26.8–34.3)
MCHC RBC AUTO-ENTMCNC: 31.2 G/DL (ref 31.4–37.4)
MCV RBC AUTO: 102 FL (ref 82–98)
METAMYELOCYTES NFR BLD MANUAL: 2 % (ref 0–1)
MONOCYTES # BLD AUTO: 0.52 THOUSAND/UL (ref 0–1.22)
MONOCYTES NFR BLD: 4 % (ref 4–12)
NEUTROPHILS # BLD MANUAL: 10.79 THOUSAND/UL (ref 1.85–7.62)
NEUTS BAND NFR BLD MANUAL: 4 % (ref 0–8)
NEUTS SEG NFR BLD AUTO: 79 % (ref 43–75)
PHOSPHATE SERPL-MCNC: 3.6 MG/DL (ref 2.3–4.1)
PLATELET # BLD AUTO: 350 THOUSANDS/UL (ref 149–390)
PLATELET BLD QL SMEAR: ADEQUATE
PMV BLD AUTO: 10.8 FL (ref 8.9–12.7)
POLYCHROMASIA BLD QL SMEAR: PRESENT
POTASSIUM SERPL-SCNC: 4.4 MMOL/L (ref 3.5–5.3)
PROT SERPL-MCNC: 6.8 G/DL (ref 6.4–8.2)
RBC # BLD AUTO: 2.46 MILLION/UL (ref 3.88–5.62)
RBC MORPH BLD: PRESENT
SODIUM SERPL-SCNC: 141 MMOL/L (ref 136–145)
VARIANT LYMPHS # BLD AUTO: 4 %
WBC # BLD AUTO: 13 THOUSAND/UL (ref 4.31–10.16)

## 2021-10-11 PROCEDURE — 85007 BL SMEAR W/DIFF WBC COUNT: CPT | Performed by: INTERNAL MEDICINE

## 2021-10-11 PROCEDURE — 99233 SBSQ HOSP IP/OBS HIGH 50: CPT | Performed by: INTERNAL MEDICINE

## 2021-10-11 PROCEDURE — 82948 REAGENT STRIP/BLOOD GLUCOSE: CPT

## 2021-10-11 PROCEDURE — 83735 ASSAY OF MAGNESIUM: CPT | Performed by: INTERNAL MEDICINE

## 2021-10-11 PROCEDURE — 85027 COMPLETE CBC AUTOMATED: CPT | Performed by: INTERNAL MEDICINE

## 2021-10-11 PROCEDURE — 80053 COMPREHEN METABOLIC PANEL: CPT | Performed by: INTERNAL MEDICINE

## 2021-10-11 PROCEDURE — 84100 ASSAY OF PHOSPHORUS: CPT | Performed by: INTERNAL MEDICINE

## 2021-10-11 RX ADMIN — ACETAMINOPHEN 650 MG: 325 TABLET, FILM COATED ORAL at 13:15

## 2021-10-11 RX ADMIN — INSULIN LISPRO 1 UNITS: 100 INJECTION, SOLUTION INTRAVENOUS; SUBCUTANEOUS at 19:21

## 2021-10-11 RX ADMIN — Medication 20 MG: at 08:25

## 2021-10-11 RX ADMIN — HEPARIN SODIUM 5000 UNITS: 5000 INJECTION INTRAVENOUS; SUBCUTANEOUS at 21:47

## 2021-10-11 RX ADMIN — MINERAL SUPPLEMENT IRON 300 MG / 5 ML STRENGTH LIQUID 100 PER BOX UNFLAVORED 300 MG: at 05:46

## 2021-10-11 RX ADMIN — INSULIN GLARGINE 15 UNITS: 100 INJECTION, SOLUTION SUBCUTANEOUS at 21:47

## 2021-10-11 RX ADMIN — AMLODIPINE BESYLATE 10 MG: 10 TABLET ORAL at 08:23

## 2021-10-11 RX ADMIN — HEPARIN SODIUM 5000 UNITS: 5000 INJECTION INTRAVENOUS; SUBCUTANEOUS at 05:42

## 2021-10-11 RX ADMIN — LISINOPRIL 40 MG: 20 TABLET ORAL at 08:23

## 2021-10-11 RX ADMIN — INSULIN LISPRO 2 UNITS: 100 INJECTION, SOLUTION INTRAVENOUS; SUBCUTANEOUS at 05:51

## 2021-10-11 RX ADMIN — INSULIN LISPRO 3 UNITS: 100 INJECTION, SOLUTION INTRAVENOUS; SUBCUTANEOUS at 00:17

## 2021-10-11 RX ADMIN — HEPARIN SODIUM 5000 UNITS: 5000 INJECTION INTRAVENOUS; SUBCUTANEOUS at 13:15

## 2021-10-11 RX ADMIN — DOXAZOSIN 2 MG: 2 TABLET ORAL at 08:23

## 2021-10-11 RX ADMIN — FOLIC ACID 1 MG: 1 TABLET ORAL at 08:23

## 2021-10-11 RX ADMIN — IRON SUCROSE 100 MG: 20 INJECTION, SOLUTION INTRAVENOUS at 11:12

## 2021-10-11 RX ADMIN — FINASTERIDE 5 MG: 5 TABLET, FILM COATED ORAL at 08:24

## 2021-10-12 VITALS
DIASTOLIC BLOOD PRESSURE: 55 MMHG | RESPIRATION RATE: 20 BRPM | SYSTOLIC BLOOD PRESSURE: 119 MMHG | HEIGHT: 62 IN | BODY MASS INDEX: 24.48 KG/M2 | HEART RATE: 70 BPM | TEMPERATURE: 98.3 F | OXYGEN SATURATION: 96 % | WEIGHT: 133 LBS

## 2021-10-12 LAB
FLUAV RNA RESP QL NAA+PROBE: NEGATIVE
FLUBV RNA RESP QL NAA+PROBE: NEGATIVE
GLUCOSE SERPL-MCNC: 103 MG/DL (ref 65–140)
GLUCOSE SERPL-MCNC: 145 MG/DL (ref 65–140)
GLUCOSE SERPL-MCNC: 172 MG/DL (ref 65–140)
RSV RNA RESP QL NAA+PROBE: NEGATIVE
SARS-COV-2 RNA RESP QL NAA+PROBE: NEGATIVE

## 2021-10-12 PROCEDURE — 82948 REAGENT STRIP/BLOOD GLUCOSE: CPT

## 2021-10-12 PROCEDURE — 0241U HB NFCT DS VIR RESP RNA 4 TRGT: CPT | Performed by: STUDENT IN AN ORGANIZED HEALTH CARE EDUCATION/TRAINING PROGRAM

## 2021-10-12 PROCEDURE — 97535 SELF CARE MNGMENT TRAINING: CPT

## 2021-10-12 PROCEDURE — 97530 THERAPEUTIC ACTIVITIES: CPT

## 2021-10-12 PROCEDURE — 99239 HOSP IP/OBS DSCHRG MGMT >30: CPT | Performed by: INTERNAL MEDICINE

## 2021-10-12 RX ORDER — FOLIC ACID 1 MG/1
1 TABLET ORAL DAILY
Qty: 30 TABLET | Refills: 0
Start: 2021-10-13 | End: 2022-03-03 | Stop reason: SDUPTHER

## 2021-10-12 RX ORDER — AMLODIPINE BESYLATE 5 MG/1
5 TABLET ORAL DAILY
Status: DISCONTINUED | OUTPATIENT
Start: 2021-10-13 | End: 2021-10-12 | Stop reason: HOSPADM

## 2021-10-12 RX ORDER — LISINOPRIL 20 MG/1
20 TABLET ORAL DAILY
Status: DISCONTINUED | OUTPATIENT
Start: 2021-10-13 | End: 2021-10-12 | Stop reason: HOSPADM

## 2021-10-12 RX ORDER — ACETAMINOPHEN 325 MG/1
650 TABLET ORAL EVERY 6 HOURS PRN
Qty: 30 TABLET | Refills: 0
Start: 2021-10-12

## 2021-10-12 RX ORDER — DOXAZOSIN 2 MG/1
2 TABLET ORAL DAILY
Qty: 30 TABLET | Refills: 0
Start: 2021-10-13 | End: 2022-03-28 | Stop reason: SDUPTHER

## 2021-10-12 RX ORDER — LISINOPRIL 20 MG/1
20 TABLET ORAL DAILY
Qty: 30 TABLET | Refills: 0
Start: 2021-10-13 | End: 2022-01-19 | Stop reason: DRUGHIGH

## 2021-10-12 RX ORDER — AMLODIPINE BESYLATE 5 MG/1
5 TABLET ORAL DAILY
Qty: 30 TABLET | Refills: 0
Start: 2021-10-13 | End: 2022-03-03 | Stop reason: SDUPTHER

## 2021-10-12 RX ORDER — FERROUS SULFATE 300 MG/5ML
300 LIQUID (ML) ORAL
Qty: 100 ML | Refills: 0
Start: 2021-10-13 | End: 2022-01-19 | Stop reason: CLARIF

## 2021-10-12 RX ADMIN — IRON SUCROSE 100 MG: 20 INJECTION, SOLUTION INTRAVENOUS at 08:58

## 2021-10-12 RX ADMIN — INSULIN LISPRO 1 UNITS: 100 INJECTION, SOLUTION INTRAVENOUS; SUBCUTANEOUS at 12:19

## 2021-10-12 RX ADMIN — ACETAMINOPHEN 650 MG: 325 TABLET, FILM COATED ORAL at 15:05

## 2021-10-12 RX ADMIN — Medication 20 MG: at 08:58

## 2021-10-12 RX ADMIN — MINERAL SUPPLEMENT IRON 300 MG / 5 ML STRENGTH LIQUID 100 PER BOX UNFLAVORED 300 MG: at 06:09

## 2021-10-12 RX ADMIN — FOLIC ACID 1 MG: 1 TABLET ORAL at 08:53

## 2021-10-12 RX ADMIN — HEPARIN SODIUM 5000 UNITS: 5000 INJECTION INTRAVENOUS; SUBCUTANEOUS at 06:09

## 2021-10-12 RX ADMIN — HEPARIN SODIUM 5000 UNITS: 5000 INJECTION INTRAVENOUS; SUBCUTANEOUS at 15:44

## 2021-10-12 RX ADMIN — FINASTERIDE 5 MG: 5 TABLET, FILM COATED ORAL at 08:56

## 2021-10-19 ENCOUNTER — TELEPHONE (OUTPATIENT)
Dept: NEUROLOGY | Facility: CLINIC | Age: 81
End: 2021-10-19

## 2021-10-19 ENCOUNTER — TELEPHONE (OUTPATIENT)
Dept: HEMATOLOGY ONCOLOGY | Facility: CLINIC | Age: 81
End: 2021-10-19

## 2021-10-26 ENCOUNTER — TELEPHONE (OUTPATIENT)
Dept: NEUROLOGY | Facility: CLINIC | Age: 81
End: 2021-10-26

## 2021-11-02 ENCOUNTER — TELEPHONE (OUTPATIENT)
Dept: NEUROLOGY | Facility: CLINIC | Age: 81
End: 2021-11-02

## 2021-11-11 ENCOUNTER — TELEMEDICINE (OUTPATIENT)
Dept: NEUROLOGY | Facility: CLINIC | Age: 81
End: 2021-11-11
Payer: COMMERCIAL

## 2021-11-11 VITALS
BODY MASS INDEX: 23.67 KG/M2 | TEMPERATURE: 98 F | DIASTOLIC BLOOD PRESSURE: 62 MMHG | WEIGHT: 133.6 LBS | SYSTOLIC BLOOD PRESSURE: 124 MMHG | HEIGHT: 63 IN

## 2021-11-11 DIAGNOSIS — I68.0 CEREBRAL AMYLOID ANGIOPATHY (HCC): ICD-10-CM

## 2021-11-11 DIAGNOSIS — E85.4 CEREBRAL AMYLOID ANGIOPATHY (HCC): ICD-10-CM

## 2021-11-11 DIAGNOSIS — I61.9 CEREBRAL HEMORRHAGE (HCC): Primary | ICD-10-CM

## 2021-11-11 DIAGNOSIS — I10 PRIMARY HYPERTENSION: ICD-10-CM

## 2021-11-11 PROCEDURE — 99214 OFFICE O/P EST MOD 30 MIN: CPT | Performed by: PHYSICIAN ASSISTANT

## 2021-11-11 PROCEDURE — 1160F RVW MEDS BY RX/DR IN RCRD: CPT | Performed by: PHYSICIAN ASSISTANT

## 2021-11-11 PROCEDURE — 1036F TOBACCO NON-USER: CPT | Performed by: PHYSICIAN ASSISTANT

## 2021-11-11 RX ORDER — INSULIN GLARGINE 100 [IU]/ML
INJECTION, SOLUTION SUBCUTANEOUS
COMMUNITY
End: 2022-02-23 | Stop reason: HOSPADM

## 2021-11-11 RX ORDER — OXYCODONE HYDROCHLORIDE AND ACETAMINOPHEN 10; 300 MG/5ML; MG/5ML
2.5 SOLUTION ORAL
COMMUNITY
End: 2022-02-23 | Stop reason: HOSPADM

## 2021-12-02 ENCOUNTER — HOSPITAL ENCOUNTER (OUTPATIENT)
Dept: INFUSION CENTER | Facility: CLINIC | Age: 81
End: 2021-12-02

## 2022-01-19 ENCOUNTER — OFFICE VISIT (OUTPATIENT)
Dept: HEMATOLOGY ONCOLOGY | Facility: CLINIC | Age: 82
End: 2022-01-19
Payer: COMMERCIAL

## 2022-01-19 VITALS
HEIGHT: 63 IN | BODY MASS INDEX: 23.67 KG/M2 | OXYGEN SATURATION: 95 % | SYSTOLIC BLOOD PRESSURE: 120 MMHG | RESPIRATION RATE: 16 BRPM | TEMPERATURE: 97.6 F | HEART RATE: 85 BPM | DIASTOLIC BLOOD PRESSURE: 60 MMHG

## 2022-01-19 DIAGNOSIS — C61 PROSTATE CANCER (HCC): Primary | ICD-10-CM

## 2022-01-19 DIAGNOSIS — I61.9 CEREBRAL HEMORRHAGE (HCC): ICD-10-CM

## 2022-01-19 DIAGNOSIS — E85.4 CEREBRAL AMYLOID ANGIOPATHY (HCC): ICD-10-CM

## 2022-01-19 DIAGNOSIS — R97.20 PSA ELEVATION: ICD-10-CM

## 2022-01-19 DIAGNOSIS — I68.0 CEREBRAL AMYLOID ANGIOPATHY (HCC): ICD-10-CM

## 2022-01-19 PROCEDURE — 1160F RVW MEDS BY RX/DR IN RCRD: CPT | Performed by: INTERNAL MEDICINE

## 2022-01-19 PROCEDURE — 99214 OFFICE O/P EST MOD 30 MIN: CPT | Performed by: INTERNAL MEDICINE

## 2022-01-19 PROCEDURE — 1036F TOBACCO NON-USER: CPT | Performed by: INTERNAL MEDICINE

## 2022-01-19 RX ORDER — BISACODYL 10 MG
10 SUPPOSITORY, RECTAL RECTAL AS NEEDED
COMMUNITY

## 2022-01-19 RX ORDER — OMEPRAZOLE 20 MG/1
20 CAPSULE, DELAYED RELEASE ORAL DAILY
COMMUNITY
End: 2022-02-23 | Stop reason: HOSPADM

## 2022-01-19 RX ORDER — SODIUM PHOSPHATE, DIBASIC AND SODIUM PHOSPHATE, MONOBASIC 7; 19 G/133ML; G/133ML
1 ENEMA RECTAL AS NEEDED
COMMUNITY
End: 2022-02-23 | Stop reason: HOSPADM

## 2022-01-19 RX ORDER — LISINOPRIL 5 MG/1
5 TABLET ORAL DAILY
COMMUNITY
End: 2022-03-28 | Stop reason: SDUPTHER

## 2022-01-19 RX ORDER — CHOLECALCIFEROL (VITAMIN D3) 125 MCG
5 CAPSULE ORAL
COMMUNITY
End: 2022-02-23 | Stop reason: HOSPADM

## 2022-01-19 RX ORDER — FERROUS SULFATE 325(65) MG
325 TABLET ORAL
COMMUNITY
End: 2022-02-23 | Stop reason: HOSPADM

## 2022-01-19 RX ORDER — PAROXETINE 10 MG/1
10 TABLET, FILM COATED ORAL DAILY
COMMUNITY
End: 2022-02-23 | Stop reason: HOSPADM

## 2022-01-19 NOTE — PATIENT INSTRUCTIONS
Blood work and [facility] scan within 2 weeks  Visit in 4 weeks  Patient may need Lupron shot that day

## 2022-01-19 NOTE — PROGRESS NOTES
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  Because of hemorrhagic stroke and CAA he has not been on anti-platelet or anticoagulation medications  In 2010 patient was diagnosed to have  prostate cancer and no distant metastatic disease  [place] score was 6  Patient did not want radiation  He has been on Lupron Depot once every 3 months and last shot was in September 2021 and stroke after that in the same month     Patient has some incontinence of urine  There has been some increase in PSA  Patient was being followed by his urologist and  Urologist was planning to do prostate biopsy  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      Current Outpatient Medications:     acetaminophen (TYLENOL) 325 mg tablet, 2 tablets (650 mg total) by Per G Tube route every 6 (six) hours as needed for mild pain or headaches, Disp: 30 tablet, Rfl: 0    bisacodyl (Dulcolax) 10 mg suppository, Insert 10 mg into the rectum as needed for constipation, Disp: , Rfl:     doxazosin (CARDURA) 2 mg tablet, 1 tablet (2 mg total) by Per G Tube route daily, Disp: 30 tablet, Rfl: 0    ERROR: CANNOT USE RATIO BASED PRESCRIPTION MIXTURE NAMING FOR A NON-MIXTURE, 10 mL (20 mg total) by Per PEG Tube route daily, Disp: 200 mL, Rfl: 0    ferrous sulfate (KP Ferrous Sulfate) 325 (65 Fe) mg tablet, Take 325 mg by mouth daily with breakfast, Disp: , Rfl:     finasteride (PROSCAR) 5 mg tablet, Take 1 tablet (5 mg total) by mouth daily, Disp: 90 tablet, Rfl: 2    folic acid (FOLVITE) 1 mg tablet, 1 tablet (1 mg total) by Per PEG Tube route daily, Disp: 30 tablet, Rfl: 0    insulin glargine (Basaglar KwikPen) 100 units/mL injection pen, Inject under the skin, Disp: , Rfl:     insulin regular (HumuLIN R,NovoLIN R) 100 units/mL injection, Inject 0 04 mL (4 Units total) under the skin every 6 (six) hours, Disp: 10 mL, Rfl: 0    lisinopril (ZESTRIL) 5 mg tablet, Take 5 mg by mouth daily, Disp: , Rfl:     magnesium hydroxide (MILK OF MAGNESIA) 400 mg/5 mL oral suspension, Take by mouth as needed for constipation, Disp: , Rfl:     Melatonin 5 MG TABS, Take 5 mg by mouth daily at bedtime, Disp: , Rfl:     omeprazole (PriLOSEC) 20 mg delayed release capsule, Take 20 mg by mouth daily, Disp: , Rfl:     oxyCODONE-Acetaminophen  MG/5ML SOLN, Take 2 5 mg by mouth, Disp: , Rfl:     PARoxetine (PAXIL) 10 mg tablet, Take 10 mg by mouth daily, Disp: , Rfl:     sodium phosphate-biphosphate (FLEET) 7-19 g 118 mL enema, Insert 1 enema into the rectum as needed, Disp: , Rfl:     amLODIPine (NORVASC) 5 mg tablet, 1 tablet (5 mg total) by Per G Tube route daily (Patient not taking: Reported on 11/11/2021 ), Disp: 30 tablet, Rfl: 0    No Known Allergies    Oncology History    No history exists  ROS:  01/19/22 Reviewed 13 systems:  See symptoms in HPI  Patient's daughter helped with interpretation  Presently no other neurological, cardiac, pulmonary, GI and  symptoms  Other symptoms are in HPI  No fever, chills, bleeding, bone pains, skin rash and weight loss  Not unusually sensitive to heat or cold  No swelling of the ankles  No swollen glands  Patient is anxious  Other symptoms are in HPI        /60 (BP Location: Right arm, Patient Position: Sitting, Cuff Size: Adult)   Pulse 85   Temp 97 6 °F (36 4 °C) (Temporal)   Resp 16   Ht 5' 3" (1 6 m)   SpO2 95%   BMI 23 67 kg/m²     Physical Exam:    Patient looked alert and he was talking freely with his daughter    Not in distress,  no icterus, no oral thrush, no palpable neck mass, clear lung fields, regular heart rate, abdomen  soft and non tender, no palpable abdominal mass, no ascites, no edema of ankles, left hemiplegia,  no skin rash, no palpable lymphadenopathy, no clubbing  Performance status 4     IMAGING:  IMPRESSION:     1  PI-RADSv2 Category 1 - Very low (clinically significant cancer is highly unlikely to be present)  2  Calculated prostate volume of 61 mL   3  3 6 x 9 2 cm subcutaneous cystic structure overlying the sacrum      Note: Clinically significant cancer is defined on pathology/histology as [place] score greater than or equal to 7, and/or volume of greater than or equal to 0 5 mL, and/or extraprostatic extension            Workstation performed: [id number]      Imaging     MRI prostate multiparametric wo w contrast (Order: [id number]) - 3/13/2020   IMPRESSION:     1  No scintigraphic evidence of osseous metastasis           Workstation performed: [id number]      Imaging     NM bone scan whole body (Order: [id number]) - 2/17/2020     IMPRESSION:     1  No evidence of metastasis within the chest, abdomen, or pelvis  2   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  3   Diverticulosis without evidence of diverticulitis  4   Thickening of the bladder likely due to chronic bladder outlet obstruction due to enlarged prostate, correlate with urinalysis to exclude infection  5   Large subcutaneous fluid density cystic structure along the midline lower back measuring 9 2 x 3 8 x 6 6 cm, nonspecific however may represent a sebaceous cyst, correlate clinically    6   Other findings as above      Workstation performed: [id number]      Imaging     CT chest abdomen pelvis w contrast (Order: [id number]) - 2/17/2020     LABS:    Results for orders placed or performed during the hospital encounter of 09/16/21   Colonel Nolan + 2 more items   Result Value Ref Range    Supplier Name [facility]/[address] [place]     Supplier Phone Number [phone]     Order Status Pending Delivery Ticket, Contacting Patient     Delivery Note      Delivery Request Date 10/06/2021     Item Description 3 in 1 Commode     Item Description Standard Wheelchair, 18 in x 16 in     Item Description Standard Seat Width, 18 in     Item Description Standard Seat Depth, 16 in     Item Description Wheelchair Anti Tippers     Item Description Wheelchair Armests, Adjustable Height, Desk Length     Item Description Wheelchair Back Cushion, 18 in     Item Description Wheelchair Brake Extenders     Item Description Wheelchair Heel Ros[place] / Loops     Item Description Swing-Away Foot Rests     Item Description Wheelchair Seat Cushion, 18 in, General Use     Item Description Guillermo Buchanan, Adult    Semi Electric Bed Package   Result Value Ref Range    Supplier Name [address]     Supplier Phone Number [phone]     Order Status Pending Delivery Ticket, Pending Patient Contact     Delivery Note      Delivery Request Date 10/06/2021     Item Description Semi-Electric Hospital Bed, Gel Overlay     Item Description Standard Mattress     Item Description Gel Overlay     Item Description Half Bed Rails    Blood culture    Specimen: Arm, Right; Blood   Result Value Ref Range    Blood Culture No Growth After 5 Days  Blood culture    Specimen: Arm, Left; Blood   Result Value Ref Range    Blood Culture No Growth After 5 Days      Urine culture    Specimen: Urine   Result Value Ref Range    Urine Culture >100,000 cfu/ml Escherichia coli ESBL (A)     Urine Culture >100,000 cfu/ml Escherichia coli (A)        Susceptibility    Escherichia coli - TONG     ZID Performed Yes       Amoxicillin + Clavulanate <=4/2 Susceptible ug/ml     Ampicillin ($$) >16 00 Resistant ug/ml     Ampicillin + Sulbactam ($) 16/8 Intermediate ug/ml     Aztreonam ($$$)  <=4 Susceptible ug/ml     Cefazolin ($) <=2 00 Susceptible ug/ml     Ciprofloxacin ($)  <=1 00 Susceptible ug/ml     Ertapenem ($$$) <=0 5 Susceptible ug/ml     Gentamicin ($$) <=1 Susceptible ug/ml     Levofloxacin ($) 0 50 Susceptible ug/ml     Nitrofurantoin <=32 Susceptible ug/ml     Tetracycline <=4 Susceptible ug/ml     Tobramycin ($) 2 Susceptible ug/ml     Trimethoprim + Sulfamethoxazole ($$$) >2/38 Resistant ug/ml    Escherichia coli ESBL - TONG     ZID Performed Yes       Amoxicillin + Clavulanate 8/4 Susceptible ug/ml     Ampicillin ($$) >16 00 Resistant ug/ml     Ampicillin + Sulbactam ($) 16/8 Intermediate ug/ml     Aztreonam ($$$)  8 Resistant ug/ml     Cefazolin ($) >16 00 Resistant ug/ml     Cefepime ($) >16 00 Resistant ug/ml     Cefotaxime ($) >32 00 Resistant ug/ml     Ceftazidime ($$) <=1 Resistant ug/ml     Ceftriaxone ($$) >32 00 Resistant ug/ml     Cefuroxime ($$) >16 Resistant ug/ml     Ciprofloxacin ($)  >2 00 Resistant ug/ml     Ertapenem ($$$) <=0 5 Susceptible ug/ml     Gentamicin ($$) 4 Susceptible ug/ml     Levofloxacin ($) >4 00 Resistant ug/ml     Nitrofurantoin <=32 Susceptible ug/ml     Piperacillin + Tazobactam ($$$) <=4 Susceptible ug/ml     Tetracycline <=4 Susceptible ug/ml     Tobramycin ($) 2 Susceptible ug/ml     Trimethoprim + Sulfamethoxazole ($$$) >2/38 Resistant ug/ml     Fosfomycin   0 750 Susceptible ug/ml   COVID19, Influenza A/B, RSV PCR, [facility]    Specimen: Nose; Nares   Result Value Ref Range    SARS-CoV-2 Negative Negative    INFLUENZA A PCR Negative Negative    INFLUENZA B PCR Negative Negative    RSV PCR Negative Negative   Blood culture    Specimen: Arm, Left; Blood   Result Value Ref Range    Blood Culture No Growth After 5 Days  Blood culture    Specimen: Arm, Left; Blood   Result Value Ref Range    Blood Culture No Growth After 5 Days      Novel Coronavirus (Covid-19),PCR [facility]    Specimen: Nose; Nares   Result Value Ref Range    SARS-CoV-2 Negative Negative   COVID19, Influenza A/B, RSV PCR, [facility]    Specimen: Nasopharyngeal Swab; Nares   Result Value Ref Range    SARS-CoV-2 Negative Negative    INFLUENZA A PCR Negative Negative    INFLUENZA B PCR Negative Negative    RSV PCR Negative Negative   Hemoglobin A1c w/EAG Estimation   Result Value Ref Range    Hemoglobin A1C 6 8 (H) Normal 3 8-5 6%; PreDiabetic 5 7-6 4%;  Diabetic >=6 5%; Glycemic control for adults with diabetes <7 0% %     mg/dl   Lipid Panel with Direct LDL reflex   Result Value Ref Range    Cholesterol 218 (H) 50 - 200 mg/dL    Triglycerides 59 <=150 mg/dL    HDL, Direct 61 >=40 mg/dL    LDL Calculated 145 (H) 0 - 100 mg/dL   Basic metabolic panel   Result Value Ref Range    Sodium 136 136 - 145 mmol/L    Potassium 4 0 3 5 - 5 3 mmol/L    Chloride 105 100 - 108 mmol/L    CO2 28 21 - 32 mmol/L    ANION GAP 3 (L) 4 - 13 mmol/L    BUN 15 5 - 25 mg/dL    Creatinine 0 80 0 60 - 1 30 mg/dL    Glucose 174 (H) 65 - 140 mg/dL    Calcium 8 7 8 3 - 10 1 mg/dL    eGFR 83 ml/min/1 73sq m   Magnesium   Result Value Ref Range    Magnesium 2 3 1 6 - 2 6 mg/dL   Phosphorus   Result Value Ref Range    Phosphorus 3 3 2 3 - 4 1 mg/dL   CBC and differential   Result Value Ref Range    WBC 11 32 (H) 4 31 - 10 16 Thousand/uL    RBC 3 75 (L) 3 88 - 5 62 Million/uL    Hemoglobin 11 9 (L) 12 0 - 17 0 g/dL    Hematocrit 36 3 (L) 36 5 - 49 3 %    MCV 97 82 - 98 fL    MCH 31 7 26 8 - 34 3 pg    MCHC 32 8 31 4 - 37 4 g/dL    RDW 12 9 11 6 - 15 1 %    MPV 10 7 8 9 - 12 7 fL    Platelets 038 690 - 859 Thousands/uL    nRBC 0 /100 WBCs    Neutrophils Relative 85 (H) 43 - 75 %    Immat GRANS % 0 0 - 2 %    Lymphocytes Relative 11 (L) 14 - 44 %    Monocytes Relative 4 4 - 12 %    Eosinophils Relative 0 0 - 6 %    Basophils Relative 0 0 - 1 %    Neutrophils Absolute 9 59 (H) 1 85 - 7 62 Thousands/µL    Immature Grans Absolute 0 04 0 00 - 0 20 Thousand/uL    Lymphocytes Absolute 1 19 0 60 - 4 47 Thousands/µL    Monocytes Absolute 0 45 0 17 - 1 22 Thousand/µL    Eosinophils Absolute 0 02 0 00 - 0 61 Thousand/µL    Basophils Absolute 0 03 0 00 - 0 10 Thousands/µL   Blood gas, venous   Result Value Ref Range    pH, Yoav 7 317 7 300 - 7 400    pCO2, Yoav 52 6 (H) 42 0 - 50 0 mm Hg    pO2, Yoav 33 6 (L) 35 0 - 45 0 mm Hg    HCO3, Yoav 26 3 24 - 30 mmol/L    Base Excess, Yoav -0 5 mmol/L    O2 Content, Yoav 10 2 ml/dL    O2 HGB, VENOUS 58 2 (L) 60 0 - 80 0 %    Nasal Cannula 2    CBC and differential   Result Value Ref Range    WBC 8 90 4 31 - 10 16 Thousand/uL    RBC 3 76 (L) 3 88 - 5 62 Million/uL    Hemoglobin 11 6 (L) 12 0 - 17 0 g/dL    Hematocrit 36 3 (L) 36 5 - 49 3 %    MCV 97 82 - 98 fL    MCH 30 9 26 8 - 34 3 pg    MCHC 32 0 31 4 - 37 4 g/dL    RDW 12 8 11 6 - 15 1 %    MPV 10 2 8 9 - 12 7 fL    Platelets 913 274 - 314 Thousands/uL    nRBC 0 /100 WBCs    Neutrophils Relative 79 (H) 43 - 75 %    Immat GRANS % 0 0 - 2 %    Lymphocytes Relative 13 (L) 14 - 44 %    Monocytes Relative 8 4 - 12 %    Eosinophils Relative 0 0 - 6 %    Basophils Relative 0 0 - 1 %    Neutrophils Absolute 6 89 1 85 - 7 62 Thousands/µL    Immature Grans Absolute 0 03 0 00 - 0 20 Thousand/uL    Lymphocytes Absolute 1 18 0 60 - 4 47 Thousands/µL    Monocytes Absolute 0 75 0 17 - 1 22 Thousand/µL    Eosinophils Absolute 0 02 0 00 - 0 61 Thousand/µL    Basophils Absolute 0 03 0 00 - 0 10 Thousands/µL   Magnesium   Result Value Ref Range    Magnesium 2 2 1 6 - 2 6 mg/dL   Phosphorus   Result Value Ref Range    Phosphorus 3 2 2 3 - 4 1 mg/dL   Basic metabolic panel   Result Value Ref Range    Sodium 137 136 - 145 mmol/L    Potassium 3 9 3 5 - 5 3 mmol/L    Chloride 105 100 - 108 mmol/L    CO2 28 21 - 32 mmol/L    ANION GAP 4 4 - 13 mmol/L    BUN 11 5 - 25 mg/dL    Creatinine 0 78 0 60 - 1 30 mg/dL    Glucose 141 (H) 65 - 140 mg/dL    Calcium 8 8 8 3 - 10 1 mg/dL    eGFR 83 ml/min/1 73sq m   Hepatic function panel   Result Value Ref Range    Total Bilirubin 1 48 (H) 0 20 - 1 00 mg/dL    Bilirubin, Direct 0 18 0 00 - 0 20 mg/dL    Alkaline Phosphatase 60 46 - 116 U/L    AST 15 5 - 45 U/L    ALT 13 12 - 78 U/L    Total Protein 7 0 6 4 - 8 2 g/dL    Albumin 2 9 (L) 3 5 - 5 0 g/dL   CBC   Result Value Ref Range    WBC 11 94 (H) 4 31 - 10 16 Thousand/uL    RBC 3 98 3 88 - 5 62 Million/uL    Hemoglobin 12 5 12 0 - 17 0 g/dL    Hematocrit 38 0 36 5 - 49 3 %    MCV 96 82 - 98 fL    MCH 31 4 26 8 - 34 3 pg    MCHC 32 9 31 4 - 37 4 g/dL    RDW 13 0 11 6 - 15 1 %    Platelets 773 770 - 507 Thousands/uL    MPV 10 2 8 9 - 12 7 fL   Magnesium   Result Value Ref Range    Magnesium 2 4 1 6 - 2 6 mg/dL   Phosphorus   Result Value Ref Range    Phosphorus 3 0 2 3 - 4 1 mg/dL   Basic metabolic panel   Result Value Ref Range    Sodium 136 136 - 145 mmol/L    Potassium 3 7 3 5 - 5 3 mmol/L    Chloride 106 100 - 108 mmol/L    CO2 24 21 - 32 mmol/L    ANION GAP 6 4 - 13 mmol/L    BUN 19 5 - 25 mg/dL    Creatinine 0 70 0 60 - 1 30 mg/dL    Glucose 169 (H) 65 - 140 mg/dL    Calcium 8 7 8 3 - 10 1 mg/dL    eGFR 87 ml/min/1 73sq m   Procalcitonin with AM Reflex   Result Value Ref Range    Procalcitonin 0 06 <=0 25 ng/ml   Urinalysis with microscopic   Result Value Ref Range    Clarity, UA Clear     Color, UA Yellow     Specific [place], UA 1 021 1 003 - 1 030    pH, UA 6 0 4 5, 5 0, 5 5, 6 0, 6 5, 7 0, 7 5, 8 0    Glucose, UA Negative Negative mg/dl    Ketones, UA Negative Negative mg/dl    Blood, UA Negative Negative    Protein, UA Negative Negative mg/dl    Nitrite, UA Negative Negative    Bilirubin, UA Negative Negative    Urobilinogen, UA 1 0 0 2, 1 0 E U /dl E U /dl    Leukocytes, UA Negative Negative    WBC, UA None Seen None Seen, 2-4, 5-60 /hpf    RBC, UA None Seen None Seen, 2-4 /hpf    Hyaline Casts, UA None Seen None Seen /lpf    Bacteria, UA None Seen None Seen, Occasional /hpf    Epithelial Cells None Seen None Seen, Occasional /hpf   Basic metabolic panel   Result Value Ref Range    Sodium 142 136 - 145 mmol/L    Potassium 4 8 3 5 - 5 3 mmol/L    Chloride 116 (H) 100 - 108 mmol/L    CO2 26 21 - 32 mmol/L    ANION GAP 0 (L) 4 - 13 mmol/L    BUN 21 5 - 25 mg/dL    Creatinine 0 71 0 60 - 1 30 mg/dL    Glucose 137 65 - 140 mg/dL    Calcium 8 4 8 3 - 10 1 mg/dL    eGFR 87 ml/min/1 73sq m   Osmolality-"If this is regarding a toxic alcohol, STOP  Test is not routinely indicated  Please consult medical  on call for further guidance "   Result Value Ref Range    Osmolality Serum 295 282 - 298 mmol/KG   Osmolality-"If this is regarding a toxic alcohol, STOP  Test is not routinely indicated  Please consult medical  on call for further guidance "   Result Value Ref Range    Osmolality Serum 308 (H) 282 - 298 mmol/KG   Procalcitonin Reflex   Result Value Ref Range    Procalcitonin <0 05 <=0 25 ng/ml   Basic metabolic panel   Result Value Ref Range    Sodium 143 136 - 145 mmol/L    Potassium 4 1 3 5 - 5 3 mmol/L    Chloride 116 (H) 100 - 108 mmol/L    CO2 24 21 - 32 mmol/L    ANION GAP 3 (L) 4 - 13 mmol/L    BUN 19 5 - 25 mg/dL    Creatinine 0 69 0 60 - 1 30 mg/dL    Glucose 191 (H) 65 - 140 mg/dL    Calcium 8 4 8 3 - 10 1 mg/dL    eGFR 88 ml/min/1 73sq m   Osmolality-"If this is regarding a toxic alcohol, STOP  Test is not routinely indicated  Please consult medical  on call for further guidance "   Result Value Ref Range    Osmolality Serum 306 (H) 282 - 298 mmol/KG   Basic metabolic panel   Result Value Ref Range    Sodium 145 136 - 145 mmol/L    Potassium 3 9 3 5 - 5 3 mmol/L    Chloride 117 (H) 100 - 108 mmol/L    CO2 27 21 - 32 mmol/L    ANION GAP 1 (L) 4 - 13 mmol/L    BUN 18 5 - 25 mg/dL    Creatinine 0 65 0 60 - 1 30 mg/dL    Glucose 162 (H) 65 - 140 mg/dL    Calcium 8 4 8 3 - 10 1 mg/dL    eGFR 90 ml/min/1 73sq m   Osmolality-"If this is regarding a toxic alcohol, STOP  Test is not routinely indicated   Please consult medical  on call for further guidance "   Result Value Ref Range    Osmolality Serum 315 (H) 282 - 298 mmol/KG   Basic metabolic panel   Result Value Ref Range    Sodium 146 (H) 136 - 145 mmol/L    Potassium 3 8 3 5 - 5 3 mmol/L    Chloride 118 (H) 100 - 108 mmol/L    CO2 23 21 - 32 mmol/L    ANION GAP 5 4 - 13 mmol/L    BUN 17 5 - 25 mg/dL    Creatinine 0 64 0 60 - 1 30 mg/dL    Glucose 209 (H) 65 - 140 mg/dL    Calcium 8 5 8 3 - 10 1 mg/dL    eGFR 91 ml/min/1 73sq m   Osmolality-"If this is regarding a toxic alcohol, STOP  Test is not routinely indicated   Please consult medical  on call for further guidance "   Result Value Ref Range    Osmolality Serum 311 (H) 282 - 298 mmol/KG   CBC and differential   Result Value Ref Range    WBC 12 16 (H) 4 31 - 10 16 Thousand/uL    RBC 3 84 (L) 3 88 - 5 62 Million/uL    Hemoglobin 11 9 (L) 12 0 - 17 0 g/dL    Hematocrit 37 7 36 5 - 49 3 %    MCV 98 82 - 98 fL    MCH 31 0 26 8 - 34 3 pg    MCHC 31 6 31 4 - 37 4 g/dL    RDW 13 6 11 6 - 15 1 %    MPV 10 3 8 9 - 12 7 fL    Platelets 480 979 - 805 Thousands/uL    nRBC 0 /100 WBCs    Neutrophils Relative 81 (H) 43 - 75 %    Immat GRANS % 1 0 - 2 %    Lymphocytes Relative 9 (L) 14 - 44 %    Monocytes Relative 7 4 - 12 %    Eosinophils Relative 1 0 - 6 %    Basophils Relative 1 0 - 1 %    Neutrophils Absolute 9 85 (H) 1 85 - 7 62 Thousands/µL    Immature Grans Absolute 0 13 0 00 - 0 20 Thousand/uL    Lymphocytes Absolute 1 14 0 60 - 4 47 Thousands/µL    Monocytes Absolute 0 88 0 17 - 1 22 Thousand/µL    Eosinophils Absolute 0 09 0 00 - 0 61 Thousand/µL    Basophils Absolute 0 07 0 00 - 0 10 Thousands/µL   Magnesium   Result Value Ref Range    Magnesium 2 6 1 6 - 2 6 mg/dL   Phosphorus   Result Value Ref Range    Phosphorus 2 6 2 3 - 4 1 mg/dL   Basic metabolic panel   Result Value Ref Range    Sodium 147 (H) 136 - 145 mmol/L    Potassium 3 7 3 5 - 5 3 mmol/L    Chloride 120 (H) 100 - 108 mmol/L    CO2 25 21 - 32 mmol/L    ANION GAP 2 (L) 4 - 13 mmol/L    BUN 18 5 - 25 mg/dL    Creatinine 0 72 0 60 - 1 30 mg/dL    Glucose 197 (H) 65 - 140 mg/dL    Calcium 8 7 8 3 - 10 1 mg/dL    eGFR 86 ml/min/1 73sq m   Osmolality-"If this is regarding a toxic alcohol, STOP  Test is not routinely indicated  Please consult medical  on call for further guidance "   Result Value Ref Range    Osmolality Serum 319 (H) 282 - 298 mmol/KG   Basic metabolic panel   Result Value Ref Range    Sodium 151 (H) 136 - 145 mmol/L    Potassium 3 8 3 5 - 5 3 mmol/L    Chloride 124 (H) 100 - 108 mmol/L    CO2 25 21 - 32 mmol/L    ANION GAP 2 (L) 4 - 13 mmol/L    BUN 22 5 - 25 mg/dL    Creatinine 0 80 0 60 - 1 30 mg/dL    Glucose 179 (H) 65 - 140 mg/dL    Calcium 7 9 (L) 8 3 - 10 1 mg/dL    eGFR 83 ml/min/1 73sq m   Osmolality-"If this is regarding a toxic alcohol, STOP  Test is not routinely indicated  Please consult medical  on call for further guidance "   Result Value Ref Range    Osmolality Serum 326 (H) 282 - 298 mmol/KG   Basic metabolic panel   Result Value Ref Range    Sodium 152 (H) 136 - 145 mmol/L    Potassium 3 8 3 5 - 5 3 mmol/L    Chloride 124 (H) 100 - 108 mmol/L    CO2 26 21 - 32 mmol/L    ANION GAP 2 (L) 4 - 13 mmol/L    BUN 24 5 - 25 mg/dL    Creatinine 0 82 0 60 - 1 30 mg/dL    Glucose 225 (H) 65 - 140 mg/dL    Calcium 8 4 8 3 - 10 1 mg/dL    eGFR 82 ml/min/1 73sq m   Osmolality-"If this is regarding a toxic alcohol, STOP  Test is not routinely indicated  Please consult medical  on call for further guidance "   Result Value Ref Range    Osmolality Serum 324 (H) 282 - 298 mmol/KG   Basic metabolic panel   Result Value Ref Range    Sodium 149 (H) 136 - 145 mmol/L    Potassium 3 8 3 5 - 5 3 mmol/L    Chloride 121 (H) 100 - 108 mmol/L    CO2 25 21 - 32 mmol/L    ANION GAP 3 (L) 4 - 13 mmol/L    BUN 24 5 - 25 mg/dL    Creatinine 0 83 0 60 - 1 30 mg/dL    Glucose 233 (H) 65 - 140 mg/dL    Calcium 8 5 8 3 - 10 1 mg/dL    eGFR 81 ml/min/1 73sq m   Osmolality-"If this is regarding a toxic alcohol, STOP  Test is not routinely indicated   Please consult medical  on call for further guidance "   Result Value Ref Range    Osmolality Serum 323 (H) 282 - 298 mmol/KG   CBC and differential   Result Value Ref Range    WBC 10 67 (H) 4 31 - 10 16 Thousand/uL    RBC 3 32 (L) 3 88 - 5 62 Million/uL    Hemoglobin 10 4 (L) 12 0 - 17 0 g/dL    Hematocrit 33 1 (L) 36 5 - 49 3 %     (H) 82 - 98 fL    MCH 31 3 26 8 - 34 3 pg    MCHC 31 4 31 4 - 37 4 g/dL    RDW 14 0 11 6 - 15 1 %    MPV 10 1 8 9 - 12 7 fL    Platelets 853 903 - 931 Thousands/uL    nRBC 0 /100 WBCs    Neutrophils Relative 79 (H) 43 - 75 %    Immat GRANS % 1 0 - 2 %    Lymphocytes Relative 11 (L) 14 - 44 %    Monocytes Relative 9 4 - 12 %    Eosinophils Relative 0 0 - 6 %    Basophils Relative 0 0 - 1 %    Neutrophils Absolute 8 45 (H) 1 85 - 7 62 Thousands/µL    Immature Grans Absolute 0 05 0 00 - 0 20 Thousand/uL    Lymphocytes Absolute 1 20 0 60 - 4 47 Thousands/µL    Monocytes Absolute 0 91 0 17 - 1 22 Thousand/µL    Eosinophils Absolute 0 02 0 00 - 0 61 Thousand/µL    Basophils Absolute 0 04 0 00 - 0 10 Thousands/µL   Magnesium   Result Value Ref Range    Magnesium 3 3 (H) 1 6 - 2 6 mg/dL   Phosphorus   Result Value Ref Range    Phosphorus 3 7 2 3 - 4 1 mg/dL   Basic metabolic panel   Result Value Ref Range    Sodium 152 (H) 136 - 145 mmol/L    Potassium 3 9 3 5 - 5 3 mmol/L    Chloride 121 (H) 100 - 108 mmol/L    CO2 27 21 - 32 mmol/L    ANION GAP 4 4 - 13 mmol/L    BUN 23 5 - 25 mg/dL    Creatinine 0 85 0 60 - 1 30 mg/dL    Glucose 199 (H) 65 - 140 mg/dL    Calcium 8 3 8 3 - 10 1 mg/dL    eGFR 81 ml/min/1 73sq m   Osmolality-"If this is regarding a toxic alcohol, STOP  Test is not routinely indicated   Please consult medical  on call for further guidance "   Result Value Ref Range    Osmolality Serum 327 (H) 282 - 298 mmol/KG   Basic metabolic panel   Result Value Ref Range    Sodium 150 (H) 136 - 145 mmol/L    Potassium 3 8 3 5 - 5 3 mmol/L    Chloride 120 (H) 100 - 108 mmol/L    CO2 27 21 - 32 mmol/L    ANION GAP 3 (L) 4 - 13 mmol/L    BUN 28 (H) 5 - 25 mg/dL    Creatinine 0 86 0 60 - 1 30 mg/dL    Glucose 185 (H) 65 - 140 mg/dL    Calcium 8 7 8 3 - 10 1 mg/dL    eGFR 80 ml/min/1 73sq m   CBC and differential   Result Value Ref Range    WBC 11 37 (H) 4 31 - 10 16 Thousand/uL    RBC 3 41 (L) 3 88 - 5 62 Million/uL    Hemoglobin 10 6 (L) 12 0 - 17 0 g/dL    Hematocrit 34 4 (L) 36 5 - 49 3 %     (H) 82 - 98 fL    MCH 31 1 26 8 - 34 3 pg    MCHC 30 8 (L) 31 4 - 37 4 g/dL    RDW 14 4 11 6 - 15 1 %    MPV 10 4 8 9 - 12 7 fL    Platelets 866 408 - 628 Thousands/uL    nRBC 0 /100 WBCs    Neutrophils Relative 76 (H) 43 - 75 %    Immat GRANS % 1 0 - 2 %    Lymphocytes Relative 13 (L) 14 - 44 %    Monocytes Relative 9 4 - 12 %    Eosinophils Relative 0 0 - 6 %    Basophils Relative 1 0 - 1 %    Neutrophils Absolute 8 55 (H) 1 85 - 7 62 Thousands/µL    Immature Grans Absolute 0 15 0 00 - 0 20 Thousand/uL    Lymphocytes Absolute 1 52 0 60 - 4 47 Thousands/µL    Monocytes Absolute 1 04 0 17 - 1 22 Thousand/µL    Eosinophils Absolute 0 05 0 00 - 0 61 Thousand/µL    Basophils Absolute 0 06 0 00 - 0 10 Thousands/µL   Osmolality-"If this is regarding a toxic alcohol, STOP  Test is not routinely indicated   Please consult medical  on call for further guidance "   Result Value Ref Range    Osmolality Serum 334 (H) 282 - 298 mmol/KG   TSH, 3rd generation with Free T4 reflex   Result Value Ref Range    TSH 3RD GENERATON 1 150 0 358 - 3 740 uIU/mL   Basic metabolic panel   Result Value Ref Range    Sodium 149 (H) 136 - 145 mmol/L    Potassium 4 0 3 5 - 5 3 mmol/L    Chloride 119 (H) 100 - 108 mmol/L    CO2 28 21 - 32 mmol/L    ANION GAP 2 (L) 4 - 13 mmol/L    BUN 31 (H) 5 - 25 mg/dL    Creatinine 0 82 0 60 - 1 30 mg/dL    Glucose 205 (H) 65 - 140 mg/dL    Calcium 8 5 8 3 - 10 1 mg/dL    eGFR 82 ml/min/1 73sq m   CBC and differential   Result Value Ref Range    WBC 9 76 4 31 - 10 16 Thousand/uL    RBC 3 31 (L) 3 88 - 5 62 Million/uL    Hemoglobin 10 3 (L) 12 0 - 17 0 g/dL    Hematocrit 33 7 (L) 36 5 - 49 3 %     (H) 82 - 98 fL    MCH 31 1 26 8 - 34 3 pg    MCHC 30 6 (L) 31 4 - 37 4 g/dL    RDW 14 2 11 6 - 15 1 %    MPV 10 5 8 9 - 12 7 fL    Platelets 082 718 - 242 Thousands/uL    nRBC 0 /100 WBCs    Neutrophils Relative 73 43 - 75 %    Immat GRANS % 1 0 - 2 %    Lymphocytes Relative 17 14 - 44 %    Monocytes Relative 7 4 - 12 %    Eosinophils Relative 1 0 - 6 %    Basophils Relative 1 0 - 1 %    Neutrophils Absolute 7 25 1 85 - 7 62 Thousands/µL    Immature Grans Absolute 0 05 0 00 - 0 20 Thousand/uL    Lymphocytes Absolute 1 61 0 60 - 4 47 Thousands/µL    Monocytes Absolute 0 66 0 17 - 1 22 Thousand/µL    Eosinophils Absolute 0 14 0 00 - 0 61 Thousand/µL    Basophils Absolute 0 05 0 00 - 0 10 Thousands/µL   Magnesium   Result Value Ref Range    Magnesium 2 8 (H) 1 6 - 2 6 mg/dL   Phosphorus   Result Value Ref Range    Phosphorus 4 1 2 3 - 4 1 mg/dL   Basic metabolic panel   Result Value Ref Range    Sodium 149 (H) 136 - 145 mmol/L    Potassium 4 0 3 5 - 5 3 mmol/L    Chloride 118 (H) 100 - 108 mmol/L    CO2 28 21 - 32 mmol/L    ANION GAP 3 (L) 4 - 13 mmol/L    BUN 32 (H) 5 - 25 mg/dL    Creatinine 0 97 0 60 - 1 30 mg/dL    Glucose 227 (H) 65 - 140 mg/dL    Calcium 8 4 8 3 - 10 1 mg/dL    eGFR 72 ml/min/1 73sq m   CBC and differential   Result Value Ref Range    WBC 9 34 4 31 - 10 16 Thousand/uL    RBC 3 35 (L) 3 88 - 5 62 Million/uL    Hemoglobin 10 5 (L) 12 0 - 17 0 g/dL    Hematocrit 34 3 (L) 36 5 - 49 3 %     (H) 82 - 98 fL    MCH 31 3 26 8 - 34 3 pg    MCHC 30 6 (L) 31 4 - 37 4 g/dL    RDW 14 2 11 6 - 15 1 %    MPV 10 8 8 9 - 12 7 fL    Platelets 123 515 - 936 Thousands/uL    nRBC 0 /100 WBCs    Neutrophils Relative 73 43 - 75 %    Immat GRANS % 1 0 - 2 %    Lymphocytes Relative 18 14 - 44 %    Monocytes Relative 8 4 - 12 %    Eosinophils Relative 0 0 - 6 %    Basophils Relative 0 0 - 1 %    Neutrophils Absolute 6  78 1 85 - 7 62 Thousands/µL    Immature Grans Absolute 0 07 0 00 - 0 20 Thousand/uL    Lymphocytes Absolute 1 70 0 60 - 4 47 Thousands/µL    Monocytes Absolute 0 72 0 17 - 1 22 Thousand/µL    Eosinophils Absolute 0 03 0 00 - 0 61 Thousand/µL    Basophils Absolute 0 04 0 00 - 0 10 Thousands/µL   Magnesium   Result Value Ref Range    Magnesium 3 0 (H) 1 6 - 2 6 mg/dL   Phosphorus   Result Value Ref Range    Phosphorus 3 7 2 3 - 4 1 mg/dL   CBC and differential   Result Value Ref Range    WBC 10 98 (H) 4 31 - 10 16 Thousand/uL    RBC 3 27 (L) 3 88 - 5 62 Million/uL    Hemoglobin 10 2 (L) 12 0 - 17 0 g/dL    Hematocrit 33 4 (L) 36 5 - 49 3 %     (H) 82 - 98 fL    MCH 31 2 26 8 - 34 3 pg    MCHC 30 5 (L) 31 4 - 37 4 g/dL    RDW 13 8 11 6 - 15 1 %    MPV 10 9 8 9 - 12 7 fL    Platelets 296 625 - 599 Thousands/uL    nRBC 0 /100 WBCs    Neutrophils Relative 68 43 - 75 %    Immat GRANS % 1 0 - 2 %    Lymphocytes Relative 20 14 - 44 %    Monocytes Relative 8 4 - 12 %    Eosinophils Relative 2 0 - 6 %    Basophils Relative 1 0 - 1 %    Neutrophils Absolute 7 54 1 85 - 7 62 Thousands/µL    Immature Grans Absolute 0 09 0 00 - 0 20 Thousand/uL    Lymphocytes Absolute 2 23 0 60 - 4 47 Thousands/µL    Monocytes Absolute 0 89 0 17 - 1 22 Thousand/µL    Eosinophils Absolute 0 17 0 00 - 0 61 Thousand/µL    Basophils Absolute 0 06 0 00 - 0 10 Thousands/µL   Comprehensive metabolic panel   Result Value Ref Range    Sodium 150 (H) 136 - 145 mmol/L    Potassium 3 8 3 5 - 5 3 mmol/L    Chloride 117 (H) 100 - 108 mmol/L    CO2 27 21 - 32 mmol/L    ANION GAP 6 4 - 13 mmol/L    BUN 37 (H) 5 - 25 mg/dL    Creatinine 0 89 0 60 - 1 30 mg/dL    Glucose 218 (H) 65 - 140 mg/dL    Calcium 8 2 (L) 8 3 - 10 1 mg/dL    Corrected Calcium 9 7 8 3 - 10 1 mg/dL    AST 67 (H) 5 - 45 U/L    ALT 78 12 - 78 U/L    Alkaline Phosphatase 81 46 - 116 U/L    Total Protein 6 5 6 4 - 8 2 g/dL    Albumin 2 1 (L) 3 5 - 5 0 g/dL    Total Bilirubin 0 48 0 20 - 1 00 mg/dL    eGFR 79 ml/min/1 73sq m   Magnesium   Result Value Ref Range    Magnesium 3 0 (H) 1 6 - 2 6 mg/dL   Phosphorus   Result Value Ref Range    Phosphorus 3 6 2 3 - 4 1 mg/dL   Basic metabolic panel   Result Value Ref Range    Sodium 149 (H) 136 - 145 mmol/L    Potassium 3 8 3 5 - 5 3 mmol/L    Chloride 118 (H) 100 - 108 mmol/L    CO2 28 21 - 32 mmol/L    ANION GAP 3 (L) 4 - 13 mmol/L    BUN 36 (H) 5 - 25 mg/dL    Creatinine 0 83 0 60 - 1 30 mg/dL    Glucose 207 (H) 65 - 140 mg/dL    Calcium 8 1 (L) 8 3 - 10 1 mg/dL    eGFR 81 ml/min/1 73sq m   Magnesium   Result Value Ref Range    Magnesium 2 9 (H) 1 6 - 2 6 mg/dL   Phosphorus   Result Value Ref Range    Phosphorus 3 3 2 3 - 4 1 mg/dL   CBC and differential   Result Value Ref Range    WBC 11 92 (H) 4 31 - 10 16 Thousand/uL    RBC 3 21 (L) 3 88 - 5 62 Million/uL    Hemoglobin 10 1 (L) 12 0 - 17 0 g/dL    Hematocrit 33 1 (L) 36 5 - 49 3 %     (H) 82 - 98 fL    MCH 31 5 26 8 - 34 3 pg    MCHC 30 5 (L) 31 4 - 37 4 g/dL    RDW 13 7 11 6 - 15 1 %    MPV 11 5 8 9 - 12 7 fL    Platelets 241 385 - 352 Thousands/uL    nRBC 0 /100 WBCs    Neutrophils Relative 71 43 - 75 %    Immat GRANS % 1 0 - 2 %    Lymphocytes Relative 18 14 - 44 %    Monocytes Relative 7 4 - 12 %    Eosinophils Relative 3 0 - 6 %    Basophils Relative 0 0 - 1 %    Neutrophils Absolute 8 55 (H) 1 85 - 7 62 Thousands/µL    Immature Grans Absolute 0 07 0 00 - 0 20 Thousand/uL    Lymphocytes Absolute 2 17 0 60 - 4 47 Thousands/µL    Monocytes Absolute 0 78 0 17 - 1 22 Thousand/µL    Eosinophils Absolute 0 30 0 00 - 0 61 Thousand/µL    Basophils Absolute 0 05 0 00 - 0 10 Thousands/µL   Basic metabolic panel   Result Value Ref Range    Sodium 150 (H) 136 - 145 mmol/L    Potassium 3 7 3 5 - 5 3 mmol/L    Chloride 119 (H) 100 - 108 mmol/L    CO2 27 21 - 32 mmol/L    ANION GAP 4 4 - 13 mmol/L    BUN 45 (H) 5 - 25 mg/dL    Creatinine 0 89 0 60 - 1 30 mg/dL    Glucose 184 (H) 65 - 140 mg/dL Calcium 8 2 (L) 8 3 - 10 1 mg/dL    eGFR 79 ml/min/1 73sq m   CBC and differential   Result Value Ref Range    WBC 11 47 (H) 4 31 - 10 16 Thousand/uL    RBC 3 20 (L) 3 88 - 5 62 Million/uL    Hemoglobin 10 0 (L) 12 0 - 17 0 g/dL    Hematocrit 33 1 (L) 36 5 - 49 3 %     (H) 82 - 98 fL    MCH 31 3 26 8 - 34 3 pg    MCHC 30 2 (L) 31 4 - 37 4 g/dL    RDW 13 6 11 6 - 15 1 %    MPV 12 0 8 9 - 12 7 fL    Platelets 043 746 - 362 Thousands/uL    nRBC 0 /100 WBCs    Neutrophils Relative 71 43 - 75 %    Immat GRANS % 1 0 - 2 %    Lymphocytes Relative 18 14 - 44 %    Monocytes Relative 7 4 - 12 %    Eosinophils Relative 3 0 - 6 %    Basophils Relative 0 0 - 1 %    Neutrophils Absolute 8 14 (H) 1 85 - 7 62 Thousands/µL    Immature Grans Absolute 0 10 0 00 - 0 20 Thousand/uL    Lymphocytes Absolute 2 10 0 60 - 4 47 Thousands/µL    Monocytes Absolute 0 78 0 17 - 1 22 Thousand/µL    Eosinophils Absolute 0 30 0 00 - 0 61 Thousand/µL    Basophils Absolute 0 05 0 00 - 0 10 Thousands/µL   Comprehensive metabolic panel   Result Value Ref Range    Sodium 149 (H) 136 - 145 mmol/L    Potassium 3 9 3 5 - 5 3 mmol/L    Chloride 120 (H) 100 - 108 mmol/L    CO2 27 21 - 32 mmol/L    ANION GAP 2 (L) 4 - 13 mmol/L    BUN 30 (H) 5 - 25 mg/dL    Creatinine 0 78 0 60 - 1 30 mg/dL    Glucose 200 (H) 65 - 140 mg/dL    Calcium 8 2 (L) 8 3 - 10 1 mg/dL    Corrected Calcium 9 7 8 3 - 10 1 mg/dL    AST 71 (H) 5 - 45 U/L    ALT 85 (H) 12 - 78 U/L    Alkaline Phosphatase 82 46 - 116 U/L    Total Protein 6 7 6 4 - 8 2 g/dL    Albumin 2 1 (L) 3 5 - 5 0 g/dL    Total Bilirubin 0 34 0 20 - 1 00 mg/dL    eGFR 83 ml/min/1 73sq m   Procalcitonin   Result Value Ref Range    Procalcitonin 0 14 <=0 25 ng/ml   Procalcitonin Reflex   Result Value Ref Range    Procalcitonin 0 13 <=0 25 ng/ml   CBC and differential   Result Value Ref Range    WBC 12 46 (H) 4 31 - 10 16 Thousand/uL    RBC 3 26 (L) 3 88 - 5 62 Million/uL    Hemoglobin 10 2 (L) 12 0 - 17 0 g/dL Hematocrit 33 4 (L) 36 5 - 49 3 %     (H) 82 - 98 fL    MCH 31 3 26 8 - 34 3 pg    MCHC 30 5 (L) 31 4 - 37 4 g/dL    RDW 13 3 11 6 - 15 1 %    MPV 11 6 8 9 - 12 7 fL    Platelets 074 558 - 558 Thousands/uL    nRBC 0 /100 WBCs    Neutrophils Relative 69 43 - 75 %    Immat GRANS % 2 0 - 2 %    Lymphocytes Relative 19 14 - 44 %    Monocytes Relative 8 4 - 12 %    Eosinophils Relative 2 0 - 6 %    Basophils Relative 0 0 - 1 %    Neutrophils Absolute 8 56 (H) 1 85 - 7 62 Thousands/µL    Immature Grans Absolute 0 20 0 00 - 0 20 Thousand/uL    Lymphocytes Absolute 2 39 0 60 - 4 47 Thousands/µL    Monocytes Absolute 0 96 0 17 - 1 22 Thousand/µL    Eosinophils Absolute 0 30 0 00 - 0 61 Thousand/µL    Basophils Absolute 0 05 0 00 - 0 10 Thousands/µL   Basic metabolic panel   Result Value Ref Range    Sodium 144 136 - 145 mmol/L    Potassium 3 9 3 5 - 5 3 mmol/L    Chloride 118 (H) 100 - 108 mmol/L    CO2 25 21 - 32 mmol/L    ANION GAP 1 (L) 4 - 13 mmol/L    BUN 24 5 - 25 mg/dL    Creatinine 0 64 0 60 - 1 30 mg/dL    Glucose 208 (H) 65 - 140 mg/dL    Calcium 8 4 8 3 - 10 1 mg/dL    eGFR 91 ml/min/1 73sq m   Basic metabolic panel   Result Value Ref Range    Sodium 142 136 - 145 mmol/L    Potassium 4 4 3 5 - 5 3 mmol/L    Chloride 113 (H) 100 - 108 mmol/L    CO2 25 21 - 32 mmol/L    ANION GAP 4 4 - 13 mmol/L    BUN 25 5 - 25 mg/dL    Creatinine 0 49 (L) 0 60 - 1 30 mg/dL    Glucose 209 (H) 65 - 140 mg/dL    Calcium 6 8 (L) 8 3 - 10 1 mg/dL    eGFR 101 ml/min/1 73sq m   CBC and differential   Result Value Ref Range    WBC 11 27 (H) 4 31 - 10 16 Thousand/uL    RBC 2 65 (L) 3 88 - 5 62 Million/uL    Hemoglobin 8 3 (L) 12 0 - 17 0 g/dL    Hematocrit 26 7 (L) 36 5 - 49 3 %     (H) 82 - 98 fL    MCH 31 3 26 8 - 34 3 pg    MCHC 31 1 (L) 31 4 - 37 4 g/dL    RDW 13 2 11 6 - 15 1 %    MPV 11 4 8 9 - 12 7 fL    Platelets 949 612 - 574 Thousands/uL    nRBC 0 /100 WBCs    Neutrophils Relative 72 43 - 75 %    Immat GRANS % 1 0 - 2 %    Lymphocytes Relative 19 14 - 44 %    Monocytes Relative 6 4 - 12 %    Eosinophils Relative 2 0 - 6 %    Basophils Relative 0 0 - 1 %    Neutrophils Absolute 8 01 (H) 1 85 - 7 62 Thousands/µL    Immature Grans Absolute 0 14 0 00 - 0 20 Thousand/uL    Lymphocytes Absolute 2 14 0 60 - 4 47 Thousands/µL    Monocytes Absolute 0 69 0 17 - 1 22 Thousand/µL    Eosinophils Absolute 0 25 0 00 - 0 61 Thousand/µL    Basophils Absolute 0 04 0 00 - 0 10 Thousands/µL   Vitamin B12   Result Value Ref Range    Vitamin B-12 1,181 (H) 100 - 900 pg/mL   Folate   Result Value Ref Range    Folate 7 7 3 1 - 17 5 ng/mL   Hemoglobin and hematocrit, blood   Result Value Ref Range    Hemoglobin 9 6 (L) 12 0 - 17 0 g/dL    Hematocrit 30 7 (L) 36 5 - 49 3 %   CBC and differential   Result Value Ref Range    WBC 10 98 (H) 4 31 - 10 16 Thousand/uL    RBC 3 07 (L) 3 88 - 5 62 Million/uL    Hemoglobin 9 7 (L) 12 0 - 17 0 g/dL    Hematocrit 30 5 (L) 36 5 - 49 3 %    MCV 99 (H) 82 - 98 fL    MCH 31 6 26 8 - 34 3 pg    MCHC 31 8 31 4 - 37 4 g/dL    RDW 13 4 11 6 - 15 1 %    MPV 11 1 8 9 - 12 7 fL    Platelets 066 328 - 883 Thousands/uL    nRBC 0 /100 WBCs    Neutrophils Relative 73 43 - 75 %    Immat GRANS % 1 0 - 2 %    Lymphocytes Relative 16 14 - 44 %    Monocytes Relative 7 4 - 12 %    Eosinophils Relative 2 0 - 6 %    Basophils Relative 1 0 - 1 %    Neutrophils Absolute 8 04 (H) 1 85 - 7 62 Thousands/µL    Immature Grans Absolute 0 12 0 00 - 0 20 Thousand/uL    Lymphocytes Absolute 1 74 0 60 - 4 47 Thousands/µL    Monocytes Absolute 0 79 0 17 - 1 22 Thousand/µL    Eosinophils Absolute 0 24 0 00 - 0 61 Thousand/µL    Basophils Absolute 0 05 0 00 - 0 10 Thousands/µL   Basic metabolic panel   Result Value Ref Range    Sodium 137 136 - 145 mmol/L    Potassium 4 3 3 5 - 5 3 mmol/L    Chloride 110 (H) 100 - 108 mmol/L    CO2 26 21 - 32 mmol/L    ANION GAP 1 (L) 4 - 13 mmol/L    BUN 19 5 - 25 mg/dL    Creatinine 0 50 (L) 0 60 - 1 30 mg/dL Glucose 107 65 - 140 mg/dL    Calcium 8 5 8 3 - 10 1 mg/dL    eGFR 100 ml/min/1 73sq m   Magnesium   Result Value Ref Range    Magnesium 2 4 1 6 - 2 6 mg/dL   Phosphorus   Result Value Ref Range    Phosphorus 3 8 2 3 - 4 1 mg/dL   Comprehensive metabolic panel   Result Value Ref Range    Sodium 136 136 - 145 mmol/L    Potassium 4 3 3 5 - 5 3 mmol/L    Chloride 107 100 - 108 mmol/L    CO2 24 21 - 32 mmol/L    ANION GAP 5 4 - 13 mmol/L    BUN 18 5 - 25 mg/dL    Creatinine 0 52 (L) 0 60 - 1 30 mg/dL    Glucose 98 65 - 140 mg/dL    Calcium 9 1 8 3 - 10 1 mg/dL    Corrected Calcium 10 6 (H) 8 3 - 10 1 mg/dL    AST 73 (H) 5 - 45 U/L     (H) 12 - 78 U/L    Alkaline Phosphatase 97 46 - 116 U/L    Total Protein 6 6 6 4 - 8 2 g/dL    Albumin 2 1 (L) 3 5 - 5 0 g/dL    Total Bilirubin 0 51 0 20 - 1 00 mg/dL    eGFR 99 ml/min/1 73sq m   Comprehensive metabolic panel   Result Value Ref Range    Sodium 140 136 - 145 mmol/L    Potassium 3 8 3 5 - 5 3 mmol/L    Chloride 110 (H) 100 - 108 mmol/L    CO2 25 21 - 32 mmol/L    ANION GAP 5 4 - 13 mmol/L    BUN 18 5 - 25 mg/dL    Creatinine 0 60 0 60 - 1 30 mg/dL    Glucose 325 (H) 65 - 140 mg/dL    Calcium 8 5 8 3 - 10 1 mg/dL    Corrected Calcium 10 1 8 3 - 10 1 mg/dL    AST 46 (H) 5 - 45 U/L     (H) 12 - 78 U/L    Alkaline Phosphatase 81 46 - 116 U/L    Total Protein 5 9 (L) 6 4 - 8 2 g/dL    Albumin 2 0 (L) 3 5 - 5 0 g/dL    Total Bilirubin 0 55 0 20 - 1 00 mg/dL    eGFR 93 ml/min/1 73sq m   CBC and differential   Result Value Ref Range    WBC 11 80 (H) 4 31 - 10 16 Thousand/uL    RBC 2 91 (L) 3 88 - 5 62 Million/uL    Hemoglobin 9 1 (L) 12 0 - 17 0 g/dL    Hematocrit 28 9 (L) 36 5 - 49 3 %    MCV 99 (H) 82 - 98 fL    MCH 31 3 26 8 - 34 3 pg    MCHC 31 5 31 4 - 37 4 g/dL    RDW 13 5 11 6 - 15 1 %    MPV 10 9 8 9 - 12 7 fL    Platelets 097 137 - 109 Thousands/uL    nRBC 0 /100 WBCs    Neutrophils Relative 78 (H) 43 - 75 %    Immat GRANS % 1 0 - 2 %    Lymphocytes Relative 12 (L) 14 - 44 %    Monocytes Relative 8 4 - 12 %    Eosinophils Relative 1 0 - 6 %    Basophils Relative 0 0 - 1 %    Neutrophils Absolute 9 31 (H) 1 85 - 7 62 Thousands/µL    Immature Grans Absolute 0 08 0 00 - 0 20 Thousand/uL    Lymphocytes Absolute 1 36 0 60 - 4 47 Thousands/µL    Monocytes Absolute 0 89 0 17 - 1 22 Thousand/µL    Eosinophils Absolute 0 14 0 00 - 0 61 Thousand/µL    Basophils Absolute 0 02 0 00 - 0 10 Thousands/µL   CBC and differential   Result Value Ref Range    WBC 15 24 (H) 4 31 - 10 16 Thousand/uL    RBC 2 99 (L) 3 88 - 5 62 Million/uL    Hemoglobin 9 6 (L) 12 0 - 17 0 g/dL    Hematocrit 30 1 (L) 36 5 - 49 3 %     (H) 82 - 98 fL    MCH 32 1 26 8 - 34 3 pg    MCHC 31 9 31 4 - 37 4 g/dL    RDW 13 8 11 6 - 15 1 %    MPV 11 1 8 9 - 12 7 fL    Platelets 738 095 - 310 Thousands/uL    nRBC 0 /100 WBCs    Neutrophils Relative 80 (H) 43 - 75 %    Immat GRANS % 1 0 - 2 %    Lymphocytes Relative 11 (L) 14 - 44 %    Monocytes Relative 7 4 - 12 %    Eosinophils Relative 1 0 - 6 %    Basophils Relative 0 0 - 1 %    Neutrophils Absolute 12 20 (H) 1 85 - 7 62 Thousands/µL    Immature Grans Absolute 0 08 0 00 - 0 20 Thousand/uL    Lymphocytes Absolute 1 73 0 60 - 4 47 Thousands/µL    Monocytes Absolute 1 09 0 17 - 1 22 Thousand/µL    Eosinophils Absolute 0 11 0 00 - 0 61 Thousand/µL    Basophils Absolute 0 03 0 00 - 0 10 Thousands/µL   Comprehensive metabolic panel   Result Value Ref Range    Sodium 139 136 - 145 mmol/L    Potassium 3 6 3 5 - 5 3 mmol/L    Chloride 107 100 - 108 mmol/L    CO2 28 21 - 32 mmol/L    ANION GAP 4 4 - 13 mmol/L    BUN 18 5 - 25 mg/dL    Creatinine 0 52 (L) 0 60 - 1 30 mg/dL    Glucose 133 65 - 140 mg/dL    Calcium 8 6 8 3 - 10 1 mg/dL    Corrected Calcium 10 2 (H) 8 3 - 10 1 mg/dL    AST 39 5 - 45 U/L    ALT 87 (H) 12 - 78 U/L    Alkaline Phosphatase 93 46 - 116 U/L    Total Protein 6 6 6 4 - 8 2 g/dL    Albumin 2 0 (L) 3 5 - 5 0 g/dL    Total Bilirubin 0 67 0 20 - 1 00 mg/dL    eGFR 99 ml/min/1 73sq m   Procalcitonin with AM Reflex   Result Value Ref Range    Procalcitonin 0 15 <=0 25 ng/ml   UA w Reflex to Microscopic w Reflex to Culture    Specimen: Urine, Other   Result Value Ref Range    Color, UA Yellow     Clarity, UA Cloudy     Specific [place], UA 1 026 1 003 - 1 030    pH, UA 6 0 4 5, 5 0, 5 5, 6 0, 6 5, 7 0, 7 5, 8 0    Leukocytes, UA Negative Negative    Nitrite, UA Positive (A) Negative    Protein, UA Negative Negative mg/dl    Glucose, UA Negative Negative mg/dl    Ketones, UA Negative Negative mg/dl    Urobilinogen, UA 2 0 (A) 0 2, 1 0 E U /dl E U /dl    Bilirubin, UA Negative Negative    Blood, UA Negative Negative   Urine Microscopic   Result Value Ref Range    RBC, UA None Seen None Seen, 2-4 /hpf    WBC, UA 2-4 None Seen, 2-4, 5-60 /hpf    Epithelial Cells None Seen None Seen, Occasional /hpf    Bacteria, UA Innumerable (A) None Seen, Occasional /hpf    Hyaline Casts, UA None Seen None Seen /lpf   Procalcitonin Reflex   Result Value Ref Range    Procalcitonin 0 35 (H) <=0 25 ng/ml   CBC and differential   Result Value Ref Range    WBC 16 66 (H) 4 31 - 10 16 Thousand/uL    RBC 3 26 (L) 3 88 - 5 62 Million/uL    Hemoglobin 10 4 (L) 12 0 - 17 0 g/dL    Hematocrit 32 9 (L) 36 5 - 49 3 %     (H) 82 - 98 fL    MCH 31 9 26 8 - 34 3 pg    MCHC 31 6 31 4 - 37 4 g/dL    RDW 14 1 11 6 - 15 1 %    MPV 11 0 8 9 - 12 7 fL    Platelets 747 (H) 647 - 390 Thousands/uL    nRBC 0 /100 WBCs    Neutrophils Relative 87 (H) 43 - 75 %    Immat GRANS % 1 0 - 2 %    Lymphocytes Relative 5 (L) 14 - 44 %    Monocytes Relative 7 4 - 12 %    Eosinophils Relative 0 0 - 6 %    Basophils Relative 0 0 - 1 %    Neutrophils Absolute 14 56 (H) 1 85 - 7 62 Thousands/µL    Immature Grans Absolute 0 10 0 00 - 0 20 Thousand/uL    Lymphocytes Absolute 0 78 0 60 - 4 47 Thousands/µL    Monocytes Absolute 1 14 0 17 - 1 22 Thousand/µL    Eosinophils Absolute 0 05 0 00 - 0 61 Thousand/µL    Basophils Absolute 0 03 0 00 - 0 10 Thousands/µL   Basic metabolic panel   Result Value Ref Range    Sodium 138 136 - 145 mmol/L    Potassium 4 0 3 5 - 5 3 mmol/L    Chloride 107 100 - 108 mmol/L    CO2 26 21 - 32 mmol/L    ANION GAP 5 4 - 13 mmol/L    BUN 29 (H) 5 - 25 mg/dL    Creatinine 0 61 0 60 - 1 30 mg/dL    Glucose 228 (H) 65 - 140 mg/dL    Calcium 8 9 8 3 - 10 1 mg/dL    eGFR 92 ml/min/1 73sq m   Lactic acid, plasma   Result Value Ref Range    LACTIC ACID 1 5 0 5 - 2 0 mmol/L   Basic metabolic panel   Result Value Ref Range    Sodium 137 136 - 145 mmol/L    Potassium 4 1 3 5 - 5 3 mmol/L    Chloride 106 100 - 108 mmol/L    CO2 26 21 - 32 mmol/L    ANION GAP 5 4 - 13 mmol/L    BUN 47 (H) 5 - 25 mg/dL    Creatinine 0 96 0 60 - 1 30 mg/dL    Glucose 220 (H) 65 - 140 mg/dL    Calcium 8 9 8 3 - 10 1 mg/dL    eGFR 73 ml/min/1 73sq m   CBC and differential   Result Value Ref Range    WBC 12 21 (H) 4 31 - 10 16 Thousand/uL    RBC 3 20 (L) 3 88 - 5 62 Million/uL    Hemoglobin 10 1 (L) 12 0 - 17 0 g/dL    Hematocrit 31 8 (L) 36 5 - 49 3 %    MCV 99 (H) 82 - 98 fL    MCH 31 6 26 8 - 34 3 pg    MCHC 31 8 31 4 - 37 4 g/dL    RDW 14 5 11 6 - 15 1 %    MPV 10 5 8 9 - 12 7 fL    Platelets 538 (H) 162 - 390 Thousands/uL    nRBC 0 /100 WBCs    Neutrophils Relative 83 (H) 43 - 75 %    Immat GRANS % 1 0 - 2 %    Lymphocytes Relative 8 (L) 14 - 44 %    Monocytes Relative 8 4 - 12 %    Eosinophils Relative 0 0 - 6 %    Basophils Relative 0 0 - 1 %    Neutrophils Absolute 10 24 (H) 1 85 - 7 62 Thousands/µL    Immature Grans Absolute 0 06 0 00 - 0 20 Thousand/uL    Lymphocytes Absolute 0 92 0 60 - 4 47 Thousands/µL    Monocytes Absolute 0 95 0 17 - 1 22 Thousand/µL    Eosinophils Absolute 0 02 0 00 - 0 61 Thousand/µL    Basophils Absolute 0 02 0 00 - 0 10 Thousands/µL   CBC and differential   Result Value Ref Range    WBC 9 82 4 31 - 10 16 Thousand/uL    RBC 2 97 (L) 3 88 - 5 62 Million/uL    Hemoglobin 9 4 (L) 12 0 - 17 0 g/dL Hematocrit 29 5 (L) 36 5 - 49 3 %    MCV 99 (H) 82 - 98 fL    MCH 31 6 26 8 - 34 3 pg    MCHC 31 9 31 4 - 37 4 g/dL    RDW 14 6 11 6 - 15 1 %    MPV 10 2 8 9 - 12 7 fL    Platelets 882 396 - 758 Thousands/uL   Lactic acid, plasma   Result Value Ref Range    LACTIC ACID 1 7 0 5 - 2 0 mmol/L   Procalcitonin with AM Reflex   Result Value Ref Range    Procalcitonin 1 49 (H) <=0 25 ng/ml   Comprehensive metabolic panel   Result Value Ref Range    Sodium 143 136 - 145 mmol/L    Potassium 3 7 3 5 - 5 3 mmol/L    Chloride 110 (H) 100 - 108 mmol/L    CO2 28 21 - 32 mmol/L    ANION GAP 5 4 - 13 mmol/L    BUN 42 (H) 5 - 25 mg/dL    Creatinine 0 82 0 60 - 1 30 mg/dL    Glucose 92 65 - 140 mg/dL    Calcium 8 9 8 3 - 10 1 mg/dL    Corrected Calcium 10 7 (H) 8 3 - 10 1 mg/dL    AST 18 5 - 45 U/L    ALT 48 12 - 78 U/L    Alkaline Phosphatase 84 46 - 116 U/L    Total Protein 6 3 (L) 6 4 - 8 2 g/dL    Albumin 1 7 (L) 3 5 - 5 0 g/dL    Total Bilirubin 0 48 0 20 - 1 00 mg/dL    eGFR 82 ml/min/1 73sq m   CBC and differential   Result Value Ref Range    WBC 10 12 4 31 - 10 16 Thousand/uL    RBC 2 81 (L) 3 88 - 5 62 Million/uL    Hemoglobin 8 9 (L) 12 0 - 17 0 g/dL    Hematocrit 27 8 (L) 36 5 - 49 3 %    MCV 99 (H) 82 - 98 fL    MCH 31 7 26 8 - 34 3 pg    MCHC 32 0 31 4 - 37 4 g/dL    RDW 14 6 11 6 - 15 1 %    MPV 10 5 8 9 - 12 7 fL    Platelets 483 714 - 412 Thousands/uL    nRBC 0 /100 WBCs   Lactic acid, plasma   Result Value Ref Range    LACTIC ACID 1 2 0 5 - 2 0 mmol/L   Procalcitonin Reflex   Result Value Ref Range    Procalcitonin 0 87 (H) <=0 25 ng/ml   CBC and differential   Result Value Ref Range    WBC 13 41 (H) 4 31 - 10 16 Thousand/uL    RBC 2 60 (L) 3 88 - 5 62 Million/uL    Hemoglobin 8 1 (L) 12 0 - 17 0 g/dL    Hematocrit 26 5 (L) 36 5 - 49 3 %     (H) 82 - 98 fL    MCH 31 2 26 8 - 34 3 pg    MCHC 30 6 (L) 31 4 - 37 4 g/dL    RDW 14 6 11 6 - 15 1 %    MPV 10 2 8 9 - 12 7 fL    Platelets 680 213 - 901 Thousands/uL nRBC 0 /100 WBCs   Basic metabolic panel   Result Value Ref Range    Sodium 151 (H) 136 - 145 mmol/L    Potassium 2 9 (L) 3 5 - 5 3 mmol/L    Chloride 119 (H) 100 - 108 mmol/L    CO2 27 21 - 32 mmol/L    ANION GAP 5 4 - 13 mmol/L    BUN 29 (H) 5 - 25 mg/dL    Creatinine 0 60 0 60 - 1 30 mg/dL    Glucose 89 65 - 140 mg/dL    Calcium 8 5 8 3 - 10 1 mg/dL    eGFR 93 ml/min/1 73sq m   Hepatic function panel   Result Value Ref Range    Total Bilirubin 0 55 0 20 - 1 00 mg/dL    Bilirubin, Direct 0 26 (H) 0 00 - 0 20 mg/dL    Alkaline Phosphatase 97 46 - 116 U/L    AST 56 (H) 5 - 45 U/L    ALT 60 12 - 78 U/L    Total Protein 6 0 (L) 6 4 - 8 2 g/dL    Albumin 1 6 (L) 3 5 - 5 0 g/dL   Basic metabolic panel   Result Value Ref Range    Sodium 149 (H) 136 - 145 mmol/L    Potassium 3 3 (L) 3 5 - 5 3 mmol/L    Chloride 118 (H) 100 - 108 mmol/L    CO2 27 21 - 32 mmol/L    ANION GAP 4 4 - 13 mmol/L    BUN 24 5 - 25 mg/dL    Creatinine 0 65 0 60 - 1 30 mg/dL    Glucose 235 (H) 65 - 140 mg/dL    Calcium 8 3 8 3 - 10 1 mg/dL    eGFR 90 ml/min/1 73sq m   Basic metabolic panel   Result Value Ref Range    Sodium 147 (H) 136 - 145 mmol/L    Potassium 3 6 3 5 - 5 3 mmol/L    Chloride 116 (H) 100 - 108 mmol/L    CO2 27 21 - 32 mmol/L    ANION GAP 4 4 - 13 mmol/L    BUN 22 5 - 25 mg/dL    Creatinine 0 58 (L) 0 60 - 1 30 mg/dL    Glucose 193 (H) 65 - 140 mg/dL    Calcium 8 0 (L) 8 3 - 10 1 mg/dL    eGFR 94 ml/min/1 73sq m   CBC and differential   Result Value Ref Range    WBC 16 46 (H) 4 31 - 10 16 Thousand/uL    RBC 2 18 (L) 3 88 - 5 62 Million/uL    Hemoglobin 6 9 (LL) 12 0 - 17 0 g/dL    Hematocrit 22 6 (L) 36 5 - 49 3 %     (H) 82 - 98 fL    MCH 31 7 26 8 - 34 3 pg    MCHC 30 5 (L) 31 4 - 37 4 g/dL    RDW 14 9 11 6 - 15 1 %    MPV 10 6 8 9 - 12 7 fL    Platelets 052 214 - 057 Thousands/uL    nRBC 0 /100 WBCs    Neutrophils Relative 82 (H) 43 - 75 %    Immat GRANS % 2 0 - 2 %    Lymphocytes Relative 10 (L) 14 - 44 %    Monocytes Relative 5 4 - 12 %    Eosinophils Relative 1 0 - 6 %    Basophils Relative 0 0 - 1 %    Neutrophils Absolute 13 40 (H) 1 85 - 7 62 Thousands/µL    Immature Grans Absolute 0 34 (H) 0 00 - 0 20 Thousand/uL    Lymphocytes Absolute 1 71 0 60 - 4 47 Thousands/µL    Monocytes Absolute 0 79 0 17 - 1 22 Thousand/µL    Eosinophils Absolute 0 17 0 00 - 0 61 Thousand/µL    Basophils Absolute 0 05 0 00 - 0 10 Thousands/µL   Hemoglobin and hematocrit, blood   Result Value Ref Range    Hemoglobin 7 8 (L) 12 0 - 17 0 g/dL    Hematocrit 25 0 (L) 36 5 - 49 3 %   Comprehensive metabolic panel   Result Value Ref Range    Sodium 147 (H) 136 - 145 mmol/L    Potassium 4 4 3 5 - 5 3 mmol/L    Chloride 118 (H) 100 - 108 mmol/L    CO2 28 21 - 32 mmol/L    ANION GAP 1 (L) 4 - 13 mmol/L    BUN 19 5 - 25 mg/dL    Creatinine 0 64 0 60 - 1 30 mg/dL    Glucose 192 (H) 65 - 140 mg/dL    Calcium 8 5 8 3 - 10 1 mg/dL    Corrected Calcium 10 4 (H) 8 3 - 10 1 mg/dL     (H) 5 - 45 U/L     (H) 12 - 78 U/L    Alkaline Phosphatase 152 (H) 46 - 116 U/L    Total Protein 6 4 6 4 - 8 2 g/dL    Albumin 1 6 (L) 3 5 - 5 0 g/dL    Total Bilirubin 0 45 0 20 - 1 00 mg/dL    eGFR 91 ml/min/1 73sq m   CBC and differential   Result Value Ref Range    WBC 13 49 (H) 4 31 - 10 16 Thousand/uL    RBC 2 52 (L) 3 88 - 5 62 Million/uL    Hemoglobin 7 9 (L) 12 0 - 17 0 g/dL    Hematocrit 26 1 (L) 36 5 - 49 3 %     (H) 82 - 98 fL    MCH 31 3 26 8 - 34 3 pg    MCHC 30 3 (L) 31 4 - 37 4 g/dL    RDW 14 9 11 6 - 15 1 %    MPV 10 4 8 9 - 12 7 fL    Platelets 951 150 - 927 Thousands/uL   Magnesium   Result Value Ref Range    Magnesium 2 7 (H) 1 6 - 2 6 mg/dL   Comprehensive metabolic panel   Result Value Ref Range    Sodium 143 136 - 145 mmol/L    Potassium 4 4 3 5 - 5 3 mmol/L    Chloride 113 (H) 100 - 108 mmol/L    CO2 29 21 - 32 mmol/L    ANION GAP 1 (L) 4 - 13 mmol/L    BUN 16 5 - 25 mg/dL    Creatinine 0 58 (L) 0 60 - 1 30 mg/dL    Glucose 175 (H) 65 - 140 mg/dL    Calcium 8 4 8 3 - 10 1 mg/dL    Corrected Calcium 10 3 (H) 8 3 - 10 1 mg/dL    AST 69 (H) 5 - 45 U/L     (H) 12 - 78 U/L    Alkaline Phosphatase 141 (H) 46 - 116 U/L    Total Protein 6 2 (L) 6 4 - 8 2 g/dL    Albumin 1 6 (L) 3 5 - 5 0 g/dL    Total Bilirubin 0 34 0 20 - 1 00 mg/dL    eGFR 94 ml/min/1 73sq m     *Note: Due to a large number of results and/or encounters for the requested time period, some results have not been displayed  A complete set of results can be found in Results Review  Labs, Imaging, & Other studies:   All pertinent labs and imaging studies were personally reviewed      Reviewed lab results and and discussed with patient's daughter  Assessment and plan:   Patient is here with his daughter  Patient's daughter is bilingual   Patient has been at rehabilitation center post stroke in September 2021  He has left hemiplegia  Daughter states speech has improved  He has gastric feeding tube but lately he has been eating much better and feeding tube is not being used but flushed     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  Because of hemorrhagic stroke and CAA he has not been on anti-platelet or anticoagulation medications  In 2010 patient was diagnosed to have  prostate cancer and no distant metastatic disease  [place]ay score was 6  Patient did not want radiation  He has been on Lupron Depot once every 3 months and last shot was in September 2021 and stroke after that in the same month     Patient has some incontinence of urine  There has been some increase in PSA  Patient was being followed by his urologist and  Urologist was planning to do prostate biopsy  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     Physical examination and test results are as recorded and discussed with patient's daughter and she interpreted that to his father    Patient will have re-evaluation of his prostate cancer  Obtained informed consent again for Lupron because of risk of thromboembolic phenomena with Lupron and discuss that with them and other side effects  Patient's stroke was hemorrhagic and not thrombotic  Discussed continuation of physical therapy and do the exercises with his arms or legs especially left lower leg to prevent DVT because he will not be a candidate for blood thinner  Compression stockings  They would like to get started on Lupron  Patient did not want any other medication for prostate cancer  After evaluation additional accommodations will be made  See diagnoses, orders and instructions below  Advised to continue taking  calcium with vitamin-D  Daughter mentioned that patient will be going home soon and she will be taking care of him  All discussed in detail  Questions answered  Diet and activities as tolerated  Patient needs assistance in his care  Goal is prolongation of survival   Also discussed prevention against coronavirus    For high blood pressure and other medical problems he will follow with his primary physician  1  Prostate cancer ([facility] [address] )    - CBC and differential; Future  - Comprehensive metabolic panel; Future  - PSA Total, Diagnostic; Future  - NM PET CT skull base to mid thigh; Future  - Testosterone; Future    2  PSA elevation    - PSA Total, Diagnostic; Future  - NM PET CT skull base to mid thigh; Future    3  Cerebral amyloid angiopathy (HCC)      4  [facility] ([facility] )      Blood work and Axumin PET scan within 2 weeks  Visit in 4 weeks  Patient may need Lupron shot that day  Patient's daughter voiced understanding and agrees      Counseling / Coordination of Care       Provided counseling and support

## 2022-01-21 ENCOUNTER — TELEPHONE (OUTPATIENT)
Dept: HEMATOLOGY ONCOLOGY | Facility: CLINIC | Age: 82
End: 2022-01-21

## 2022-01-21 DIAGNOSIS — C61 PROSTATE CANCER (HCC): Primary | ICD-10-CM

## 2022-01-21 NOTE — TELEPHONE ENCOUNTER
Telephone call spoke nancy dtr  Updated that pt is on the schedule for the Lupron at the [facility] infusion at 1130 which is same day and after his fu appt with Dr Jones Lomas    Pt can go to infusion right after the office visit and does not need to wait until 1130   dtr states she understands

## 2022-02-02 ENCOUNTER — HOSPITAL ENCOUNTER (OUTPATIENT)
Dept: RADIOLOGY | Age: 82
Discharge: HOME | End: 2022-02-02
Payer: COMMERCIAL

## 2022-02-02 DIAGNOSIS — C61 PROSTATE CANCER (HCC): ICD-10-CM

## 2022-02-02 DIAGNOSIS — R97.20 PSA ELEVATION: ICD-10-CM

## 2022-02-02 PROCEDURE — 78815 PET IMAGE W/CT SKULL-THIGH: CPT

## 2022-02-02 PROCEDURE — A9588 FLUCICLOVINE F-18: HCPCS

## 2022-02-02 PROCEDURE — G1004 CDSM NDSC: HCPCS

## 2022-02-10 ENCOUNTER — TELEPHONE (OUTPATIENT)
Dept: FAMILY MEDICINE CLINIC | Facility: CLINIC | Age: 82
End: 2022-02-10

## 2022-02-10 ENCOUNTER — TRANSITIONAL CARE MANAGEMENT (OUTPATIENT)
Dept: FAMILY MEDICINE CLINIC | Facility: CLINIC | Age: 82
End: 2022-02-10

## 2022-02-10 DIAGNOSIS — I61.9 CEREBRAL HEMORRHAGE (HCC): ICD-10-CM

## 2022-02-10 DIAGNOSIS — R65.10 SIRS (SYSTEMIC INFLAMMATORY RESPONSE SYNDROME) (HCC): Primary | ICD-10-CM

## 2022-02-10 NOTE — TELEPHONE ENCOUNTER
Pt's daughter Seth Aleman called office in regards to [facility] was discharged from [facility] 02/09/22  Pt would have to come by ambulance via stretcher to his appointment  Pt's daughter is going to use [facility]   Call transfered to [facility] for TCM

## 2022-02-10 NOTE — TELEPHONE ENCOUNTER
Pt's daughter gave the office a call to schedule TCM appointment, Pt was supposed to be sentt home with homecare from [facility] daughter called [facility], Susan. [facility] care as they are who is to be following with the Pt and they told her they had no information on the Pt  Daughter stated to me she has a paper script with PT/OT and Nursing care on it  I called and spoke with [facility] and they stated they sent information to Levine, Susan. \[facility]"" care  I called and spoke with [facility] care and intake and they stated they never received any information and they will not accept a paper script  I called and spoke with the Pt's daughter and made her aware, she would like referrals sent to [facility] made Dr Wyatt Carreno aware he said yes and to call and setup for the Pt  I called and spoke with Chris Johnson in Intake and she stated his insurance requires a prior auth before services and he will need a face to face and referrals sent to them  I called Naomy louis back and made the Nursing staff aware that [facility] will not accept a paper script and this Pt now has no homecare in place, She stated they case workers are gone for the day  I did make her aware that the Daughter would like the referrals to not [facility] to [facility] and I gave the Arabella's contact information to forward to  tomorrow morning  I called and left a message for Chris Johnson at [facility] making her aware of the situation and eft my call back number for her if she was to need an additional information  I also gave the Pt's daughter a call back and left her a message to make her aware of where we are at at this point  Pt's daughter did give the office a call back and I made her aware of what is going on at this point  She verbalized understanding and appreciation for the help  I will follow up with [facility] tomorrow for the Pt

## 2022-02-14 ENCOUNTER — TELEPHONE (OUTPATIENT)
Dept: HEMATOLOGY ONCOLOGY | Facility: CLINIC | Age: 82
End: 2022-02-14

## 2022-02-14 NOTE — TELEPHONE ENCOUNTER
Spoke with pts daughter Fabian Dan, verbalized understanding of getting fathers blood drawn prior to appt with Asuncion White on 2/16/22  Will call office if anything changes

## 2022-02-14 NOTE — TELEPHONE ENCOUNTER
Patient's daughter called in regarding sending in lab results  Patient's daughter was instructed to fax lab results to [phone]

## 2022-02-15 ENCOUNTER — TELEPHONE (OUTPATIENT)
Dept: FAMILY MEDICINE CLINIC | Facility: CLINIC | Age: 82
End: 2022-02-15

## 2022-02-16 ENCOUNTER — HOSPITAL ENCOUNTER (OUTPATIENT)
Dept: INFUSION CENTER | Facility: CLINIC | Age: 82
Discharge: HOME | DRG: 057 | End: 2022-02-16
Payer: COMMERCIAL

## 2022-02-16 ENCOUNTER — TELEPHONE (OUTPATIENT)
Dept: FAMILY MEDICINE CLINIC | Facility: CLINIC | Age: 82
End: 2022-02-16

## 2022-02-16 ENCOUNTER — OFFICE VISIT (OUTPATIENT)
Dept: HEMATOLOGY ONCOLOGY | Facility: CLINIC | Age: 82
End: 2022-02-16
Payer: COMMERCIAL

## 2022-02-16 ENCOUNTER — TELEMEDICINE (OUTPATIENT)
Dept: FAMILY MEDICINE CLINIC | Facility: CLINIC | Age: 82
End: 2022-02-16
Payer: COMMERCIAL

## 2022-02-16 VITALS
DIASTOLIC BLOOD PRESSURE: 58 MMHG | OXYGEN SATURATION: 95 % | TEMPERATURE: 99 F | HEART RATE: 79 BPM | SYSTOLIC BLOOD PRESSURE: 107 MMHG

## 2022-02-16 DIAGNOSIS — E85.4 CEREBRAL AMYLOID ANGIOPATHY (HCC): ICD-10-CM

## 2022-02-16 DIAGNOSIS — C61 PROSTATE CANCER (HCC): Primary | ICD-10-CM

## 2022-02-16 DIAGNOSIS — I68.0 CEREBRAL AMYLOID ANGIOPATHY (HCC): ICD-10-CM

## 2022-02-16 DIAGNOSIS — I10 PRIMARY HYPERTENSION: ICD-10-CM

## 2022-02-16 DIAGNOSIS — R97.20 PSA ELEVATION: ICD-10-CM

## 2022-02-16 DIAGNOSIS — R73.9 HYPERGLYCEMIA: ICD-10-CM

## 2022-02-16 DIAGNOSIS — C61 PROSTATE CANCER (HCC): ICD-10-CM

## 2022-02-16 DIAGNOSIS — I61.9 CEREBRAL HEMORRHAGE (HCC): ICD-10-CM

## 2022-02-16 DIAGNOSIS — Z76.89 ENCOUNTER FOR SUPPORT AND COORDINATION OF TRANSITION OF CARE: Primary | ICD-10-CM

## 2022-02-16 PROCEDURE — 99215 OFFICE O/P EST HI 40 MIN: CPT | Performed by: FAMILY MEDICINE

## 2022-02-16 PROCEDURE — 96402 CHEMO HORMON ANTINEOPL SQ/IM: CPT

## 2022-02-16 PROCEDURE — 99214 OFFICE O/P EST MOD 30 MIN: CPT | Performed by: INTERNAL MEDICINE

## 2022-02-16 PROCEDURE — 1036F TOBACCO NON-USER: CPT | Performed by: INTERNAL MEDICINE

## 2022-02-16 PROCEDURE — 1160F RVW MEDS BY RX/DR IN RCRD: CPT | Performed by: INTERNAL MEDICINE

## 2022-02-16 RX ORDER — OXYCODONE HYDROCHLORIDE 5 MG/1
TABLET ORAL
COMMUNITY
Start: 2022-02-02 | End: 2022-02-23 | Stop reason: HOSPADM

## 2022-02-16 RX ORDER — ONDANSETRON 4 MG/1
TABLET, FILM COATED ORAL
COMMUNITY
Start: 2022-02-10 | End: 2022-02-23 | Stop reason: HOSPADM

## 2022-02-16 RX ADMIN — LEUPROLIDE ACETATE 22.5 MG: KIT SUBCUTANEOUS at 11:54

## 2022-02-16 NOTE — PROGRESS NOTES
HPI:    Follow-up visit for prostate cancer and rising PSA  Last Lupron Depot shot was in September 2021  [facility] PET-CT scan has shown disease localized in the prostate area  Patient did not want radiation previously  He is willing to see a radiation oncologist this time in addition to having Lupron Depot  Patient is here with his daughter  Patient's daughter is bilingual   Patient is on a stretcher  Patient had hemorrhagic stroke in September 2021  He has left hemiplegia  Daughter states speech has improved some  He has gastric feeding tube but lately he has been eating much better and feeding tube is not being used but flushed  Hemorrhagic stroke  was thought to be secondary to cerebral amyloid angiopathy and hypertension  Because of hemorrhagic stroke and CAA he has not been on anti-platelet or anticoagulation medications  In 2010 patient was diagnosed to have  prostate cancer and no distant metastatic disease  Nilay score was 6  Patient did not want radiation  He has been on Lupron Depot once every 3 months and last shot was in September 2021 and stroke was later  in the same month  No Lupron Depot shot since    Patient has some incontinence of urine  There has been  increase in PSA  Patient was being followed by his urologist and  Urologist was planning to do prostate biopsy but that did not happen?    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      Current Outpatient Medications:     acetaminophen (TYLENOL) 325 mg tablet, 2 tablets (650 mg total) by Per G Tube route every 6 (six) hours as needed for mild pain or headaches, Disp: 30 tablet, Rfl: 0    amLODIPine (NORVASC) 5 mg tablet, 1 tablet (5 mg total) by Per G Tube route daily (Patient not taking: Reported on 11/11/2021 ), Disp: 30 tablet, Rfl: 0    bisacodyl (Dulcolax) 10 mg suppository, Insert 10 mg into the rectum as needed for constipation, Disp: , Rfl:     doxazosin (CARDURA) 2 mg tablet, 1 tablet (2 mg total) by Per G Tube route daily, Disp: 30 tablet, Rfl: 0    ERROR: CANNOT USE RATIO BASED PRESCRIPTION MIXTURE NAMING FOR A NON-MIXTURE, 10 mL (20 mg total) by Per PEG Tube route daily, Disp: 200 mL, Rfl: 0    ferrous sulfate (KP Ferrous Sulfate) 325 (65 Fe) mg tablet, Take 325 mg by mouth daily with breakfast, Disp: , Rfl:     finasteride (PROSCAR) 5 mg tablet, Take 1 tablet (5 mg total) by mouth daily, Disp: 90 tablet, Rfl: 2    folic acid (FOLVITE) 1 mg tablet, 1 tablet (1 mg total) by Per PEG Tube route daily, Disp: 30 tablet, Rfl: 0    insulin glargine (Basaglar KwikPen) 100 units/mL injection pen, Inject under the skin, Disp: , Rfl:     insulin regular (HumuLIN R,NovoLIN R) 100 units/mL injection, Inject 0 04 mL (4 Units total) under the skin every 6 (six) hours, Disp: 10 mL, Rfl: 0    lisinopril (ZESTRIL) 5 mg tablet, Take 5 mg by mouth daily, Disp: , Rfl:     magnesium hydroxide (MILK OF MAGNESIA) 400 mg/5 mL oral suspension, Take by mouth as needed for constipation, Disp: , Rfl:     Melatonin 5 MG TABS, Take 5 mg by mouth daily at bedtime, Disp: , Rfl:     omeprazole (PriLOSEC) 20 mg delayed release capsule, Take 20 mg by mouth daily, Disp: , Rfl:     oxyCODONE-Acetaminophen  MG/5ML SOLN, Take 2 5 mg by mouth, Disp: , Rfl:     PARoxetine (PAXIL) 10 mg tablet, Take 10 mg by mouth daily, Disp: , Rfl:     sodium phosphate-biphosphate (FLEET) 7-19 g 118 mL enema, Insert 1 enema into the rectum as needed, Disp: , Rfl:     No Known Allergies    Oncology History    No history exists  ROS:  02/16/22 Reviewed 12 systems:  See symptoms in HPI  Patient's daughter helped with interpretation  Presently no other neurological, cardiac, pulmonary, GI and  symptoms other than listed in HPI  Other symptoms are in HPI  No fever, chills, bleeding, bone pains, skin rash and weight loss  Not unusually sensitive to heat or cold  No swelling of the ankles  No swollen glands  Patient is anxious  /58   Pulse 79   Temp 99 °F (37 2 °C)   SpO2 95%     Physical Exam:    Patient looked alert and he is talking freely with his daughter  Not in distress, there is  no icterus, no oral thrush, no palpable neck mass, clear lung fields to percussion and auscultation,  heart rate is regular, abdomen  soft and non tender, no palpable abdominal mass, no ascites, no edema of ankles, left hemiplegia,  no skin rash, no palpable lymphadenopathy in the neck and axillary areas, no clubbing  Performance status 4     IMAGING:      IMPRESSION:  1  Heterogeneous radiotracer uptake in the large prostate mass compatible with tumor  2   No Axumin avid metastases visualized in the pelvis, abdomen, chest, neck or osseous structures            Workstation performed: [id number]          Imaging    NM PET CT skull base to mid thigh (Order: [id number]) - 2/2/2022            Labs, Imaging, & Other studies:   All pertinent labs and imaging studies were personally reviewed  Patient had blood work on 01/25/2022 at an outside lab  Normal chemistry profile   Hemoglobin 11 6  WBC 7500 and platelet 536,317  Normal differential count  PSA increased from 15 26 in December 2021 to 19 41  Testosterone level 21    Reviewed lab results and and discussed with patient's daughter  Assessment and plan: Follow-up visit for prostate cancer and rising PSA  Last Lupron Depot shot was in September 2021  [facility] PET-CT scan has shown disease localized in the prostate area  Patient did not want radiation previously  He is willing to see a radiation oncologist this time in addition to having Lupron Depot  Patient is here with his daughter  Patient's daughter is bilingual   Patient is on a stretcher  Patient had hemorrhagic stroke in September 2021  He has left hemiplegia  Daughter states speech has improved some    He has gastric feeding tube but lately he has been eating much better and feeding tube is not being used but flushed  Hemorrhagic stroke  was thought to be secondary to cerebral amyloid angiopathy and hypertension  Because of hemorrhagic stroke and CAA he has not been on anti-platelet or anticoagulation medications  In 2010 patient was diagnosed to have  prostate cancer and no distant metastatic disease  [place] score was 6  Patient did not want radiation  He has been on Lupron Depot once every 3 months and last shot was in September 2021 and stroke was later  in the same month  No Lupron Depot shot since    Patient has some incontinence of urine  There has been  increase in PSA  Patient was being followed by his urologist and  Urologist was planning to do prostate biopsy but that did not happen?    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     Physical examination and test results are as recorded and discussed with patient's daughter and she interpreted that to his father  Plan is as above  Discussed continuation of physical therapy and do the exercises with his arms or legs especially left lower leg to prevent DVT because he will not be a candidate for blood thinner  Compression stockings  Patient will have Lupron Depot today    Patient did not want any other medication for prostate cancer  See diagnoses, orders and instructions below  Advised to continue taking  calcium with vitamin-D  All discussed in detail  Questions answered  Diet and activities as tolerated  Patient needs assistance in his care  Goal is prolongation of survival   Also discussed prevention against coronavirus    For high blood pressure and other medical problems he will follow with his primary physician  1  Prostate cancer [facility])    - Ambulatory referral to Radiation Oncology; Future  - CBC and differential; Future  - Comprehensive metabolic panel; Future  - PSA Total, Diagnostic; Future    2  PSA elevation    - Ambulatory referral to Radiation Oncology;  Future  - PSA Total, Diagnostic; Future    3  Cerebral amyloid angiopathy (HCC)      4  Cerebral hemorrhage ([address] )      Referral to Radiation  Restarting Lupron  Blood work prior to next visit in 3 months  Patient's daughter voiced understanding and agrees      Counseling / Coordination of Care       Provided counseling and support

## 2022-02-16 NOTE — PROGRESS NOTES
It was my intent to perform this visit via video technology but the patient was not able to do a video connection so the visit was completed via audio telephone only  Virtual TCM Visit:    Verification of patient location:    Patient is located in the following state in which I hold an active license [place]        Assessment/Plan:        Problem List Items Addressed This Visit        Cardiovascular and Mediastinum    Hypertension    Cerebral amyloid angiopathy (HCC)       Nervous and Auditory    Cerebral hemorrhage (HCC)       Genitourinary    Prostate cancer ([address] )       Other    Hyperglycemia    Relevant Orders    Glucometer    Glucometer test strips    PSA elevation      Other Visit Diagnoses     Encounter for support and coordination of transition of care    -  Primary             Reason for visit is TCM    Encounter provider Severo Abarca DO       Provider located at [address] [place] [id number] [phone]      Recent Visits  Date Type Provider Dept   02/15/22 Telephone [facility] Primary Care   02/10/22 Telephone [facility] Primary Care   02/10/22 Telephone Semaj Vuong, [address] [address] [place] [address] Primary Care   Showing recent visits within past 7 days and meeting all other requirements  Today's Visits  Date Type Provider Dept   02/16/22 Telemedicine Severo Abarca, [address] Primary Care   Showing today's visits and meeting all other requirements  Future Appointments  No visits were found meeting these conditions  Showing future appointments within next 150 days and meeting all other requirements       After connecting through [employer], the patient was identified by name and date of birth  Koki Del Toro was informed that this is a telemedicine visit and that the visit is being conducted through Telephone  My office door was closed  No one else was in the room    He acknowledged consent and understanding of privacy and security of the video platform  The patient has agreed to participate and understands they can discontinue the visit at any time  Patient is aware this is a billable service  Subjective:     Patient ID: Sanya Morgan is a 78 y o  male  Here for Virtual TCM and daughter states patient saw heme/onc today  He was advised to get radiation treatment but daughter would like to discuss this with family members first  Also patient is having decreased appetite and is wondering if he needs peg tubes again  He is having less of an appetite recently as well as patient as per daughter may be afraid of eating as it may cause reflux  Medical records reviewed:    Hospital Course:   Nalini Rincon a 78 y  o  male with past medical history of hypertension and prostate cancer patient who originally presented to the hospital on 9/16/2021 due to left upper and lower extremity weakness, left facial droop, right gaze preference and lethargy   Patient was very functional before coming to the hospital  [facility] initial CT head showed a right parietal IPH with extravasation into the right temporal lobe, SAH, surrounding edema   MRI findings were consistent with amyloid disease likely etiology of his IPH    Repeat CT head on 09/22 showed no new hemorrhage, moderate chronic angiopathy   The mass effect and edema were stable   Repeat CT head 10 days later was stable   As per neuro surgery, patient has plan for repeat CT head likely on 10/18 to monitor for delay hydrocephalus   No major interventions were done for his IPH        During the hospital course, patient was found to have anemia and was started on folate and iron supplements   Patient also had dysphagia   Family meeting was held with palliative and decision was made to go with placement of PEG tube   Patient was also made level 3 DNR DNI      Patient was also meeting SIRS criteria with fever of 101 2   With elevated WBC count and mild procalcitonin   Source was thought to be likely pulmonary versus GI   Patient was started on ceftriaxone in setting of possible UTI as a source    Patient completed course of antibiotics with Zosyn and Rocephin   Patient did not have any fever and labs stabilized after completing the course of antibiotics   Later on, the etiology of his fever was thought to be secondary to drug induced versus central fever versus atelectasis      On the day of discharge on 10/12, patient was tolerating his PEG tube diet very well   He still has persistent left-sided weakness and is also normal   Patient will be discharged to [facility] for rehab   Family was at the bedside and was updated            Review of Systems   Constitutional: Positive for appetite change (less appetite recently)  HENT: Negative  Eyes: Negative  Respiratory: Negative  Cardiovascular: Negative  Gastrointestinal: Negative  Endocrine: Negative  Genitourinary: Negative  Musculoskeletal: Negative  Skin: Negative  Allergic/Immunologic: Negative  Neurological:        Patient nonverbal, severe left upper and lower extremity weakness with decreased strength, left facial droop  Hematological: Negative  Psychiatric/Behavioral: Negative  Objective: There were no vitals filed for this visit  Physical Exam  Pulmonary:      Effort: Pulmonary effort is normal  No respiratory distress  Neurological:      Mental Status: He is alert and oriented to person, place, and time  Comments: Patient nonverbal, severe left upper and lower extremity weakness with strength of 0-1/5, left facial droop  Psychiatric:         Mood and Affect: Mood normal          Behavior: Behavior normal          Thought Content: Thought content normal          Judgment: Judgment normal              Transitional Care Management Review:  Samina Coelho is a 78 y o  male here for TCM follow up       During the TCM phone call patient stated:    TCM Call (since 1/16/2022)     Date and time call was made  2/10/2022  2:52 PM    Hospital care reviewed  Records reviewed        Patient [facility] hospitial[facility] at  [facility]        Date of Admission  09/16/21  10/12/2021-2/9/2021    Date of discharge  10/12/21    Diagnosis  cerebral hemorrhage     Disposition  Home    Were the patients medications reviewed and updated  No    Current Symptoms  None      TCM Call (since 1/16/2022)     Post hospital issues  --  bedbound no movement on L side     Should patient be enrolled in anticoag monitoring? No    Patients specialists  Other (comment)    Other specialists names  oncologist     Did you obtain your prescribed medications  Yes    Do you need help managing your prescriptions or medications  Yes    Why type of assitance do you need  Pt is now bedbound and unable to manage medications     Is transportation to your appointment needed  Yes    Specify why  Pt is unable to drive he is bedbound and unable to use L side     I have advised the patient to call PCP with any new or worsening symptoms  [facility]    The type of support provided  Physical    Are you recieving home care services  Yes    Types of home care services  Home PT; Nurse visit; Other (comment)    Comment  OT          I spent 25 minutes with the patient during this visit  Saroj Gave, DO      VIRTUAL VISIT DISCLAIMER    [place] Maryann verbally agrees to participate in [employer]  Pt is aware that [employer] could be limited without vital signs or the ability to perform a full hands-on physical Yan Warner understands he or the provider may request at any time to terminate the video visit and request the patient to seek care or treatment in person

## 2022-02-16 NOTE — PROGRESS NOTES
Patient arrived to unit without complaint  Patient tolerated Eligard SC to upper right buttocks without incident  AVS declined and patient's daughter aware of next appointment  Patient left with squad in stable condition

## 2022-02-16 NOTE — PATIENT INSTRUCTIONS
[phone] Jimenez Art 02/09/22  daughter has poss records  Has some acid reflux and has been having poor appetite and will rec supplementing with ensure or boost and to encourage appetite  Will prescribe glucometer and strips to check blood sugar periodically for hyperglycemia  patient will f-up with Heme/onc as directed in terms of radiation treatment, daughter stated she would like to discuss treatment with family first on whether they would like patient to have radiation treatment  Medical records reviewed:        HPI:  Patient is here with his daughter  Patient's daughter is bilingual   Patient has been at rehabilitation [place] post stroke in September 2021  He has left hemiplegia  Daughter states speech has improved  He has gastric feeding tube but lately he has been eating much better and feeding tube is not being used but flushed     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  Because of hemorrhagic stroke and CAA he has not been on anti-platelet or anticoagulation medications  In 2010 patient was diagnosed to have  prostate cancer and no distant metastatic disease  [place] score was 6  Patient did not want radiation  He has been on Lupron Depot once every 3 months and last shot was in September 2021 and stroke after that in the same month     Patient has some incontinence of urine  There has been some increase in PSA  Patient was being followed by his urologist and  Urologist was planning to do prostate biopsy    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      Current Outpatient Medications:     acetaminophen (TYLENOL) 325 mg tablet, 2 tablets (650 mg total) by Per G Tube route every 6 (six) hours as needed for mild pain or headaches, Disp: 30 tablet, Rfl: 0    amLODIPine (NORVASC) 5 mg tablet, 1 tablet (5 mg total) by Per G Tube route daily (Patient not taking: Reported on 11/11/2021 ), Disp: 30 tablet, Rfl: 0    bisacodyl (Dulcolax) 10 mg suppository, Insert 10 mg into the rectum as needed for constipation, Disp: , Rfl:     doxazosin (CARDURA) 2 mg tablet, 1 tablet (2 mg total) by Per G Tube route daily, Disp: 30 tablet, Rfl: 0    ERROR: CANNOT USE RATIO BASED PRESCRIPTION MIXTURE NAMING FOR A NON-MIXTURE, 10 mL (20 mg total) by Per PEG Tube route daily, Disp: 200 mL, Rfl: 0    ferrous sulfate (KP Ferrous Sulfate) 325 (65 Fe) mg tablet, Take 325 mg by mouth daily with breakfast, Disp: , Rfl:     finasteride (PROSCAR) 5 mg tablet, Take 1 tablet (5 mg total) by mouth daily, Disp: 90 tablet, Rfl: 2    folic acid (FOLVITE) 1 mg tablet, 1 tablet (1 mg total) by Per PEG Tube route daily, Disp: 30 tablet, Rfl: 0    insulin glargine (Basaglar KwikPen) 100 units/mL injection pen, Inject under the skin, Disp: , Rfl:     insulin regular (HumuLIN R,NovoLIN R) 100 units/mL injection, Inject 0 04 mL (4 Units total) under the skin every 6 (six) hours, Disp: 10 mL, Rfl: 0    lisinopril (ZESTRIL) 5 mg tablet, Take 5 mg by mouth daily, Disp: , Rfl:     magnesium hydroxide (MILK OF MAGNESIA) 400 mg/5 mL oral suspension, Take by mouth as needed for constipation, Disp: , Rfl:     Melatonin 5 MG TABS, Take 5 mg by mouth daily at bedtime, Disp: , Rfl:     omeprazole (PriLOSEC) 20 mg delayed release capsule, Take 20 mg by mouth daily, Disp: , Rfl:     oxyCODONE-Acetaminophen  MG/5ML SOLN, Take 2 5 mg by mouth, Disp: , Rfl:     PARoxetine (PAXIL) 10 mg tablet, Take 10 mg by mouth daily, Disp: , Rfl:     sodium phosphate-biphosphate (FLEET) 7-19 g 118 mL enema, Insert 1 enema into the rectum as needed, Disp: , Rfl:      No Known Allergies         Oncology History     No history exists          ROS:  02/16/22 Reviewed 13 systems:  See symptoms in HPI  Patient's daughter helped with interpretation  Presently no other neurological, cardiac, pulmonary, GI and  symptoms    Other symptoms are in HPI  No fever, chills, bleeding, bone pains, skin rash and weight loss  Not unusually sensitive to heat or cold  No swelling of the ankles  No swollen glands  Patient is anxious  Other symptoms are in HPI           /58   Pulse 79   Temp 99 °F (37 2 °C)   SpO2 95%      Physical Exam:    Patient looked alert and he was talking freely with his daughter  Not in distress,  no icterus, no oral thrush, no palpable neck mass, clear lung fields, regular heart rate, abdomen  soft and non tender, no palpable abdominal mass, no ascites, no edema of ankles, left hemiplegia,  no skin rash, no palpable lymphadenopathy, no clubbing  Performance status 4     IMAGING:  IMPRESSION:     1  PI-RADSv2 Category 1 - Very low (clinically significant cancer is highly unlikely to be present)  2  Calculated prostate volume of 61 mL   3  3 6 x 9 2 cm subcutaneous cystic structure overlying the sacrum      Note: Clinically significant cancer is defined on pathology/histology as [place] score greater than or equal to 7, and/or volume of greater than or equal to 0 5 mL, and/or extraprostatic extension            Workstation performed: [id number]      Imaging      MRI prostate multiparametric wo w contrast (Order: [id number]) - 3/13/2020   IMPRESSION:     1   No scintigraphic evidence of osseous metastasis           Workstation performed: [id number]      Imaging      NM bone scan whole body (Order: [id number]) - 2/17/2020      IMPRESSION:     1   No evidence of metastasis within the chest, abdomen, or pelvis  2   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  3   Diverticulosis without evidence of diverticulitis  4   Thickening of the bladder likely due to chronic bladder outlet obstruction due to enlarged prostate, correlate with urinalysis to exclude infection    5   Large subcutaneous fluid density cystic structure along the midline lower back measuring 9 2 x 3 8 x 6 6 cm, nonspecific however may represent a sebaceous cyst, correlate clinically  6   Other findings as above      Workstation performed: [id number]      Imaging      CT chest abdomen pelvis w contrast (Order: [id number]) - 2/17/2020         Labs, Imaging, & Other studies:   All pertinent labs and imaging studies were personally reviewed        Reviewed lab results and and discussed with patient's daughter      Assessment and plan:   Patient is here with his daughter  Patient's daughter is bilingual   Patient has been at rehabilitation center post stroke in September 2021  He has left hemiplegia  Daughter states speech has improved  He has gastric feeding tube but lately he has been eating much better and feeding tube is not being used but flushed     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  Because of hemorrhagic stroke and CAA he has not been on anti-platelet or anticoagulation medications  In 2010 patient was diagnosed to have  prostate cancer and no distant metastatic disease  [place] score was 6  Patient did not want radiation  He has been on Lupron Depot once every 3 months and last shot was in September 2021 and stroke after that in the same month     Patient has some incontinence of urine  There has been some increase in PSA  Patient was being followed by his urologist and  Urologist was planning to do prostate biopsy  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          Physical examination and test results are as recorded and discussed with patient's daughter and she interpreted that to his father  Patient will have re-evaluation of his prostate cancer  Obtained informed consent again for Lupron because of risk of thromboembolic phenomena with Lupron and discuss that with them and other side effects  Patient's stroke was hemorrhagic and not thrombotic    Discussed continuation of physical therapy and do the exercises with his arms or legs especially left lower leg to prevent DVT because he will not be a candidate for blood thinner  Compression stockings  They would like to get started on Lupron  Patient did not want any other medication for prostate cancer  After evaluation additional accommodations will be made  See diagnoses, orders and instructions below  Advised to continue taking  calcium with vitamin-D  Daughter mentioned that patient will be going home soon and she will be taking care of him  All discussed in detail  Questions answered  Diet and activities as tolerated  Patient needs assistance in his care  Goal is prolongation of survival   Also discussed prevention against coronavirus    For high blood pressure and other medical problems he will follow with his primary physician      1  Prostate cancer [facility])    - Ambulatory referral to Radiation Oncology; Future  - CBC and differential; Future  - Comprehensive metabolic panel; Future  - PSA Total, Diagnostic; Future     2  PSA elevation    - Ambulatory referral to Radiation Oncology; Future  - PSA Total, Diagnostic; Future     3  Cerebral amyloid angiopathy (HCC)       4  Cerebral hemorrhage (HCC)      Heme/onc noted today:     Assessment and plan:   Patient is here with his daughter  Patient's daughter is bilingual   Patient has been at rehabilitation center post stroke in September 2021  He has left hemiplegia  Daughter states speech has improved  He has gastric feeding tube but lately he has been eating much better and feeding tube is not being used but flushed     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  Because of hemorrhagic stroke and CAA he has not been on anti-platelet or anticoagulation medications  In 2010 patient was diagnosed to have  prostate cancer and no distant metastatic disease  [place] score was 6  Patient did not want radiation    He has been on Lupron Depot once every 3 months and last shot was in September 2021 and stroke after that in the same month     Patient has some incontinence of urine  There has been some increase in PSA  Patient was being followed by his urologist and  Urologist was planning to do prostate biopsy  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          Physical examination and test results are as recorded and discussed with patient's daughter and she interpreted that to his father  Patient will have re-evaluation of his prostate cancer  Obtained informed consent again for Lupron because of risk of thromboembolic phenomena with Lupron and discuss that with them and other side effects  Patient's stroke was hemorrhagic and not thrombotic  Discussed continuation of physical therapy and do the exercises with his arms or legs especially left lower leg to prevent DVT because he will not be a candidate for blood thinner  Compression stockings  They would like to get started on Lupron  Patient did not want any other medication for prostate cancer  After evaluation additional accommodations will be made  See diagnoses, orders and instructions below  Advised to continue taking  calcium with vitamin-D  Daughter mentioned that patient will be going home soon and she will be taking care of him  All discussed in detail  Questions answered  Diet and activities as tolerated  Patient needs assistance in his care  Goal is prolongation of survival   Also discussed prevention against coronavirus    For high blood pressure and other medical problems he will follow with his primary physician

## 2022-02-16 NOTE — TELEPHONE ENCOUNTER
I called Dana Cervantes' daughter Marilou Norris in regards to which pharmacy would she like her fathers scripts for his glucometer and test strips faxed/I will fax the scripts to the [employer] at [facility] as Marilou Norris requested

## 2022-02-17 ENCOUNTER — APPOINTMENT (EMERGENCY)
Dept: RADIOLOGY | Facility: HOSPITAL | Age: 82
DRG: 057 | End: 2022-02-17
Payer: COMMERCIAL

## 2022-02-17 ENCOUNTER — TELEPHONE (OUTPATIENT)
Dept: FAMILY MEDICINE CLINIC | Facility: CLINIC | Age: 82
End: 2022-02-17

## 2022-02-17 ENCOUNTER — APPOINTMENT (EMERGENCY)
Dept: CT IMAGING | Facility: HOSPITAL | Age: 82
DRG: 057 | End: 2022-02-17
Payer: COMMERCIAL

## 2022-02-17 ENCOUNTER — HOSPITAL ENCOUNTER (INPATIENT)
Facility: HOSPITAL | Age: 82
LOS: 6 days | Discharge: HOME HEALTH | DRG: 057 | End: 2022-02-23
Attending: EMERGENCY MEDICINE | Admitting: INTERNAL MEDICINE
Payer: COMMERCIAL

## 2022-02-17 DIAGNOSIS — Z79.4 TYPE 2 DIABETES MELLITUS WITHOUT COMPLICATION, WITH LONG-TERM CURRENT USE OF INSULIN (HCC): ICD-10-CM

## 2022-02-17 DIAGNOSIS — E11.9 TYPE 2 DIABETES MELLITUS WITHOUT COMPLICATION, WITH LONG-TERM CURRENT USE OF INSULIN (HCC): ICD-10-CM

## 2022-02-17 DIAGNOSIS — Z86.79 HISTORY OF CEREBRAL HEMORRHAGE: ICD-10-CM

## 2022-02-17 DIAGNOSIS — R62.7 FAILURE TO THRIVE IN ADULT: ICD-10-CM

## 2022-02-17 DIAGNOSIS — R63.8 DECREASED ORAL INTAKE: Primary | ICD-10-CM

## 2022-02-17 DIAGNOSIS — R13.10 DYSPHAGIA, UNSPECIFIED TYPE: ICD-10-CM

## 2022-02-17 DIAGNOSIS — Z71.89 GOALS OF CARE, COUNSELING/DISCUSSION: ICD-10-CM

## 2022-02-17 LAB
ALBUMIN SERPL BCP-MCNC: 2.9 G/DL (ref 3.5–5)
ALP SERPL-CCNC: 37 U/L (ref 46–116)
ALT SERPL W P-5'-P-CCNC: 12 U/L (ref 12–78)
ANION GAP SERPL CALCULATED.3IONS-SCNC: 10 MMOL/L (ref 4–13)
AST SERPL W P-5'-P-CCNC: 14 U/L (ref 5–45)
BASOPHILS # BLD AUTO: 0.05 THOUSANDS/ΜL (ref 0–0.1)
BASOPHILS NFR BLD AUTO: 0 % (ref 0–1)
BILIRUB SERPL-MCNC: 0.49 MG/DL (ref 0.2–1)
BUN SERPL-MCNC: 34 MG/DL (ref 5–25)
CALCIUM ALBUM COR SERPL-MCNC: 10 MG/DL (ref 8.3–10.1)
CALCIUM SERPL-MCNC: 9.1 MG/DL (ref 8.3–10.1)
CARDIAC TROPONIN I PNL SERPL HS: 6 NG/L
CHLORIDE SERPL-SCNC: 100 MMOL/L (ref 100–108)
CO2 SERPL-SCNC: 27 MMOL/L (ref 21–32)
CREAT SERPL-MCNC: 0.78 MG/DL (ref 0.6–1.3)
EOSINOPHIL # BLD AUTO: 0.02 THOUSAND/ΜL (ref 0–0.61)
EOSINOPHIL NFR BLD AUTO: 0 % (ref 0–6)
ERYTHROCYTE [DISTWIDTH] IN BLOOD BY AUTOMATED COUNT: 13.3 % (ref 11.6–15.1)
GFR SERPL CREATININE-BSD FRML MDRD: 83 ML/MIN/1.73SQ M
GLUCOSE SERPL-MCNC: 116 MG/DL (ref 65–140)
HCT VFR BLD AUTO: 35.1 % (ref 36.5–49.3)
HGB BLD-MCNC: 11.3 G/DL (ref 12–17)
IMM GRANULOCYTES # BLD AUTO: 0.07 THOUSAND/UL (ref 0–0.2)
IMM GRANULOCYTES NFR BLD AUTO: 1 % (ref 0–2)
LIPASE SERPL-CCNC: 110 U/L (ref 73–393)
LYMPHOCYTES # BLD AUTO: 1.6 THOUSANDS/ΜL (ref 0.6–4.47)
LYMPHOCYTES NFR BLD AUTO: 12 % (ref 14–44)
MCH RBC QN AUTO: 31.6 PG (ref 26.8–34.3)
MCHC RBC AUTO-ENTMCNC: 32.2 G/DL (ref 31.4–37.4)
MCV RBC AUTO: 98 FL (ref 82–98)
MONOCYTES # BLD AUTO: 1.22 THOUSAND/ΜL (ref 0.17–1.22)
MONOCYTES NFR BLD AUTO: 9 % (ref 4–12)
NEUTROPHILS # BLD AUTO: 10.64 THOUSANDS/ΜL (ref 1.85–7.62)
NEUTS SEG NFR BLD AUTO: 78 % (ref 43–75)
NRBC BLD AUTO-RTO: 0 /100 WBCS
PLATELET # BLD AUTO: 342 THOUSANDS/UL (ref 149–390)
PMV BLD AUTO: 10 FL (ref 8.9–12.7)
POTASSIUM SERPL-SCNC: 4.7 MMOL/L (ref 3.5–5.3)
PROT SERPL-MCNC: 7.2 G/DL (ref 6.4–8.2)
RBC # BLD AUTO: 3.58 MILLION/UL (ref 3.88–5.62)
SODIUM SERPL-SCNC: 137 MMOL/L (ref 136–145)
WBC # BLD AUTO: 13.6 THOUSAND/UL (ref 4.31–10.16)

## 2022-02-17 PROCEDURE — 96361 HYDRATE IV INFUSION ADD-ON: CPT

## 2022-02-17 PROCEDURE — 99285 EMERGENCY DEPT VISIT HI MDM: CPT | Performed by: PHYSICIAN ASSISTANT

## 2022-02-17 PROCEDURE — 36415 COLL VENOUS BLD VENIPUNCTURE: CPT | Performed by: PHYSICIAN ASSISTANT

## 2022-02-17 PROCEDURE — 80053 COMPREHEN METABOLIC PANEL: CPT | Performed by: PHYSICIAN ASSISTANT

## 2022-02-17 PROCEDURE — 99285 EMERGENCY DEPT VISIT HI MDM: CPT

## 2022-02-17 PROCEDURE — 85025 COMPLETE CBC W/AUTO DIFF WBC: CPT | Performed by: PHYSICIAN ASSISTANT

## 2022-02-17 PROCEDURE — G1004 CDSM NDSC: HCPCS

## 2022-02-17 PROCEDURE — 74177 CT ABD & PELVIS W/CONTRAST: CPT

## 2022-02-17 PROCEDURE — 84484 ASSAY OF TROPONIN QUANT: CPT | Performed by: PHYSICIAN ASSISTANT

## 2022-02-17 PROCEDURE — 99223 1ST HOSP IP/OBS HIGH 75: CPT | Performed by: NURSE PRACTITIONER

## 2022-02-17 PROCEDURE — 71045 X-RAY EXAM CHEST 1 VIEW: CPT

## 2022-02-17 PROCEDURE — 96360 HYDRATION IV INFUSION INIT: CPT

## 2022-02-17 PROCEDURE — 83690 ASSAY OF LIPASE: CPT | Performed by: PHYSICIAN ASSISTANT

## 2022-02-17 RX ADMIN — IOHEXOL 100 ML: 350 INJECTION, SOLUTION INTRAVENOUS at 20:41

## 2022-02-17 RX ADMIN — SODIUM CHLORIDE 500 ML: 0.9 INJECTION, SOLUTION INTRAVENOUS at 20:04

## 2022-02-17 NOTE — TELEPHONE ENCOUNTER
AVIS LIU called today  1st visit with pt in the home  She plans on seeing him for OT for the next 2 weeks  PT is complaining of nausea, stomach discomfort, burning in chest, acid reflux  This has been happening since Tuesday this week  2/15 he did throw up, he is not eating or drinking  He will eat slightly here and there since Tuesday but not eating well or much of appetite  This morning they noticed has terrible breath, described as smelling like a bowel movement in his mouth  He does not eat as he doesn't want to swallow  He is also not having a bowel movement as well  His BP is 98/58, consistently has been this way normally running 100's/50's  O2 is 95%, respirations is 20 breaths per minute  Family did say since stroke that this seems to be his normal, still concerning  The nurse is supposed to go there tomorrow  PT does not want to go to the ER  Not in distress, pt is just uncomfortable and rates pain 8 out of 10  I spoke with Evin Barnard OT, [phone]  PLEASE SEND BACK TO CLINICAL STAFF

## 2022-02-17 NOTE — TELEPHONE ENCOUNTER
Spoke with daughter Miley Hatch she agrees with plan of action she states she will discuss recommendations with brothers and see who can take patient for ER evaluation  Made her aware to please notify office of which ER patient will be going

## 2022-02-18 LAB
2HR DELTA HS TROPONIN: 0 NG/L
ANION GAP SERPL CALCULATED.3IONS-SCNC: 11 MMOL/L (ref 4–13)
BASOPHILS # BLD AUTO: 0.02 THOUSANDS/ΜL (ref 0–0.1)
BASOPHILS NFR BLD AUTO: 0 % (ref 0–1)
BUN SERPL-MCNC: 29 MG/DL (ref 5–25)
CALCIUM SERPL-MCNC: 9.2 MG/DL (ref 8.3–10.1)
CARDIAC TROPONIN I PNL SERPL HS: 6 NG/L
CHLORIDE SERPL-SCNC: 100 MMOL/L (ref 100–108)
CO2 SERPL-SCNC: 25 MMOL/L (ref 21–32)
CREAT SERPL-MCNC: 0.64 MG/DL (ref 0.6–1.3)
EOSINOPHIL # BLD AUTO: 0.02 THOUSAND/ΜL (ref 0–0.61)
EOSINOPHIL NFR BLD AUTO: 0 % (ref 0–6)
ERYTHROCYTE [DISTWIDTH] IN BLOOD BY AUTOMATED COUNT: 13.3 % (ref 11.6–15.1)
GFR SERPL CREATININE-BSD FRML MDRD: 90 ML/MIN/1.73SQ M
GLUCOSE SERPL-MCNC: 101 MG/DL (ref 65–140)
GLUCOSE SERPL-MCNC: 110 MG/DL (ref 65–140)
GLUCOSE SERPL-MCNC: 112 MG/DL (ref 65–140)
GLUCOSE SERPL-MCNC: 116 MG/DL (ref 65–140)
GLUCOSE SERPL-MCNC: 137 MG/DL (ref 65–140)
GLUCOSE SERPL-MCNC: 222 MG/DL (ref 65–140)
GLUCOSE SERPL-MCNC: 92 MG/DL (ref 65–140)
HCT VFR BLD AUTO: 32.6 % (ref 36.5–49.3)
HGB BLD-MCNC: 10.6 G/DL (ref 12–17)
IMM GRANULOCYTES # BLD AUTO: 0.05 THOUSAND/UL (ref 0–0.2)
IMM GRANULOCYTES NFR BLD AUTO: 0 % (ref 0–2)
LYMPHOCYTES # BLD AUTO: 1.54 THOUSANDS/ΜL (ref 0.6–4.47)
LYMPHOCYTES NFR BLD AUTO: 14 % (ref 14–44)
MCH RBC QN AUTO: 31.6 PG (ref 26.8–34.3)
MCHC RBC AUTO-ENTMCNC: 32.5 G/DL (ref 31.4–37.4)
MCV RBC AUTO: 97 FL (ref 82–98)
MONOCYTES # BLD AUTO: 1.12 THOUSAND/ΜL (ref 0.17–1.22)
MONOCYTES NFR BLD AUTO: 10 % (ref 4–12)
NEUTROPHILS # BLD AUTO: 8.49 THOUSANDS/ΜL (ref 1.85–7.62)
NEUTS SEG NFR BLD AUTO: 76 % (ref 43–75)
NRBC BLD AUTO-RTO: 0 /100 WBCS
PLATELET # BLD AUTO: 313 THOUSANDS/UL (ref 149–390)
PMV BLD AUTO: 9.6 FL (ref 8.9–12.7)
POTASSIUM SERPL-SCNC: 4 MMOL/L (ref 3.5–5.3)
RBC # BLD AUTO: 3.35 MILLION/UL (ref 3.88–5.62)
SODIUM SERPL-SCNC: 136 MMOL/L (ref 136–145)
WBC # BLD AUTO: 11.24 THOUSAND/UL (ref 4.31–10.16)

## 2022-02-18 PROCEDURE — 82948 REAGENT STRIP/BLOOD GLUCOSE: CPT

## 2022-02-18 PROCEDURE — 36415 COLL VENOUS BLD VENIPUNCTURE: CPT | Performed by: PHYSICIAN ASSISTANT

## 2022-02-18 PROCEDURE — 84484 ASSAY OF TROPONIN QUANT: CPT | Performed by: PHYSICIAN ASSISTANT

## 2022-02-18 PROCEDURE — 99232 SBSQ HOSP IP/OBS MODERATE 35: CPT | Performed by: INTERNAL MEDICINE

## 2022-02-18 PROCEDURE — 99222 1ST HOSP IP/OBS MODERATE 55: CPT | Performed by: INTERNAL MEDICINE

## 2022-02-18 PROCEDURE — 80048 BASIC METABOLIC PNL TOTAL CA: CPT | Performed by: NURSE PRACTITIONER

## 2022-02-18 PROCEDURE — 85025 COMPLETE CBC W/AUTO DIFF WBC: CPT | Performed by: NURSE PRACTITIONER

## 2022-02-18 PROCEDURE — 92610 EVALUATE SWALLOWING FUNCTION: CPT

## 2022-02-18 RX ORDER — AMLODIPINE BESYLATE 5 MG/1
5 TABLET ORAL DAILY
Status: DISCONTINUED | OUTPATIENT
Start: 2022-02-18 | End: 2022-02-23 | Stop reason: HOSPADM

## 2022-02-18 RX ORDER — FINASTERIDE 5 MG/1
5 TABLET, FILM COATED ORAL DAILY
Status: DISCONTINUED | OUTPATIENT
Start: 2022-02-18 | End: 2022-02-23 | Stop reason: HOSPADM

## 2022-02-18 RX ORDER — MAGNESIUM HYDROXIDE/ALUMINUM HYDROXICE/SIMETHICONE 120; 1200; 1200 MG/30ML; MG/30ML; MG/30ML
30 SUSPENSION ORAL EVERY 6 HOURS PRN
Status: DISCONTINUED | OUTPATIENT
Start: 2022-02-18 | End: 2022-02-23 | Stop reason: HOSPADM

## 2022-02-18 RX ORDER — LISINOPRIL 5 MG/1
5 TABLET ORAL DAILY
Status: DISCONTINUED | OUTPATIENT
Start: 2022-02-18 | End: 2022-02-23 | Stop reason: HOSPADM

## 2022-02-18 RX ORDER — ONDANSETRON 2 MG/ML
4 INJECTION INTRAMUSCULAR; INTRAVENOUS EVERY 6 HOURS PRN
Status: DISCONTINUED | OUTPATIENT
Start: 2022-02-18 | End: 2022-02-20

## 2022-02-18 RX ORDER — DEXTROSE AND SODIUM CHLORIDE 5; .9 G/100ML; G/100ML
75 INJECTION, SOLUTION INTRAVENOUS CONTINUOUS
Status: DISCONTINUED | OUTPATIENT
Start: 2022-02-18 | End: 2022-02-20

## 2022-02-18 RX ORDER — BISACODYL 10 MG
10 SUPPOSITORY, RECTAL RECTAL DAILY PRN
Status: DISCONTINUED | OUTPATIENT
Start: 2022-02-18 | End: 2022-02-23 | Stop reason: HOSPADM

## 2022-02-18 RX ORDER — DOXAZOSIN 2 MG/1
2 TABLET ORAL DAILY
Status: DISCONTINUED | OUTPATIENT
Start: 2022-02-18 | End: 2022-02-23 | Stop reason: HOSPADM

## 2022-02-18 RX ORDER — SIMETHICONE 20 MG/.3ML
40 EMULSION ORAL EVERY 6 HOURS PRN
Status: DISCONTINUED | OUTPATIENT
Start: 2022-02-18 | End: 2022-02-20

## 2022-02-18 RX ORDER — FERROUS SULFATE 300 MG/5ML
300 LIQUID (ML) ORAL
Status: DISCONTINUED | OUTPATIENT
Start: 2022-02-18 | End: 2022-02-18

## 2022-02-18 RX ORDER — ACETAMINOPHEN 160 MG/5ML
650 SUSPENSION, ORAL (FINAL DOSE FORM) ORAL EVERY 4 HOURS PRN
Status: DISCONTINUED | OUTPATIENT
Start: 2022-02-18 | End: 2022-02-23 | Stop reason: HOSPADM

## 2022-02-18 RX ORDER — FOLIC ACID 1 MG/1
1 TABLET ORAL DAILY
Status: DISCONTINUED | OUTPATIENT
Start: 2022-02-18 | End: 2022-02-23 | Stop reason: HOSPADM

## 2022-02-18 RX ADMIN — AMLODIPINE BESYLATE 5 MG: 5 TABLET ORAL at 10:23

## 2022-02-18 RX ADMIN — LISINOPRIL 5 MG: 5 TABLET ORAL at 10:23

## 2022-02-18 RX ADMIN — FOLIC ACID 1 MG: 1 TABLET ORAL at 10:23

## 2022-02-18 RX ADMIN — DEXTROSE AND SODIUM CHLORIDE 75 ML/HR: 5; .9 INJECTION, SOLUTION INTRAVENOUS at 04:03

## 2022-02-18 RX ADMIN — Medication 20 MG: at 10:23

## 2022-02-18 RX ADMIN — FINASTERIDE 5 MG: 5 TABLET, FILM COATED ORAL at 10:23

## 2022-02-18 RX ADMIN — DEXTROSE AND SODIUM CHLORIDE 75 ML/HR: 5; .9 INJECTION, SOLUTION INTRAVENOUS at 20:00

## 2022-02-18 RX ADMIN — DOXAZOSIN 2 MG: 2 TABLET ORAL at 10:23

## 2022-02-18 RX ADMIN — INSULIN LISPRO 1 UNITS: 100 INJECTION, SOLUTION INTRAVENOUS; SUBCUTANEOUS at 23:56

## 2022-02-18 NOTE — PROGRESS NOTES
Progress Note - Phi Aguilar 78 y o  male MRN: [id number]    Unit/Bed#: E5 -01 Encounter: [id number]      Subjective: The patient appears comfortable  He denies pain, shortness of breath, or nausea  Physical Exam:   Temp:  [98 3 °F (36 8 °C)] 98 3 °F (36 8 °C)  HR:  [72-79] 74  Resp:  [18-20] 18  BP: ()/(55-70) 114/60    Gen:  Well-developed, frail, in no distress  Neck:  Supple  No lymphadenopathy, goiter, or bruit  Heart:  Regular rhythm  I heard no murmur, gallop, or rub  Lungs:  Clear to auscultation percussion  No wheezing, rales, or rhonchi  Abd:  Soft with active bowel sounds  Peg tube is noted  There is no tenderness  Extremities:  No clubbing, cyanosis, or edema  No calf tenderness  Neuro:  Alert interactive  Left hemiparesis is noted  Skin:  Warm and dry  LABS:   CBC:   Lab Results   Component Value Date    WBC 11 24 (H) 02/18/2022    HGB 10 6 (L) 02/18/2022    HCT 32 6 (L) 02/18/2022    MCV 97 02/18/2022     02/18/2022    MCH 31 6 02/18/2022    MCHC 32 5 02/18/2022    RDW 13 3 02/18/2022    MPV 9 6 02/18/2022    NRBC 0 02/18/2022   , CMP:   Lab Results   Component Value Date    SODIUM 136 02/18/2022    K 4 0 02/18/2022     02/18/2022    CO2 25 02/18/2022    BUN 29 (H) 02/18/2022    CREATININE 0 64 02/18/2022    CALCIUM 9 2 02/18/2022    AST 14 02/17/2022    ALT 12 02/17/2022    ALKPHOS 37 (L) 02/17/2022    EGFR 90 02/18/2022           Assessment/Plan:  1  Anorexia  2  Dysphagia secondary to stroke  3  History of cerebral hemorrhage, September 2021  4  Prostate cancer  5  Anemia    The patient is currently on pantoprazole therapy for recent GI symptoms  He is going to be evaluated by speech therapy for his swallowing  In the interim, tube feeding will be resumed since he has a PEG tube in place already  Palliative care evaluation has been requested to help to define goals of care      VTE Pharmacologic Prophylaxis: Reason for no pharmacologic prophylaxis History of cerebral hemorrhage    VTE Mechanical Prophylaxis: sequential compression device

## 2022-02-18 NOTE — ASSESSMENT & PLAN NOTE
· Daughter reports poor oral intake since Tue; states she thinks her dad is afraid to eat because of indigestion  Had one episode of nausea and vomiting on Tuesday, since then he has not eaten any food, only drinking fluids  · CT A/P neg for acute findings  · History of cerebral hemorrhage in Sep   Dysphagia s/p PEG tube 9/30/21  Was discharged to rehab on enteral feeds  Returned home on Feb 9     · As per daughter, at rehab facility patient was on oral feeds only, mechanical chopped  Family was not given tube care instructions  [facility]NA visit scheduled for tomorrow, Friday Feb 18  · G tube in place   · NPO for now    Start IV hydration D5NS  · SLP consult   · Nutrition consult for tube feeds  · Aspiration precautions  · Oral care  · Daughter requesting Palliative consult to discuss goals of care

## 2022-02-18 NOTE — ED PROVIDER NOTES
History  Chief Complaint   Patient presents with    Failure To Thrive     recently d/c'd from nursing home after a stroke in september  Refusing to eat or drink prior to d/c, family sent in for concerns for dehydration  Peg tube in place, not being used  Butler Cabot is a 78 y o   male with PMH of prostate cancer, HTN, ICH with gastrostomy tube   who presents to the emergency department with decreased oral intake  The history is provided by daughter who is bedside and [address]  speaking  Patient does not provide any history  Patient was recently discharged from rehab after [address] in September with left sided deficits  Patient had a g-tube placed during original admission due to decreased oral intake  Per the patient's daughter he is not had any oral intake for approximately last 3 weeks  She states he was discharged from rehab approximately 2 weeks ago  She states otherwise at his baseline  Daughter states that he did have some complaints of some GERD this week with but that has resolved  Daughter states that do not have access to provide G-tube feeds  They do have VNA services coming to the home on Friday however she feels that he will not make it to that point to dehydration  She states she called the PCP today and they recommending rehydration  History provided by:  Patient   used: No    Medical Problem  Location:  Decreased oral intake  Duration:  3 weeks  Timing:  Constant  Chronicity:  New  Associated symptoms: no congestion, no cough, no diarrhea, no fatigue, no fever, no headaches, no loss of consciousness, no myalgias, no nausea, no rash, no rhinorrhea, no sore throat, no vomiting and no wheezing        Prior to Admission Medications   Prescriptions Last Dose Informant Patient Reported? Taking?    ERROR: CANNOT USE RATIO BASED PRESCRIPTION MIXTURE NAMING FOR A NON-MIXTURE Past Month at Unknown time EMS/Transport Team No Yes   Sig: 10 mL (20 mg total) by Per PEG Tube route daily   Melatonin 5 MG TABS 2022 at Unknown time EMS/Transport Team Yes Yes   Sig: Take 5 mg by mouth daily at bedtime   PARoxetine (PAXIL) 10 mg tablet 2022 at Unknown time EMS/Transport Team Yes Yes   Sig: Take 10 mg by mouth daily   acetaminophen (TYLENOL) 325 mg tablet Past Month at Unknown time EMS/Transport Team No Yes   Si tablets (650 mg total) by Per G Tube route every 6 (six) hours as needed for mild pain or headaches   amLODIPine (NORVASC) 5 mg tablet Past Month at Unknown time EMS/Transport Team No Yes   Si tablet (5 mg total) by Per G Tube route daily   bisacodyl (Dulcolax) 10 mg suppository Past Month at Unknown time EMS/Transport Team Yes Yes   Sig: Insert 10 mg into the rectum as needed for constipation   doxazosin (CARDURA) 2 mg tablet Past Month at Unknown time EMS/Transport Team No Yes   Si tablet (2 mg total) by Per G Tube route daily   ferrous sulfate ( Ferrous Sulfate) 325 (65 Fe) mg tablet 2022 at Unknown time EMS/Transport Team Yes Yes   Sig: Take 325 mg by mouth daily with breakfast   finasteride (PROSCAR) 5 mg tablet 2022 at Unknown time EMS/Transport Team No Yes   Sig: Take 1 tablet (5 mg total) by mouth daily   folic acid (FOLVITE) 1 mg tablet Past Month at Unknown time EMS/Transport Team No Yes   Si tablet (1 mg total) by Per PEG Tube route daily   insulin glargine (Basaglar KwikPen) 100 units/mL injection pen 2022 at Unknown time EMS/Transport Team Yes Yes   Sig: Inject under the skin   insulin regular (HumuLIN R,NovoLIN R) 100 units/mL injection 2022 at Unknown time EMS/Transport Team No Yes   Sig: Inject 0 04 mL (4 Units total) under the skin every 6 (six) hours   lisinopril (ZESTRIL) 5 mg tablet Unknown at Unknown time EMS/Transport Team Yes No   Sig: Take 5 mg by mouth daily   magnesium hydroxide (MILK OF MAGNESIA) 400 mg/5 mL oral suspension Past Month at Unknown time EMS/Transport Team Yes Yes   Sig: Take by mouth as needed for constipation   omeprazole (PriLOSEC) 20 mg delayed release capsule 2/17/2022 at Unknown time EMS/Transport Team Yes Yes   Sig: Take 20 mg by mouth daily   ondansetron (ZOFRAN) 4 mg tablet Unknown at Unknown time EMS/Transport Team Yes No   oxyCODONE (ROXICODONE) 5 immediate release tablet Unknown at Unknown time EMS/Transport Team Yes No   oxyCODONE-Acetaminophen  MG/5ML SOLN Unknown at Unknown time EMS/Transport Team Yes No   Sig: Take 2 5 mg by mouth   sodium phosphate-biphosphate (FLEET) 7-19 g 118 mL enema Past Month at Unknown time EMS/Transport Team Yes Yes   Sig: Insert 1 enema into the rectum as needed      Facility-Administered Medications: None       Past Medical History:   Diagnosis Date    Hypertension     Prostate cancer ([facility] [address] )     no surgery required       Past Surgical History:   Procedure Laterality Date    GASTROSTOMY TUBE PLACEMENT N/A 9/30/2021    Procedure: INSERTION PEG TUBE;  Surgeon: Mayco Egan DO;  Location: [facility];  Service: General    HERNIA REPAIR         Family History   Problem Relation Age of Onset    No Known Problems Mother     No Known Problems Father      I have reviewed and agree with the history as documented  E-Cigarette/Vaping    E-Cigarette Use Never User      E-Cigarette/Vaping Substances    Nicotine No     THC No     CBD No     Flavoring No     Other No     Unknown No      Social History     Tobacco Use    Smoking status: Never Smoker    Smokeless tobacco: Never Used   Vaping Use    Vaping Use: Never used   Substance Use Topics    Alcohol use: Never    Drug use: Never       Review of Systems   Constitutional: Positive for appetite change  Negative for activity change, fatigue and fever  HENT: Negative for congestion, rhinorrhea and sore throat  Respiratory: Negative for apnea, cough, choking, wheezing and stridor  Cardiovascular: Negative for palpitations and leg swelling     Gastrointestinal: Negative for diarrhea, nausea and vomiting  Genitourinary: Negative for dysuria, frequency and urgency  Musculoskeletal: Negative for myalgias  Skin: Negative for rash  Neurological: Positive for facial asymmetry and weakness  Negative for loss of consciousness and headaches  All other systems reviewed and are negative  Physical Exam  Physical Exam  Vitals and nursing note reviewed  Constitutional:       Appearance: He is ill-appearing  Comments: Chronically ill-appearing   HENT:      Head: Normocephalic and atraumatic  Nose: Nose normal       Mouth/Throat:      Mouth: Mucous membranes are moist       Pharynx: Oropharynx is clear  Eyes:      Extraocular Movements: Extraocular movements intact  Pupils: Pupils are equal, round, and reactive to light  Cardiovascular:      Rate and Rhythm: Normal rate and regular rhythm  Pulses: Normal pulses  Heart sounds: Normal heart sounds  No murmur heard  No friction rub  No gallop  Pulmonary:      Breath sounds: No stridor  No wheezing, rhonchi or rales  Abdominal:      General: Abdomen is flat  Palpations: Abdomen is soft  Tenderness: There is no abdominal tenderness  There is no guarding or rebound  Hernia: No hernia is present  Musculoskeletal:      Cervical back: Normal range of motion  Skin:     General: Skin is warm and dry  Capillary Refill: Capillary refill takes less than 2 seconds  Findings: No rash  Neurological:      Mental Status: He is alert  Comments:  GCS13, E4, V4, M5  Alert  Eyes open spontaneously  Moaning to my questions but verbal 1 word responses to daughter questions  Left-sided facial droop Spontaneous movement on right  Not consistently following commands  Sensory:  Responds with grimace and verbally with painful stimuli x4  Motor: left sided weakness - no movement left upper extremity, mild flexion withdrawal left lower extremity    Spontaneous movement right arm, good withdrawal right leg    Vital Signs  ED Triage Vitals   Temperature Pulse Respirations Blood Pressure SpO2   02/17/22 1907 02/17/22 1907 02/17/22 1907 02/17/22 1907 02/17/22 1907   98 3 °F (36 8 °C) 79 18 126/70 93 %      Temp Source Heart Rate Source Patient Position - Orthostatic VS BP Location FiO2 (%)   02/17/22 1907 02/17/22 1907 02/17/22 1907 02/17/22 1907 --   Oral Monitor Sitting Left arm       Pain Score       02/17/22 2330       No Pain           Vitals:    02/17/22 1907 02/17/22 2330 02/18/22 0045   BP: 126/70  93/55   Pulse: 79 74 78   Patient Position - Orthostatic VS: Sitting Lying Lying         Visual Acuity      ED Medications  Medications   amLODIPine (NORVASC) tablet 5 mg (has no administration in time range)   bisacodyl (DULCOLAX) rectal suppository 10 mg (has no administration in time range)   doxazosin (CARDURA) tablet 2 mg (has no administration in time range)   ferrous sulfate oral syrup 300 mg (has no administration in time range)   finasteride (PROSCAR) tablet 5 mg (has no administration in time range)   folic acid (FOLVITE) tablet 1 mg (has no administration in time range)   lisinopril (ZESTRIL) tablet 5 mg (has no administration in time range)   omeprazole (PRILOSEC) suspension 2 mg/mL (has no administration in time range)   insulin lispro (HumaLOG) 100 units/mL subcutaneous injection 1-5 Units (has no administration in time range)   dextrose 5 % and sodium chloride 0 9 % infusion (has no administration in time range)   acetaminophen (TYLENOL) oral suspension 650 mg (has no administration in time range)   ondansetron (ZOFRAN) injection 4 mg (has no administration in time range)   aluminum-magnesium hydroxide-simethicone (MYLANTA) oral suspension 30 mL (has no administration in time range)   simethicone (MYLICON) oral suspension 40 mg (has no administration in time range)   sodium chloride 0 9 % bolus 500 mL (0 mL Intravenous Stopped 2/17/22 2223)   iohexol (OMNIPAQUE) 350 MG/ML injection (SINGLE-DOSE) 100 mL (100 mL Intravenous Given 2/17/22 2041)       Diagnostic Studies  Results Reviewed     Procedure Component Value Units Date/Time    HS Troponin I 2hr [[id number]]  (Normal) Collected: 02/18/22 0003    Lab Status: Final result Specimen: Blood from Arm, Left Updated: 02/18/22 0049     hs TnI 2hr 6 ng/L      Delta 2hr hsTnI 0 ng/L     HS Troponin 0hr (reflex protocol) [[id number]]  (Normal) Collected: 02/17/22 2003    Lab Status: Final result Specimen: Blood from Arm, Right Updated: 02/17/22 2042     hs TnI 0hr 6 ng/L     Comprehensive metabolic panel [[id number]]  (Abnormal) Collected: 02/17/22 2003    Lab Status: Final result Specimen: Blood from Arm, Right Updated: 02/17/22 2029     Sodium 137 mmol/L      Potassium 4 7 mmol/L      Chloride 100 mmol/L      CO2 27 mmol/L      ANION GAP 10 mmol/L      BUN 34 mg/dL      Creatinine 0 78 mg/dL      Glucose 116 mg/dL      Calcium 9 1 mg/dL      Corrected Calcium 10 0 mg/dL      AST 14 U/L      ALT 12 U/L      Alkaline Phosphatase 37 U/L      Total Protein 7 2 g/dL      Albumin 2 9 g/dL      Total Bilirubin 0 49 mg/dL      eGFR 83 ml/min/1 73sq m     Narrative:      [place] guidelines for Chronic Kidney Disease (CKD):     Stage 1 with normal or high GFR (GFR > 90 mL/min/1 73 square meters)    Stage 2 Mild CKD (GFR = 60-89 mL/min/1 73 square meters)    Stage 3A Moderate CKD (GFR = 45-59 mL/min/1 73 square meters)    Stage 3B Moderate CKD (GFR = 30-44 mL/min/1 73 square meters)    Stage 4 Severe CKD (GFR = 15-29 mL/min/1 73 square meters)    Stage 5 End Stage CKD (GFR <15 mL/min/1 73 square meters)  Note: GFR calculation is accurate only with a steady state creatinine    Lipase [[id number]]  (Normal) Collected: 02/17/22 2003    Lab Status: Final result Specimen: Blood from Arm, Right Updated: 02/17/22 2029     Lipase 110 u/L     CBC and differential [[id number]]  (Abnormal) Collected: 02/17/22 2003    Lab Status: Final result Specimen: Blood from Arm, Right Updated: 02/17/22 2016     WBC 13 60 Thousand/uL      RBC 3 58 Million/uL      Hemoglobin 11 3 g/dL      Hematocrit 35 1 %      MCV 98 fL      MCH 31 6 pg      MCHC 32 2 g/dL      RDW 13 3 %      MPV 10 0 fL      Platelets 630 Thousands/uL      nRBC 0 /100 WBCs      Neutrophils Relative 78 %      Immat GRANS % 1 %      Lymphocytes Relative 12 %      Monocytes Relative 9 %      Eosinophils Relative 0 %      Basophils Relative 0 %      Neutrophils Absolute 10 64 Thousands/µL      Immature Grans Absolute 0 07 Thousand/uL      Lymphocytes Absolute 1 60 Thousands/µL      Monocytes Absolute 1 22 Thousand/µL      Eosinophils Absolute 0 02 Thousand/µL      Basophils Absolute 0 05 Thousands/µL     UA w Reflex to Microscopic w Reflex to Culture [[id number]]     Lab Status: No result Specimen: Urine                  XR chest 1 view portable   ED Interpretation by Jimmie Crow PA-C (02/17 2129)   Right pulmonary effusion, no discrete consolidations  CT abdomen pelvis with contrast   Final Result by Jarvis Meigs, MD (02/17 2056)      No acute findings  Known left prostate capsular contour bulging in keeping with with extracapsular extent of prostate carcinoma unchanged from the comparison studies              Workstation performed: [id number]                    Procedures  ECG 12 Lead Documentation Only    Date/Time: 2/17/2022 8:14 PM  Performed by: Jimmie Crow PA-C  Authorized by: Jimmie Crow PA-C     Indications / Diagnosis:  Decreased oral intake   ECG reviewed by me, the ED Provider: yes    Patient location:  ED  Previous ECG:     Previous ECG:  Compared to current    Similarity:  Changes noted  Interpretation:     Interpretation: abnormal    Rate:     ECG rate:  73    ECG rate assessment: normal    Rhythm:     Rhythm: sinus rhythm    Ectopy:     Ectopy: none    QRS:     QRS axis:  Normal    QRS intervals:  Normal  Conduction:     Conduction: abnormal      Abnormal conduction: complete RBBB    ST segments:     ST segments:  Normal  T waves:     T waves: non-specific               ED Course                               SBIRT 20yo+      Most Recent Value   SBIRT (22 yo +)    In order to provide better care to our patients, we are screening all of our patients for alcohol and drug use  Would it be okay to ask you these screening questions? Yes Filed at: 02/17/2022 1935   Initial Alcohol Screen: US AUDIT-C     1  How often do you have a drink containing alcohol? 0 Filed at: 02/17/2022 1935   2  How many drinks containing alcohol do you have on a typical day you are drinking? 0 Filed at: 02/17/2022 1935   3a  Male UNDER 65: How often do you have five or more drinks on one occasion? 0 Filed at: 02/17/2022 1935   3b  FEMALE Any Age, or MALE 65+: How often do you have 4 or more drinks on one occassion? 0 Filed at: 02/17/2022 1935   Audit-C Score 0 Filed at: 02/17/2022 1935   FABIAN: How many times in the past year have you    Used an illegal drug or used a prescription medication for non-medical reasons? Never Filed at: 02/17/2022 1935                    MDM  Number of Diagnoses or Management Options  Decreased oral intake: new and requires workup  Failure to thrive in adult: new and requires workup  History of cerebral hemorrhage  Diagnosis management comments: Patient was seen and examined  in the emergency department for chief complaint of decreased oral intake  The patient presented with no oral intake over the last 3 weeks per daughter  Does have G-tube but no supplies to feet [address]  Has not fed through G-tube since original admission for cerebral hemorrhage  Daughter does flush  There are VNA services available Friday  PCP and daughter feel patient is dehydrated with a requesting fluids  On exam patient appears to be at baseline per previous notes  Lungs are clear, patient does have a chronic cough  Regular rate rhythm, no murmurs rubs or gallops    Abdomen is minimally tender generally without peritoneal signs  No rashes  See physical exam for neuro exam:  Left-sided deficits  DDx including but not limited to: metabolic abnormality, dehydration, viral illness, anemia, ACS, MI,  other infectious process including UTI; doubt Guillan Sherwood syndrome or myasthenia gravis or botulism or multiple sclerosis  Workup:  CBC, CMP, troponin, EKG, chest x-ray, lipase, urinalysis, CT abdomen pelvis with- looking for possible cause of decreased oral intake including obstruction/abdominal source as well as infectious cause for possible decreased appetite  Due to concerns of failure to thrive per the daughter will admit for further workup for coordination of care    Disposition:  General impression 49-year-old male who has decreased oral intake  Admitted for further management and termination for next steps care including possible G-tube feeds  The patient was admitted to the internal medicine service for further evaluation, management, and observation  The patient remained stable while under my care   Care was transferred with full report to admitting team         Amount and/or Complexity of Data Reviewed  Clinical lab tests: ordered and reviewed  Tests in the radiology section of CPT®: ordered and reviewed  Tests in the medicine section of CPT®: ordered and reviewed  Review and summarize past medical records: yes    Risk of Complications, Morbidity, and/or Mortality  Presenting problems: moderate  Diagnostic procedures: moderate  Management options: moderate    Patient Progress  Patient progress: stable      Disposition  Final diagnoses:   Decreased oral intake   History of cerebral hemorrhage   Failure to thrive in adult     Time reflects when diagnosis was documented in both MDM as applicable and the Disposition within this note     Time User Action Codes Description Comment    2/17/2022 10:51 PM Brian Marsh Add [R63 8] Decreased oral intake     2/17/2022 10:52 PM Gerda Kim Add [P71 27] History of cerebral hemorrhage     2/18/2022 12:29 AM Hipolito Cynthia Add [R62 7] Failure to thrive in adult     2/18/2022 12:29 AM Hipolito Cynthia Add [R13 10] Dysphagia, unspecified type     2/18/2022 12:32 AM Hipolito Cynthia Add [Z71 89] Goals of care, counseling/discussion     2/18/2022 12:52 AM Gerda Kim Modify [R62 7] Failure to thrive in adult       ED Disposition     ED Disposition Condition Date/Time Comment    Admit Stable Thu Feb 17, 2022 10:51 PM Case was discussed with Dr Yolis Little and the patient's admission status was agreed to be Admission Status: inpatient status to the service of Dr Yolis Little   Follow-up Information    None         Patient's Medications   Discharge Prescriptions    No medications on file       No discharge procedures on file      PDMP Review       Value Time User    PDMP Reviewed  Yes 9/17/2021  9:59 AM [address], DO          ED Provider  Electronically Signed by           Susie Chong PA-C  02/18/22 0649

## 2022-02-18 NOTE — DISCHARGE INSTR - DIET
PEG/NPO Podiatry - Clinic Note  Joey Henry : 2003 Sex: male MRN: [id number] 6/3/2021 4:09 PM    ASSESSMENT:  Ingrown toenail medial border hallux right    PLAN:  Discussed care and treatment with patient.  All questions were answered.    Discussed permanent vs temporary toenail removal. The advantages and disadvantages of each procedure is described to the patient today. This including but not limited to infection, non-resolution, slow healing. Patient has elected to have permanent removal of toenail border.  See procedure below.  Patient was given postop instructions written and verbally which were fully understood. Patient will begin soap and water cleansing in the shower tomorrow morning. Will apply Cortisporin otic twice daily and cover with a bandage. Patient will return to the office in 2 weeks for the first postop visit. The patient is encouraged to contact the office if there are any questions or problems before the scheduled return date.  Return if symptoms worsen or fail to improve.    Procedure:  Chemical nail matrixectomy hallux right  The patient and I reviewed the consent form.  Consent form was signed. Local, in the form of 2 cc 1% lidocaine and 1 cc 0.5% Marcaine was infiltrated in a digital type block. The right foot was prepped in the usual aseptic manner. A Penrose drain was utilized for hemostasis. The ingrown portion of toenail is resected back to the proximal nail fold and the offending nail is removed. The matrix was treated with sodium hydroxide. The site was then flushed with acetic acid. The surgical site was then dressed with the use of Cortisporin otic suspension, sterile 4 x 4's and Radha. Coban was used overlying for compression. The Penrose drain was removed and normal color was noted to return to the surgical digit. Patient tolerated the surgical procedure and the local anesthesia well.    CHIEF COMPLAINT:  Chief Complaint   Patient presents with   • Toenail     chem nail R grt        SUBJECTIVE:  Joey Henry is a 17 Luke Vargas DPM

## 2022-02-18 NOTE — ASSESSMENT & PLAN NOTE
· Localized prostate cancer with rising PSA, last Lupron Depot shot Sep 2021  Seen by Dr Jones Lomas on Feb 16; patient did not want radiation previously although now is willing to see radiation oncologist in addition to having Lupron Depot  · CT AP: Known left prostate capsular contour bulging in keeping with with extracapsular extent of prostate carcinoma unchanged from the comparison studies

## 2022-02-18 NOTE — CONSULTS
Consult for TF recommendation  Per nurse practitioner note, pt's daughter states that pt has not eating anything x9 days, which she states is possibly due to fear of indigestion per recent episode of nausea and voming (2/9/22)  Pt was placed on G-tube during last admission due to decreased PO intake and transferred to rehab, but daughter states that pt was on mechanical chopped in rehab  Daughter was not given instructions on home tube feeds  Recommend initiating Jevity 1 0 at 20 mL/hr  Increase by 10 mL every 6 hours  to the goal rate of 80 mL/hr x22 hours d/t prilosec and water flushes 55 mL every 4 hours  Provides 1,866 kcal, 78 g protein, and 1,800 mL fluid  When appropriate, can transition to bolus feeds of Jevity 1 0 at 355 mL (1 5 cans) 5x/day with 40 mL water flushes before and after feeds  Provides 1,882 kcal, 79 g protein, and 1,882 mL fluid

## 2022-02-18 NOTE — ASSESSMENT & PLAN NOTE
· Dysphagia s/p PEG tube Sep 30, 2021  · Discharge to rehab on enteral tube feeds   · SLP and Nutrition consult

## 2022-02-18 NOTE — UTILIZATION REVIEW
Initial Clinical Review    Admission: Date/Time/Statement:   Admission Orders (From admission, onward)     Ordered        02/17/22 2253  [address]  Once                      Orders Placed This Encounter   Procedures    INPATIENT ADMISSION     Standing Status:   Standing     Number of Occurrences:   1     Order Specific Question:   Level of Care     Answer:   Med Surg [16]     Order Specific Question:   Estimated length of stay     Answer:   More than 2 Midnights     Order Specific Question:   Certification     Answer:   I certify that inpatient services are medically necessary for this patient for a duration of greater than two midnights  See H&P and MD Progress Notes for additional information about the patient's course of treatment  ED Arrival Information     Expected Arrival Acuity    - 2/17/2022 19:03 Less Urgent         Means of arrival Escorted by Service Admission type    [facility] ([address]) Hospitalist Urgent         Arrival complaint    Failure to Thrive        Chief Complaint   Patient presents with    Failure To Thrive     recently d/c'd from nursing home after a stroke in september  Refusing to eat or drink prior to d/c, family sent in for concerns for dehydration  Peg tube in place, not being used  Initial Presentation:    78  Y O male presents to ED via  EMS  From home with loss of appetite  History of cerebral hemorrhage Sep 2021; dysphagia s/p PEG 9/30/21  Hospitalized at [facility] 9/16-10/12  Discharged to rehab on PEG tube diet  Returned home on Feb 9  Additional PMH is  Prostate cancer  2 days  Prior to admission, daughter states patient complained  of  Indigestion, reflux and abdominal pain with 1 episode of nausea and emesis  Does have a  Peg tube  But  Daughter states patient takes po food  Daughter not given any  Peg tube instructions,  VN  Visit planned  Daughter concerned about dehydration     Admit  Ip with Failure to thrive, History of Cerebral hemorrhage and plan is  IVF, NPO for now, apsiration precautions, monitor labs,  Speech eval and palliative care consult  Date:    2/18        Day 2:   Left hemiparesis noted, frail  Will continue on  Tube feeds for now as peg in place  Waiting  Speech eval   Palliative care consult pending  Monitor labs  Continue protonix  Nutrition  eval  Remain NPO with tube feeds for now  ED Triage Vitals   Temperature Pulse Respirations Blood Pressure SpO2   02/17/22 1907 02/17/22 1907 02/17/22 1907 02/17/22 1907 02/17/22 1907   98 3 °F (36 8 °C) 79 18 126/70 93 %      Temp Source Heart Rate Source Patient Position - Orthostatic VS BP Location FiO2 (%)   02/17/22 1907 02/17/22 1907 02/17/22 1907 02/17/22 1907 --   Oral Monitor Sitting Left arm       Pain Score       02/17/22 2330       No Pain          Wt Readings from Last 1 Encounters:   02/18/22 56 2 kg (123 lb 14 4 oz)     Additional Vital Signs:    -- -- -- -- -- None (Room air) --    02/18/22 08:49:56 99 8 °F (37 7 °C) 72 -- 117/57 77 95 % -- Lying   02/18/22 08:49:38 99 8 °F (37 7 °C) 72 -- 117/57 77 95 % -- --   02/18/22 02:22:49 98 3 °F (36 8 °C) 74 -- 114/60 78 92 % -- --   02/18/22 02:21:37 98 3 °F (36 8 °C) 74 18 91/58 69 93 % -- --   02/18/22 0115 -- 72 20 -- -- 95 % -- --   02/18/22 0045 -- 78 20 93/55 68 95 % None (Room air) Lying   02/18/22 0036 -- -- -- -- -- -- None (Room air) --   02/17/22 2330 -- 74 20 -- -- 93 % None (Room air) Lying   02/17/22 1907 98 3 °F (36 8 °C) 79 18 126/70 -- 93 % None (Room air) Sitting       Pertinent Labs/Diagnostic Test Results:   Ct abd/pelvis  ( 2/17)  No acute findings  CXR  ( 2/18)    Mild bibasilar opacities likely representing a combination of atelectasis and small pleural effusions  No pneumothorax         Results from last 7 days   Lab Units 02/18/22  0557 02/17/22 2003   WBC Thousand/uL 11 24* 13 60*   HEMOGLOBIN g/dL 10 6* 11 3*   HEMATOCRIT % 32 6* 35 1*   PLATELETS Thousands/uL 313 342 NEUTROS ABS Thousands/µL 8 49* 10 64*         Results from last 7 days   Lab Units 02/18/22  0557 02/17/22 2003   SODIUM mmol/L 136 137   POTASSIUM mmol/L 4 0 4 7   CHLORIDE mmol/L 100 100   CO2 mmol/L 25 27   ANION GAP mmol/L 11 10   BUN mg/dL 29* 34*   CREATININE mg/dL 0 64 0 78   EGFR ml/min/1 73sq m 90 83   CALCIUM mg/dL 9 2 9 1     Results from last 7 days   Lab Units 02/17/22 2003   AST U/L 14   ALT U/L 12   ALK PHOS U/L 37*   TOTAL PROTEIN g/dL 7 2   ALBUMIN g/dL 2 9*   TOTAL BILIRUBIN mg/dL 0 49     Results from last 7 days   Lab Units 02/18/22  1139 02/18/22  0619 02/18/22  0341 02/18/22  0110   POC GLUCOSE mg/dl 116 110 101 112     Results from last 7 days   Lab Units 02/18/22  0557 02/17/22 2003   GLUCOSE RANDOM mg/dL 92 116           Results from last 7 days   Lab Units 02/18/22  0003 02/17/22 2003   HS TNI 0HR ng/L  --  6   HS TNI 2HR ng/L 6  --    HSTNI D2 ng/L 0  --                Results from last 7 days   Lab Units 02/17/22 2003   LIPASE u/L 110         ED Treatment:   Medication Administration from 02/17/2022 1903 to 02/18/2022 0151       Date/Time Order Dose Route Action Comments     02/17/2022 2223 sodium chloride 0 9 % bolus 500 mL 0 mL Intravenous Stopped      02/17/2022 2004 sodium chloride 0 9 % bolus 500 mL 500 mL Intravenous New Bag      02/17/2022 2041 iohexol (OMNIPAQUE) 350 MG/ML injection (SINGLE-DOSE) 100 mL 100 mL Intravenous Given      02/18/2022 0124 insulin lispro (HumaLOG) 100 units/mL subcutaneous injection 1-5 Units 1 Units Subcutaneous Not Given         Present on Admission:   Failure to thrive in adult   Dysphagia   Prostate cancer ([facility] [address] )      Admitting Diagnosis: Adult failure to thrive [R62 7]  Failure to thrive in adult [R62 7]  Goals of care, counseling/discussion [Z71 89]  Decreased oral intake [R63 8]  History of cerebral hemorrhage [Z86 79]  Dysphagia, unspecified type [R13 10]  Age/Sex: 78 y o  male  Admission Orders:  Scheduled Medications:  amLODIPine, 5 mg, Per G Tube, Daily  doxazosin, 2 mg, Per G Tube, Daily  finasteride, 5 mg, Oral, Daily  folic acid, 1 mg, Per PEG Tube, Daily  insulin lispro, 1-5 Units, Subcutaneous, Q6H KARLENE  lisinopril, 5 mg, Oral, Daily  omeprazole (PRILOSEC) suspension 2 mg/mL, 20 mg, Per G Tube, Daily      Continuous IV Infusions:  dextrose 5 % and sodium chloride 0 9 %, 75 mL/hr, Intravenous, Continuous      PRN Meds:  acetaminophen, 650 mg, Per G Tube, Q4H PRN  aluminum-magnesium hydroxide-simethicone, 30 mL, Per G Tube, Q6H PRN  bisacodyl, 10 mg, Rectal, Daily PRN  ondansetron, 4 mg, Intravenous, Q6H PRN  simethicone, 40 mg, Per G Tube, Q6H PRN        IP CONSULT TO CASE MANAGEMENT  IP CONSULT TO NUTRITION SERVICES  IP CONSULT TO PALLIATIVE CARE    Network Utilization Review Department  ATTENTION: Please call with any questions or concerns to [phone] and carefully listen to the prompts so that you are directed to the right person  All voicemails are confidential   Jo Doing all requests for admission clinical reviews, approved or denied determinations and any other requests to dedicated fax number below belonging to the campus where the patient is receiving treatment   List of dedicated fax numbers for the Facilities:  [address] DENIALS (Administrative/Medical Necessity) [phone]   [address] (Maternity/NICU/Pediatrics) [phone] [address] [place] [address] [address] [place] [address] [place] [address] [facility] [phone]   [place] [address] [phone]

## 2022-02-18 NOTE — ASSESSMENT & PLAN NOTE
· History of cerebral hemorrhage Sep 2021  Seen by Neurology in Nov and recommended to avoid all antiplatelets and anticoagulation indefinitely   Goal normotension SBP <140

## 2022-02-18 NOTE — SPEECH THERAPY NOTE
Speech Language/Pathology  Speech/Language Pathology  Assessment    Patient Name: Jero Valdovinos  JPJPR'Y Date: 2/18/2022     Problem List  Principal Problem:    Failure to thrive in adult  Active Problems:    Prostate cancer ([facility] [address] )    Goals of care, counseling/discussion    Dysphagia    History of cerebral hemorrhage    Past Medical History  Past Medical History:   Diagnosis Date    Hypertension     Prostate cancer ([facility] [address] )     no surgery required     Past Surgical History  Past Surgical History:   Procedure Laterality Date    GASTROSTOMY TUBE PLACEMENT N/A 9/30/2021    Procedure: INSERTION PEG TUBE;  Surgeon: Christiana Armenta DO;  [facility];  Service: General    HERNIA REPAIR          Bedside Swallow Evaluation:    Summary:  Pt was alert, confused, and positioned upright  Limited mouth opening for presentations with maximal encouragement  Pt did not follow basic commands  Difficult to assess oral stage for bolus formation and control  Transfer was delayed  laryngeal rise appeared weak  Immediate wet cough with tsp trials of puree, honey thick and nectar thick  Currently appears to be a high risk for aspiration, and was not very agreeable to PO presentations  Recommendations:  Diet: NPO, PEG for primary  Liquid: NPO  Meds: PEG  Positioning:Upright  Oral care  Aspiration precautions  Reflux precautions  Therapy Prognosis: guarded  Prognosis considerations: h/o dysphagia,cognitive status  Frequency:2-5x's as able    Consider consult w/:  Nutrition, Palliative    H&P/Admit info/ pertinent provider notes: (PMH noted above)  History of Present Illness:  Jero Valdovinos is a 78 y o  male who presents with c/o loss of appetite  History of cerebral hemorrhage Sep 2021; dysphagia s/p PEG 9/30/21  Hospitalized at [facility] 9/16-10/12  Discharged to rehab on PEG tube diet  Returned home on Feb 9   Daughter reports he was eating well until Tuesday, patient complained of indigestion, reflux and abdominal pain, had 1 episode of nausea and emesis  Since lunch on Tuesday patient has not eaten, only drinking fluids  Patient has a PEG tube although as per daughter at rehab facility he was on oral feeds  He was discharged on oral feeds, daughter reports she was not given peg tube instructions although she has been flushing the peg tube  VNA scheduled to visit tomorrow  Daughter brought him in due to concern for dehydration  She is requesting palliative consult to discuss goals of care  Reports constipation, she gave him a suppository and he had a small bowel movement, no melena or hematochezia  Chief Complaint:   "Loss of appetite, acid reflux"  Special Studies:  XR chest portable 02/17/2022  Mild bibasilar opacities likely representing a combination of atelectasis and small pleural effusions  No pneumothorax  Attestation signed by Tarah Orantes RD at 2/18/2022 11:19 AM   Concur with dietetic intern's note  Show:Clear all  []Manual[]Template[x]Copie  Added by:  [x]Lakshmi Kirkpatrick  []Bib for deta  Consult for TF recommendation  Per nurse practitioner note, pt's daughter states that pt has not eating anything x9 days, which she states is possibly due to fear of indigestion per recent episode of nausea and voming (2/9/22)  Pt was placed on G-tube during last admission due to decreased PO intake and transferred to rehab, but daughter states that pt was on mechanical chopped in rehab  Daughter was not given instructions on home tube feeds  Recommend initiating Jevity 1 0 at 20 mL/hr  Increase by 10 mL every 6 hours  to the goal rate of 80 mL/hr x22 hours d/t prilosec and water flushes 55 mL every 4 hours  Provides 1,866 kcal, 78 g protein, and 1,800 mL fluid  When appropriate, can transition to bolus feeds of Jevity 1 0 at 355 mL (1 5 cans) 5x/day with 40 mL water flushes before and after feeds  Provides 1,882 kcal, 79 g protein, and 1,882 mL fluid              Cosigned by: Tarah Orantes RD at 2/18/2022 11:19 AM Previous VBS:  None  Goal(s):  Pt will tolerate po trials--->least restrictive diet w/ minimal to no s/s aspiration or oral/pharyngeal difficulties  Patient's goal: none sated    Did the pt report pain? No    If yes, was nursing notified/was it addressed? N/a  Reason for consult:  R/o aspiration  Determine safest and least restrictive diet   poor intake  weight loss   h/o dysphagia     Precautions:  Contact  Food allergies:  No known allergies  Current diet:  NPO  Premorbid diet[de-identified]  Mech soft when pt was d/c'd from rehab  ? liquid consistency  PEG tube feeding has not been used  Pt was PEG tube feedings only during previous hospital admit 10/21  PEG was placed 9/30/21  O2 requirement:  Room Air  Voice/Speech:  Intermittent verbalizations  Vocal quality WNL   Social:  Lives w/ children  Follows commands:   Did not follow basic commands                       Cognitive Status:  Alert and confused  Oral [facility] exam:  Dentition:Edentolous, missing top and bottom dentures  Labial strength and ROM: No asymmetry at rest  Lingual strength and ROM: DNT pt could not follow commands  Mandibular strength and ROM:DNT pt could not follow commands  Secretion management:no drooling observed  Volitional cough:DNT pt could not follow commands  Volitional swallow:DNT pt could not follow commands    Items administered:  Apple sauce, and honey thick, nectar  liquid by tsp      Esophageal stage:  No s/s reported  H/o EGD    Results d/w  Pt, nursing

## 2022-02-18 NOTE — CONSULTS
Consultation - Palliative and Supportive Care   Candelario Jay 78 y o  male [id number]    Assessment/Problems Actively Addressed:  · Goals of care counseling  · CVA  · Dysphagia S/P PEG tube   · Prostate cancer    Goals:  · Level 3 code status  · Her family, led by daughter/Kiera are not ready for hospice cares at this time  · Since his brain bleed in 2021 s/p PEG, family noted improvement in speech, swallow and function while at rehab  At one point, they were able to stop tube feedings after a month and since then he was able to finish and tolerate a modified diet  It was when he came home that they noted a decline in his oral intake  · Since there was progress since 2021 until this decline, daughter wants to give him a chance to get better  If there is no improvement OR if there is continued decline, they seem open to discussing hospice  · PSC will sign off  Please reach out again if further assistance is needed, ie readdress goal if with decline/no improvement  Social support:   Time spent providing supportive listening   Patient lives with daughter/Kiera who provides him majority of the cares  Will [place]in is more than willing to continue providing him cares  She fins support from his 2 other brother who live locally  Patient's wife  in 2016  I have reviewed the patient's controlled substance dispensing history in the Prescription Drug Monitoring Program in compliance with the [facility] regulations before prescribing any controlled substances  Last refills  2022  1   2022  Oxycodone Hcl (Ir) 5 MG Tablet    30 00  [address]   [id number]   Omn (7227)   0  15 00 MME  Comm Ins   [place]   2022  2   2022  Oxycodone Hcl (Ir) 5 MG Tablet    30 00  [address]   [id number]   Omn (7227)    15 00 MME  Comm Ins   [place]       Decisional apparatus:  Patient is not competent on exam today    If competence is lost, patient's substitute decision maker would default to children, led by daughter/Kiera by [place] Act 169  Advance Directive/Living Will: not on file  POLST: not on file  POA Forms: not on file    We appreciate the invitation to be involved in this patient's care  We will continue to follow throughout this hospitalization  Please do not hesitate to reach our on call provider through our clinic answering service at  should you have acute symptom control concerns  Melanie Rios MD  Palliative Medicine & Supportive Care  Internal Medicine  Available via [facility] Text  Office: [phone]  Fax: [phone]     IDENTIFICATION:  Inpatient consult to Palliative Care  Consult performed by: Melanie Rios MD  Consult ordered by: Bo Huizar, [address] [place] [address]        Physician Requesting Consult: Lilian Alvarado MD  Reason for Consult / Principal Problem: GOC counseling and SM secondary to CVA with dysphagia        History of Present Illness:  Candelario Jay is a 78 y o  male who presents with a palliative diagnosis of prostate cancer and CVA with dysphasia requiring PEG tube was brought into the ED at [facility] on 2/17/22 secondary to loss of appetite and acid reflux  Kali Finch has a medical history of cerebral hemorrhage in 2021 leading to dysphagia and PEG tube 09/30/2021  He also has a history of prostate cancer and has been followed by Dr Syl Cornejo  Kali Finch had a prolonged hospitalization at [facility] from 9/16 to 10/12/21 at which point he was discharged from rehab  At rehab, Lillie Bell improved with speech therapy and when he eventually came home, he was tolerating oral intake  Though his PEG tube was flushed at home, he has not been using it for feeds  On 02/15/2022, he developed abdominal pain with emesis and complaints of reflux  Given Kali Finch' inability to tolerate further PO intake, his daughter brought him to the ED for evaluation of dehydration  She has also requested a palliative and supportive care consult to readdress goals      Saw patient in the am  He was seen awake and alert, conversant but not able to answer all questions appropriately  He said there were many people coming in and out of his room  Called daughter/Kiera  Rest of conversation as above  Review of Systems:  Review of Systems   Unable to perform ROS: mental status change   Eyes: Blurred vision: denies pain, feels better           Past Medical History:   Diagnosis Date    Hypertension     Prostate cancer ([facility] [address] )     no surgery required     Past Surgical History:   Procedure Laterality Date    GASTROSTOMY TUBE PLACEMENT N/A 9/30/2021    Procedure: INSERTION PEG TUBE;  Surgeon: Mindy Oconnor DO;  [facility];  Service: General    HERNIA REPAIR       Social History     Socioeconomic History    Marital status: Single     Spouse name: Not on file    Number of children: Not on file    Years of education: Not on file    Highest education level: Not on file   Occupational History    Not on file   Tobacco Use    Smoking status: Never Smoker    Smokeless tobacco: Never Used   Vaping Use    Vaping Use: Never used   Substance and Sexual Activity    Alcohol use: Never    Drug use: Never    Sexual activity: Not Currently   Other Topics Concern    Not on file   Social History Narrative    Consumes 1 cup of coffee per day     Social Determinants of Health     Financial Resource Strain: Not on file   Food Insecurity: Not on file   Transportation Needs: Not on file   Physical Activity: Not on file   Stress: Not on file   Social Connections: Not on file   Intimate Partner Violence: Not on file   Housing Stability: Not on file     Family History   Problem Relation Age of Onset    No Known Problems Mother     No Known Problems Father        Medications:  all current active meds have been reviewed and current meds:   Current Facility-Administered Medications   Medication Dose Route Frequency    acetaminophen (TYLENOL) oral suspension 650 mg  650 mg Per G Tube Q4H PRN    aluminum-magnesium hydroxide-simethicone (MYLANTA) oral suspension 30 mL  30 mL Per G Tube Q6H PRN    amLODIPine (NORVASC) tablet 5 mg  5 mg Per G Tube Daily    bisacodyl (DULCOLAX) rectal suppository 10 mg  10 mg Rectal Daily PRN    dextrose 5 % and sodium chloride 0 9 % infusion  75 mL/hr Intravenous Continuous    doxazosin (CARDURA) tablet 2 mg  2 mg Per G Tube Daily    finasteride (PROSCAR) tablet 5 mg  5 mg Oral Daily    folic acid (FOLVITE) tablet 1 mg  1 mg Per PEG Tube Daily    insulin lispro (HumaLOG) 100 units/mL subcutaneous injection 1-5 Units  1-5 Units Subcutaneous Q6H [facility]    lisinopril (ZESTRIL) tablet 5 mg  5 mg Oral Daily    omeprazole (PRILOSEC) suspension 2 mg/mL  20 mg Per G Tube Daily    ondansetron (ZOFRAN) injection 4 mg  4 mg Intravenous Q6H PRN    simethicone (MYLICON) oral suspension 40 mg  40 mg Per G Tube Q6H PRN       No Known Allergies      Medications    Current Facility-Administered Medications:     acetaminophen (TYLENOL) oral suspension 650 mg, 650 mg, Per G Tube, Q4H PRN, Rachelchucky Woody, CRNP    aluminum-magnesium hydroxide-simethicone (MYLANTA) oral suspension 30 mL, 30 mL, Per G Tube, Q6H PRN, Kingston Speck, CRNP    amLODIPine (NORVASC) tablet 5 mg, 5 mg, Per G Tube, Daily, Kingston Speck, CRNP, 5 mg at 02/18/22 1023    bisacodyl (DULCOLAX) rectal suppository 10 mg, 10 mg, Rectal, Daily PRN, Rachelchucky Woody, CRNP    dextrose 5 % and sodium chloride 0 9 % infusion, 75 mL/hr, Intravenous, Continuous, Rachelchucky Woody, CRNP, Last Rate: 75 mL/hr at 02/18/22 0403, 75 mL/hr at 02/18/22 0403    doxazosin (CARDURA) tablet 2 mg, 2 mg, Per G Tube, Daily, Rachelchucky Woody, CRNP, 2 mg at 02/18/22 1023    finasteride (PROSCAR) tablet 5 mg, 5 mg, Oral, Daily, Kingstonchucky Woody CRNP, 5 mg at 48/16/16 8997    folic acid (FOLVITE) tablet 1 mg, 1 mg, Per PEG Tube, Daily, YUSRA Posadas, 1 mg at 02/18/22 1023    insulin lispro (HumaLOG) 100 units/mL subcutaneous injection 1-5 Units, 1-5 Units, Subcutaneous, Q6H [address] **AND** Fingerstick Glucose (POCT), , , Q6H, YUSRA Myers    lisinopril (ZESTRIL) tablet 5 mg, 5 mg, Oral, Daily, YUSRA Myers, 5 mg at 02/18/22 1023    omeprazole (PRILOSEC) suspension 2 mg/mL, 20 mg, Per G Tube, Daily, YUSRA Myers, 20 mg at 02/18/22 1023    ondansetron (ZOFRAN) injection 4 mg, 4 mg, Intravenous, Q6H PRN, YUSRA Myers    simethicone (MYLICON) oral suspension 40 mg, 40 mg, Per G Tube, Q6H PRN, YUSRA Myers    Objective  /57 (BP Location: Left arm)   Pulse 72   Temp 99 8 °F (37 7 °C) (Oral)   Resp 18   Ht 5' 3" (1 6 m)   Wt 56 2 kg (123 lb 14 4 oz)   SpO2 95%   BMI 21 95 kg/m²     Physical Exam:   Physical Exam  Constitutional:       General: He is not in acute distress  Appearance: He is ill-appearing  He is not toxic-appearing or diaphoretic  HENT:      Head: Normocephalic and atraumatic  Eyes:      General: No scleral icterus  Right eye: No discharge  Left eye: No discharge  Pulmonary:      Effort: Pulmonary effort is normal  No respiratory distress  Breath sounds: No stridor  Abdominal:      General: Abdomen is flat  There is no distension  Skin:     Coloration: Skin is pale  Skin is not jaundiced  Neurological:      Mental Status: He is alert  Comments: Conversant but not answering all questions appropriately   Psychiatric:         Mood and Affect: Mood normal          Behavior: Behavior normal            Lab Results:   I have personally reviewed pertinent labs  , CBC:   Lab Results   Component Value Date    WBC 11 24 (H) 02/18/2022    HGB 10 6 (L) 02/18/2022    HCT 32 6 (L) 02/18/2022    MCV 97 02/18/2022     02/18/2022    MCH 31 6 02/18/2022    MCHC 32 5 02/18/2022    RDW 13 3 02/18/2022    MPV 9 6 02/18/2022    NRBC 0 02/18/2022   , CMP:   Lab Results   Component Value Date    SODIUM 136 02/18/2022    K 4 0 02/18/2022     02/18/2022    CO2 25 02/18/2022    BUN 29 (H) 02/18/2022    CREATININE 0 64 02/18/2022    CALCIUM 9 2 02/18/2022    AST 14 02/17/2022    ALT 12 02/17/2022    ALKPHOS 37 (L) 02/17/2022    EGFR 90 02/18/2022     Imaging Studies: I have personally reviewed pertinent reports  EKG, Pathology, and Other Studies: I have personally reviewed pertinent reports  Counseling / Coordination of Care  Total floor / unit time spent today 60 minutes  Greater than 50% of total time was spent with the patient and / or family counseling and / or coordination of care  A description of the counseling / coordination of care: Reviewed chart, provided medical updates, determined goals of care, discussed palliative care and symptom management, discussed comfort care and hospice care, discussed code status, determined competency and POA/HCA, determined social/family support, provided psychosocial support  Reviewed with JALIL team, RN and CM

## 2022-02-18 NOTE — UTILIZATION REVIEW
Inpatient Admission Authorization Request   NOTIFICATION OF INPATIENT ADMISSION/INPATIENT AUTHORIZATION REQUEST   SERVICING FACILITY:   [address] [place] [address], [facility], [address] [facility]  Tax ID: [id number]  NPI: [id number]  Place of Service: Inpatient [facility]  [address]  Place of Service Code: 24     ATTENDING PROVIDER:  Attending Name and NPI#: Velasquez Galdamez Md [[id number]]  Address: [address] [place] [address], [facility], [facility] [place] [facility]  Phone: [phone]     UTILIZATION REVIEW CONTACT:  Malick Le, Utilization   Network Utilization Review Department  Phone: [phone]  Fax: [phone]  Email: [email or web address]     PHYSICIAN ADVISORY SERVICES:  FOR IVWZ-XL-[id number] REVIEW - MEDICAL NECESSITY DENIAL  Phone: [phone]  Fax: [phone]  Email: [email or web address]     TYPE OF REQUEST:  Inpatient Status     ADMISSION INFORMATION:  ADMISSION DATE/TIME: 2/17/22 10:53 PM  PATIENT DIAGNOSIS CODE/DESCRIPTION:  Adult failure to thrive [R62 7]  Failure to thrive in adult [R62 7]  Goals of care, counseling/discussion [Z71 89]  Decreased oral intake [R63 8]  History of cerebral hemorrhage [Z86 79]  Dysphagia, unspecified type [R13 10]  DISCHARGE DATE/TIME: No discharge date for patient encounter    DISCHARGE DISPOSITION (IF DISCHARGED): Non [facility] SNF/TCU/SNU     IMPORTANT INFORMATION:  Please contact the Malick Le directly with any questions or concerns regarding this request  Department voicemails are confidential     Send requests for admission clinical reviews, concurrent reviews, approvals, and administrative denials due to lack of clinical to fax [phone]      f

## 2022-02-18 NOTE — ED NOTES
Pt's daughter arrived at bedside, stated he was in rehab for a stroke since September and was making a lot of progress  He was d/c'd a week ago and prior to his d/c PO intake declined substantially, her concerns were communicated to OT and they contacted patient's physician and was told to bring him back in for IVF        Nadia Hong RN  02/17/22 2454

## 2022-02-18 NOTE — H&P
[address] [facility]  H&P- Altneena Valdovinos 1940, 78 y o  male MRN: [id number]  Unit/Bed#: ED 30 Encounter: [id number]  Primary Care Provider: Cleve Garcia DO   Date and time admitted to hospital: 2/17/2022  7:03 PM    * Failure to thrive in adult  Assessment & Plan  · Daughter reports poor oral intake since Tue; states she thinks her dad is afraid to eat because of indigestion  Had one episode of nausea and vomiting on Tuesday, since then he has not eaten any food, only drinking fluids  · CT A/P neg for acute findings  · History of cerebral hemorrhage in Sep   Dysphagia s/p PEG tube 9/30/21  Was discharged to rehab on enteral feeds  Returned home on Feb 9     · As per daughter, at rehab facility patient was on oral feeds only, mechanical chopped  Family was not given tube care instructions  VNA visit scheduled for tomorrow, Friday Feb 18  · G tube in place   · NPO for now  Start IV hydration D5NS  · SLP consult   · Nutrition consult for tube feeds  · Aspiration precautions  · Oral care  · Daughter requesting Palliative consult to discuss goals of care    History of cerebral hemorrhage  Assessment & Plan  · History of cerebral hemorrhage Sep 2021  Seen by Neurology in Nov and recommended to avoid all antiplatelets and anticoagulation indefinitely  Goal normotension SBP <140    Dysphagia  Assessment & Plan  · Dysphagia s/p PEG tube Sep 30, 2021  · Discharge to rehab on enteral tube feeds   · SLP and Nutrition consult    Prostate cancer [facility])  Assessment & Plan  · Localized prostate cancer with rising PSA, last Lupron Depot shot Sep 2021  Seen by Dr Corinne Higgins on Feb 16; patient did not want radiation previously although now is willing to see radiation oncologist in addition to having Lupron Depot  · CT AP: Known left prostate capsular contour bulging in keeping with with extracapsular extent of prostate carcinoma unchanged from the comparison studies      Goals of care, counseling/discussion  Assessment & Plan  · Patient is DNR/DNI  Daughter requesting palliative care consult to discuss goals of care    VTE Prophylaxis: Avoid AC due to [address]  / sequential compression device   Code Status: DNR/I  POLST: POLST is not applicable to this patient  Discussion with family:  Daughter at bedside    Anticipated Length of Stay:  Patient will be admitted on an Inpatient basis with an anticipated length of stay of  > 2 midnights  Justification for Hospital Stay: FTT, dysphagia    Total Time for Visit, including Counseling / Coordination of Care: 60 minutes  Greater than 50% of this total time spent on direct patient counseling and coordination of care  Chief Complaint:   "Loss of appetite, acid reflux"    History of Present Illness:    Samina Coelho is a 78 y o  male who presents with c/o loss of appetite  History of cerebral hemorrhage Sep 2021; dysphagia s/p PEG 9/30/21  Hospitalized at [facility] 9/16-10/12  Discharged to rehab on PEG tube diet  Returned home on Feb 9  Daughter reports he was eating well until Tuesday, patient complained of indigestion, reflux and abdominal pain, had 1 episode of nausea and emesis  Since lunch on Tuesday patient has not eaten, only drinking fluids  Patient has a PEG tube although as per daughter at rehab facility he was on oral feeds  He was discharged on oral feeds, daughter reports she was not given peg tube instructions although she has been flushing the peg tube  VNA scheduled to visit tomorrow  Daughter brought him in due to concern for dehydration  She is requesting palliative consult to discuss goals of care  Reports constipation, she gave him a suppository and he had a small bowel movement, no melena or hematochezia       Review of Systems:    Review of Systems   Unable to perform ROS: Other       Past Medical and Surgical History:     Past Medical History:   Diagnosis Date    Hypertension     Prostate cancer ([facility] [address] )     no surgery required Past Surgical History:   Procedure Laterality Date    GASTROSTOMY TUBE PLACEMENT N/A 9/30/2021    Procedure: INSERTION PEG TUBE;  Surgeon: Darlene Aceves DO;  Location: [facility];  Service: General    HERNIA REPAIR         Meds/Allergies:    Prior to Admission medications    Medication Sig Start Date End Date Taking?  Authorizing Provider   acetaminophen (TYLENOL) 325 mg tablet 2 tablets (650 mg total) by Per G Tube route every 6 (six) hours as needed for mild pain or headaches 10/12/21  Yes Fco Campa DO   amLODIPine (NORVASC) 5 mg tablet 1 tablet (5 mg total) by Per G Tube route daily 10/13/21  Yes Fco Campa DO   bisacodyl (Dulcolax) 10 mg suppository Insert 10 mg into the rectum as needed for constipation   Yes Historical Provider, MD   doxazosin (CARDURA) 2 mg tablet 1 tablet (2 mg total) by Per G Tube route daily 10/13/21  Yes Fco Campa DO   ERROR: CANNOT USE RATIO BASED PRESCRIPTION MIXTURE NAMING FOR A NON-MIXTURE 10 mL (20 mg total) by Per PEG Tube route daily 10/13/21  Yes Fco Campa DO   ferrous sulfate (KP Ferrous Sulfate) 325 (65 Fe) mg tablet Take 325 mg by mouth daily with breakfast   Yes Historical Provider, MD   finasteride (PROSCAR) 5 mg tablet Take 1 tablet (5 mg total) by mouth daily 6/9/21  Yes YUSRA De La Rosa   folic acid (FOLVITE) 1 mg tablet 1 tablet (1 mg total) by Per PEG Tube route daily 10/13/21  Yes Fco Campa DO   insulin glargine (Basaglar KwikPen) 100 units/mL injection pen Inject under the skin   Yes Historical Provider, MD   insulin regular (HumuLIN R,NovoLIN R) 100 units/mL injection Inject 0 04 mL (4 Units total) under the skin every 6 (six) hours 10/12/21  Yes Fco Campa DO   magnesium hydroxide (MILK OF MAGNESIA) 400 mg/5 mL oral suspension Take by mouth as needed for constipation   Yes Historical Provider, MD   Melatonin 5 MG TABS Take 5 mg by mouth daily at bedtime   Yes Historical Provider, MD   omeprazole (PriLOSEC) 20 mg delayed release capsule Take 20 mg by mouth daily   Yes Historical Provider, MD   PARoxetine (PAXIL) 10 mg tablet Take 10 mg by mouth daily   Yes Historical Provider, MD   sodium phosphate-biphosphate (FLEET) 7-19 g 118 mL enema Insert 1 enema into the rectum as needed   Yes Historical Provider, MD   lisinopril (ZESTRIL) 5 mg tablet Take 5 mg by mouth daily    Historical Provider, MD   ondansetron (ZOFRAN) 4 mg tablet  2/10/22   Historical Provider, MD   oxyCODONE (ROXICODONE) 5 immediate release tablet  2/2/22   Historical Provider, MD   oxyCODONE-Acetaminophen  MG/5ML SOLN Take 2 5 mg by mouth    Historical Provider, MD     I have reviewed home medications using allscripts  Allergies: No Known Allergies    Social History:     Marital Status: Single   Occupation: retired  Patient Pre-hospital Living Situation: resides with daughter  Patient Pre-hospital Level of Mobility: w/c  Patient Pre-hospital Diet Restrictions: chopped diet, thin  Substance Use History:   Social History     Substance and Sexual Activity   Alcohol Use Never     Social History     Tobacco Use   Smoking Status Never Smoker   Smokeless Tobacco Never Used     Social History     Substance and Sexual Activity   Drug Use Never       Family History:    Family History   Problem Relation Age of Onset    No Known Problems Mother     No Known Problems Father        Physical Exam:     Vitals:   Blood Pressure: 126/70 (02/17/22 1907)  Pulse: 79 (02/17/22 1907)  Temperature: 98 3 °F (36 8 °C) (02/17/22 1907)  Temp Source: Oral (02/17/22 1907)  Respirations: 18 (02/17/22 1907)  SpO2: 93 % (02/17/22 1907)    Physical Exam  Constitutional:       General: He is not in acute distress  Appearance: He is not ill-appearing, toxic-appearing or diaphoretic  Comments: Underweight; chronically ill and debilitated   HENT:      Head: Normocephalic and atraumatic  Nose: No congestion or rhinorrhea  Mouth/Throat:      Mouth: Mucous membranes are dry  Eyes:      General: No scleral icterus  Conjunctiva/sclera: Conjunctivae normal    Cardiovascular:      Rate and Rhythm: Normal rate and regular rhythm  Heart sounds: Murmur heard  Pulmonary:      Effort: Pulmonary effort is normal       Breath sounds: Normal breath sounds  Abdominal:      General: Bowel sounds are normal  There is no distension  Palpations: Abdomen is soft  Tenderness: There is no abdominal tenderness  There is no guarding  Comments: Abdomen soft, NTND  PEG tube, decreased bowel sounds; no visible signs of pain on palpation of abdomen   Musculoskeletal:      Right lower leg: No edema  Left lower leg: No edema  Skin:     General: Skin is warm and dry  Coloration: Skin is not pale  Neurological:      Mental Status: He is alert  Comments: Oriented to person; somnolent   Psychiatric:      Comments: Responds to name       Additional Data:     Lab Results: I have personally reviewed pertinent reports  Results from last 7 days   Lab Units 02/17/22 2003   WBC Thousand/uL 13 60*   HEMOGLOBIN g/dL 11 3*   HEMATOCRIT % 35 1*   PLATELETS Thousands/uL 342   NEUTROS PCT % 78*   LYMPHS PCT % 12*   MONOS PCT % 9   EOS PCT % 0     Results from last 7 days   Lab Units 02/17/22 2003   SODIUM mmol/L 137   POTASSIUM mmol/L 4 7   CHLORIDE mmol/L 100   CO2 mmol/L 27   BUN mg/dL 34*   CREATININE mg/dL 0 78   ANION GAP mmol/L 10   CALCIUM mg/dL 9 1   ALBUMIN g/dL 2 9*   TOTAL BILIRUBIN mg/dL 0 49   ALK PHOS U/L 37*   ALT U/L 12   AST U/L 14   GLUCOSE RANDOM mg/dL 116                       Imaging: I have personally reviewed pertinent reports  XR chest 1 view portable   ED Interpretation by Valdez Gerardo PA-C (02/17 2129)   Right pulmonary effusion, no discrete consolidations  CT abdomen pelvis with contrast   Final Result by Pierre Raman MD (02/17 2056)      No acute findings        Known left prostate capsular contour bulging in keeping with with extracapsular extent of prostate carcinoma unchanged from the comparison studies  Workstation performed: [id number]             EKG, Pathology, and Other Studies Reviewed on Admission:   ctAllscripts / Epic Records Reviewed: Yes     ** Please Note: This note has been constructed using a voice recognition system   **

## 2022-02-18 NOTE — ED NOTES
Pt's daughter took clothing, but left his brace for the L hand   Brace is to ashish Asif RN  02/18/22 0025

## 2022-02-18 NOTE — CONSULTS
Consult for TF recommendation  Per nurse practitioner note, pt's daughter states that pt has not eating anything x9 days, which she states is possibly due to fear of indigestion per recent episode of nausea and voming (2/9/22)  Pt was placed on G-tube during last admission due to decreased PO intake and transferred to rehab, but daughter states that pt was on mechanical chopped in rehab  Daughter was not given instructions on home tube feeds  Recommend initiating Jevity 1 2 at 20 mL/hr  Increase by 10 mL every 6 hours to the goal rate of 70 mL/hr d/t prilosec and water flushes 100 mL every 4 hours  Provides 1,848 kcal, 85 g protein, and 1,843 mL fluid

## 2022-02-18 NOTE — ED NOTES
RN attempted to obtain information from patient using ,pt unwilling to answer questions        Paulina Syed RN  02/17/22 1930

## 2022-02-19 LAB
GLUCOSE SERPL-MCNC: 148 MG/DL (ref 65–140)
GLUCOSE SERPL-MCNC: 184 MG/DL (ref 65–140)
GLUCOSE SERPL-MCNC: 185 MG/DL (ref 65–140)

## 2022-02-19 PROCEDURE — 82948 REAGENT STRIP/BLOOD GLUCOSE: CPT

## 2022-02-19 RX ADMIN — AMLODIPINE BESYLATE 5 MG: 5 TABLET ORAL at 09:56

## 2022-02-19 RX ADMIN — FOLIC ACID 1 MG: 1 TABLET ORAL at 09:57

## 2022-02-19 RX ADMIN — DOXAZOSIN 2 MG: 2 TABLET ORAL at 09:56

## 2022-02-19 RX ADMIN — FINASTERIDE 5 MG: 5 TABLET, FILM COATED ORAL at 09:57

## 2022-02-19 RX ADMIN — DEXTROSE AND SODIUM CHLORIDE 75 ML/HR: 5; .9 INJECTION, SOLUTION INTRAVENOUS at 10:25

## 2022-02-19 RX ADMIN — INSULIN LISPRO 1 UNITS: 100 INJECTION, SOLUTION INTRAVENOUS; SUBCUTANEOUS at 18:40

## 2022-02-19 RX ADMIN — ACETAMINOPHEN 650 MG: 650 SUSPENSION ORAL at 20:32

## 2022-02-19 RX ADMIN — LISINOPRIL 5 MG: 5 TABLET ORAL at 09:57

## 2022-02-19 RX ADMIN — Medication 20 MG: at 09:57

## 2022-02-19 RX ADMIN — INSULIN LISPRO 1 UNITS: 100 INJECTION, SOLUTION INTRAVENOUS; SUBCUTANEOUS at 06:37

## 2022-02-19 NOTE — PROGRESS NOTES
Progress Note - Mehnaz Bowden 78 y o  male MRN: [id number]    Unit/Bed#: E5 -01 Encounter: [id number]      Subjective: The patient is comfortable at present  He denies chest pain or shortness of breath  He denies abdominal pain, nausea, or vomiting  He did not do very well with his swallowing evaluation yesterday  Physical Exam:   Temp:  [98 6 °F (37 °C)-98 9 °F (37 2 °C)] 98 6 °F (37 °C)  HR:  [71-72] 72  Resp:  [18] 18  BP: (100-121)/(51-62) 121/62    Gen:  Well-developed, frail, in no distress  Neck:  Supple  No lymphadenopathy, goiter or bruit  Heart[de-identified]  Regular rhythm  No murmur, gallop, or rub  Lungs:  Clear to auscultation and percussion  No wheezing, rales, or rhonchi  Abd:  Soft with active bowel sounds  No mass, tenderness, or organomegaly  Extremities:  No clubbing, cyanosis, or edema  No calf tenderness  Neuro:  Alert and conversant  Left hemiparesis is present  Skin:  Warm and dry  LABS:  No new labs  Assessment/Plan:  1  Anorexia  2  Dysphagia secondary to stroke  3  History history hemorrhage secondary to cerebral amyloid angiopathy  4  Prostate cancer  5  Anemia  6  Hyperglycemia, possible type 2 diabetes  7  Moderate protein calorie malnutrition    The patient did not do well with his dysphagia evaluation  Tube feeding has been resumed  The patient has not been able to eat adequately for the better part of 10 days  He has become moderately malnourished as result  With the institution of tube feeding, his glucose has become elevated and will need careful monitoring  We will continue with physical, occupational, and speech therapy  Palliative Care input is greatly appreciated        VTE Pharmacologic Prophylaxis: Reason for no pharmacologic prophylaxis Cerebral hemorrhage  VTE Mechanical Prophylaxis: sequential compression device

## 2022-02-19 NOTE — PLAN OF CARE
Problem: Potential for Falls  Goal: Patient will remain free of falls  Description: INTERVENTIONS:  - Educate patient/family on patient safety including physical limitations  - Instruct patient to call for assistance with activity   - Consult OT/PT to assist with strengthening/mobility   - Keep Call bell within reach  - Keep bed low and locked with side rails adjusted as appropriate  - Keep care items and personal belongings within reach  - Initiate and maintain comfort rounds  - Make Fall Risk Sign visible to staff  - Apply yellow socks and bracelet for high fall risk patients  - Consider moving patient to room near nurses station  Outcome: Progressing     Problem: MOBILITY - ADULT  Goal: Maintain or return to baseline ADL function  Description: INTERVENTIONS:  -  Assess patient's ability to carry out ADLs; assess patient's baseline for ADL function and identify physical deficits which impact ability to perform ADLs (bathing, care of mouth/teeth, toileting, grooming, dressing, etc )  - Assess/evaluate cause of self-care deficits   - Assess range of motion  - Assess patient's mobility; develop plan if impaired  - Assess patient's need for assistive devices and provide as appropriate  - Encourage maximum independence but intervene and supervise when necessary  - Involve family in performance of ADLs  - Assess for home care needs following discharge   - Consider OT consult to assist with ADL evaluation and planning for discharge  - Provide patient education as appropriate  Outcome: Progressing       Problem: Prexisting or High Potential for Compromised Skin Integrity  Goal: Skin integrity is maintained or improved  Description: INTERVENTIONS:  - Identify patients at risk for skin breakdown  - Assess and monitor skin integrity  - Assess and monitor nutrition and hydration status  - Monitor labs   - Assess for incontinence   - Turn and reposition patient  - Assist with mobility/ambulation  - Relieve pressure over bony prominences  - Avoid friction and shearing  - Provide appropriate hygiene as needed including keeping skin clean and dry  - Evaluate need for skin moisturizer/barrier cream  - Collaborate with interdisciplinary team   - Patient/family teaching  - Consider wound care consult   Outcome: Progressing     Problem: Nutrition/Hydration-ADULT  Goal: Nutrient/Hydration intake appropriate for improving, restoring or maintaining nutritional needs  Description: Monitor and assess patient's nutrition/hydration status for malnutrition  Collaborate with interdisciplinary team and initiate plan and interventions as ordered  Monitor patient's weight and dietary intake as ordered or per policy  Utilize nutrition screening tool and intervene as necessary  Determine patient's food preferences and provide high-protein, high-caloric foods as appropriate       INTERVENTIONS:  - Monitor oral intake, urinary output, labs, and treatment plans  - Assess nutrition and hydration status and recommend course of action  - Evaluate amount of meals eaten  - Assist patient with eating if necessary   - Allow adequate time for meals  - Recommend/ encourage appropriate diets, oral nutritional supplements, and vitamin/mineral supplements  - Order, calculate, and assess calorie counts as needed  - Recommend, monitor, and adjust tube feedings and TPN/PPN based on assessed needs  - Assess need for intravenous fluids  - Provide specific nutrition/hydration education as appropriate  - Include patient/family/caregiver in decisions related to nutrition  Outcome: Progressing

## 2022-02-20 LAB
GLUCOSE SERPL-MCNC: 129 MG/DL (ref 65–140)
GLUCOSE SERPL-MCNC: 157 MG/DL (ref 65–140)
GLUCOSE SERPL-MCNC: 181 MG/DL (ref 65–140)
GLUCOSE SERPL-MCNC: 257 MG/DL (ref 65–140)

## 2022-02-20 PROCEDURE — 82948 REAGENT STRIP/BLOOD GLUCOSE: CPT

## 2022-02-20 PROCEDURE — 99232 SBSQ HOSP IP/OBS MODERATE 35: CPT | Performed by: INTERNAL MEDICINE

## 2022-02-20 RX ADMIN — INSULIN LISPRO 2 UNITS: 100 INJECTION, SOLUTION INTRAVENOUS; SUBCUTANEOUS at 00:25

## 2022-02-20 RX ADMIN — Medication 20 MG: at 12:19

## 2022-02-20 RX ADMIN — FOLIC ACID 1 MG: 1 TABLET ORAL at 09:28

## 2022-02-20 RX ADMIN — AMLODIPINE BESYLATE 5 MG: 5 TABLET ORAL at 09:28

## 2022-02-20 RX ADMIN — ACETAMINOPHEN 650 MG: 650 SUSPENSION ORAL at 12:19

## 2022-02-20 RX ADMIN — DEXTROSE AND SODIUM CHLORIDE 75 ML/HR: 5; .9 INJECTION, SOLUTION INTRAVENOUS at 00:24

## 2022-02-20 RX ADMIN — FINASTERIDE 5 MG: 5 TABLET, FILM COATED ORAL at 09:28

## 2022-02-20 RX ADMIN — INSULIN LISPRO 1 UNITS: 100 INJECTION, SOLUTION INTRAVENOUS; SUBCUTANEOUS at 07:16

## 2022-02-20 RX ADMIN — LISINOPRIL 5 MG: 5 TABLET ORAL at 09:28

## 2022-02-20 RX ADMIN — DOXAZOSIN 2 MG: 2 TABLET ORAL at 09:28

## 2022-02-20 RX ADMIN — INSULIN LISPRO 1 UNITS: 100 INJECTION, SOLUTION INTRAVENOUS; SUBCUTANEOUS at 18:20

## 2022-02-20 NOTE — CASE MANAGEMENT
Case Management Assessment & Discharge Planning Note    Patient name Franck Butt  Location [address] MRN [id number]  : 1940 Date 2022       Current Admission Date: 2022  Current Admission Diagnosis:Failure to thrive in adult   Patient Active Problem List    Diagnosis Date Noted    Failure to thrive in adult 2022    History of cerebral hemorrhage 2022    Anemia 10/06/2021    SIRS (systemic inflammatory response syndrome) ([facility] [address] ) 10/03/2021    Renal cyst, right 10/02/2021    Leukocytosis 10/02/2021    Moderate protein-calorie malnutrition ([facility] [address] ) 10/01/2021    Elevated LFTs 2021    Dysphagia 2021    Hyperglycemia 2021    Goals of care, counseling/discussion 2021    Toxic metabolic encephalopathy     Cerebral amyloid angiopathy ([facility] [address] ) 2021    Cerebral hemorrhage ([facility] [address] ) 2021    Hypertension 2020    Lower urinary tract symptoms 2020    Prostate cancer ([facility] [address] ) 2020      LOS (days): 3  Geometric Mean LOS (GMLOS) (days): 2 60  Days to GMLOS:0 1     OBJECTIVE:    Risk of Unplanned Readmission Score: 20         Current admission status: Inpatient       Preferred Pharmacy:   [address] [address]  Phone: [phone] Fax: [phone]    Primary Care Provider: Cathie Mcgowan DO    Primary Insurance: [facility] [employer]  Secondary Insurance: [address] [place] [address] Blvd:  Active Health Care Agents    There are no active Health Care Agents on file         Advance Directives  Does patient have a [address] [facility] [address]?: No  Does patient have Advance Directives?: No         Readmission Root Cause  30 Day Readmission: No    Patient Information  Admitted from[de-identified] Home  Mental Status: Confused  During Assessment patient was accompanied by: Not accompanied during assessment  Assessment information provided by[de-identified] Daughter  Primary Caregiver: Child  Caregiver's Name[de-identified] Teresa Lang Relationship to Patient[de-identified] Family Member  Caregiver's Telephone Number[de-identified] [phone]  Support Systems: Daughter  Home entry access options  Select all that apply : Elevator  Type of Current Residence: Apartment  Floor Level: 8  Living Arrangements: Lives w/ Daughter    Activities of Daily Living Prior to Admission  Functional Status: Total dependent  Completes ADLs independently?: No  Level of ADL dependence: Total Dependent  Ambulates independently?: No  Level of ambulatory dependence:  Total Dependent  Does patient use assisted devices?: Yes  Assisted Devices (DME) used: Beena No lift  Does patient currently own DME?: Yes  What DME does the patient currently own?: Beena No lift  Does patient have a history of Outpatient Therapy (PT/OT)?: No  Does the patient have a history of Short-Term Rehab?: Yes ([facility])  Does patient have a history of HHC?: Yes ([facility])  Does patient currently have [facility]?: Yes ([facility])    Current Home Health Care  Type of Current Home Care Services: Home PT,Home OT  Current Home Health Agency[de-identified] [facility] Provider[de-identified] PCP    Patient Information Continued  Does patient have prescription coverage?: Yes  Does patient receive dialysis treatments?: No  Does patient have a history of substance abuse?: No  Does patient have a history of Mental Health Diagnosis?: No        DISCHARGE DETAILS:    Discharge planning discussed with[de-identified] dgt Maday Jerry  Freedom of Choice: Yes     CM contacted family/caregiver?: Yes  Were Treatment Team discharge recommendations reviewed with patient/caregiver?: Yes  Did patient/caregiver verbalize understanding of patient care needs?: Yes  Were patient/caregiver advised of the risks associated with not following Treatment Team discharge recommendations?: Yes    Contacts  Patient Contacts: Maday Jerry  Relationship to Patient[de-identified] Family  Contact Method: Phone  Phone Number: [phone]  Reason/Outcome: Continuity of [address]         Is the patient interested in [facility] at discharge?: Yes  Via Jewel Bowen requested[de-identified] [address] [facility] Agency Name[de-identified] [facility] Provider[de-identified] PCP  Home Health Services Needed[de-identified] Evaluate Functional Status and Safety,Gait/ADL Training,Strengthening/Theraputic Exercises to Improve Function,Administration of IV, IM or SC Medications  Homebound Criteria Met[de-identified] Requires the Assistance of Another Person for Safe Ambulation or to Leave the Home,Uses an Assist Device (i e  cane, walker, etc)  Supporting Clincal Findings[de-identified] Limited Endurance,Fatigues Easliy in Short Distances,Cognitive Deficit Requiring the Assistance of Others,Bed Bound or Wheelchair Bound    DME Referral Provided  Referral made for DME?: No    Treatment Team Recommendation: Home  Discharge Destination Plan[de-identified] Home  Transport at Discharge : BLS Ambulance

## 2022-02-20 NOTE — PROGRESS NOTES
Progress Note - Domenica Acuna 78 y o  male MRN: [id number]    Unit/Bed#: E5 -01 Encounter: [id number]      Subjective: The patient feels pretty well  He slept okay  He denies any chest pain, shortness of breath, abdominal pain, nausea, or vomiting  Physical Exam:   Temp:  [98 8 °F (37 1 °C)-99 7 °F (37 6 °C)] 99 7 °F (37 6 °C)  HR:  [67-83] 78  Resp:  [17-18] 18  BP: (119-153)/(49-62) 153/62    Gen:   Well-developed, frail, in no distress  Neck: supple  No lymphadenopathy, goiter, or bruit  Heart: regular rhythm  No murmur, gallop, or rub  Lungs: clear to auscultation and percussion  No wheezing, rales, or rhonchi   Abd:  Soft with active bowel sounds  No mass, tenderness, or organomegaly  Peg tube is noted  Extremities:  No clubbing, cyanosis, or edema  No calf tenderness  Neuro:  Alert and conversant  Left hemiparesis is present  Skin:  Warm and dry  LABS:   No new labs  Patient Active Problem List   Diagnosis    Prostate cancer ([facility] [address] )    Lower urinary tract symptoms    Hypertension    Cerebral hemorrhage (HCC)    Cerebral amyloid angiopathy (HCC)    Toxic metabolic encephalopathy    Hyperglycemia    Goals of care, counseling/discussion    Dysphagia    Elevated LFTs    Moderate protein-calorie malnutrition (HCC)    Renal cyst, right    Leukocytosis    SIRS (systemic inflammatory response syndrome) (HCC)    Anemia    Failure to thrive in adult    History of cerebral hemorrhage       Assessment/Plan:    1  Anorexia  2  Dysphagia secondary to previous stroke   3  History of cerebral hemorrhage secondary to cerebral amyloid angiopathy  4  Prostate cancer  5  Anemia   6  Hyperglycemia, likely type 2 diabetes  7  Moderate protein calorie malnutrition  The patient is stable at present  He is tolerating tube feeding well  When he was in rehab, his swallowing function improved well enough that tube feeding was stopped    When re-evaluated on this admission, he did not do well and tube feeding was resumed  Speech therapy will be continued  Physical and occupational therapy has been requested  I will discuss the situation with the patient's family  After the patient's therapy evaluations, a decision will be made regarding discharge to home or returned to short-term rehab  VTE Pharmacologic Prophylaxis: Reason for no pharmacologic prophylaxis Previous cerebral hemorrhage    VTE Mechanical Prophylaxis: sequential compression device

## 2022-02-20 NOTE — PLAN OF CARE
Problem: Potential for Falls  Goal: Patient will remain free of falls  Description: INTERVENTIONS:  - Educate patient/family on patient safety including physical limitations  - Instruct patient to call for assistance with activity   - Consult OT/PT to assist with strengthening/mobility   - Keep Call bell within reach  - Keep bed low and locked with side rails adjusted as appropriate  - Keep care items and personal belongings within reach  - Initiate and maintain comfort rounds  - Make Fall Risk Sign visible to staff  - Offer Toileting every  Hours, in advance of need  - Initiate/Maintain alarm  - Obtain necessary fall risk management equipment:   - Apply yellow socks and bracelet for high fall risk patients  - Consider moving patient to room near nurses station  Outcome: Progressing     Problem: MOBILITY - ADULT  Goal: Maintain or return to baseline ADL function  Description: INTERVENTIONS:  -  Assess patient's ability to carry out ADLs; assess patient's baseline for ADL function and identify physical deficits which impact ability to perform ADLs (bathing, care of mouth/teeth, toileting, grooming, dressing, etc )  - Assess/evaluate cause of self-care deficits   - Assess range of motion  - Assess patient's mobility; develop plan if impaired  - Assess patient's need for assistive devices and provide as appropriate  - Encourage maximum independence but intervene and supervise when necessary  - Involve family in performance of ADLs  - Assess for home care needs following discharge   - Consider OT consult to assist with ADL evaluation and planning for discharge  - Provide patient education as appropriate  Outcome: Progressing  Goal: Maintains/Returns to pre admission functional level  Description: INTERVENTIONS:  - Perform BMAT or MOVE assessment daily    - Set and communicate daily mobility goal to care team and patient/family/caregiver     - Collaborate with rehabilitation services on mobility goals if consulted  - Perform Range of Motion times a day  - Reposition patient every  hours  - Dangle patient  times a day  - Stand patient times a day  - Ambulate patient times a day  - Out of bed to chair  times a day   - Out of bed for meals times a day  - Out of bed for toileting  - Record patient progress and toleration of activity level   Outcome: Progressing     Problem: Prexisting or High Potential for Compromised Skin Integrity  Goal: Skin integrity is maintained or improved  Description: INTERVENTIONS:  - Identify patients at risk for skin breakdown  - Assess and monitor skin integrity  - Assess and monitor nutrition and hydration status  - Monitor labs   - Assess for incontinence   - Turn and reposition patient  - Assist with mobility/ambulation  - Relieve pressure over bony prominences  - Avoid friction and shearing  - Provide appropriate hygiene as needed including keeping skin clean and dry  - Evaluate need for skin moisturizer/barrier cream  - Collaborate with interdisciplinary team   - Patient/family teaching  - Consider wound care consult   Outcome: Progressing     Problem: Nutrition/Hydration-ADULT  Goal: Nutrient/Hydration intake appropriate for improving, restoring or maintaining nutritional needs  Description: Monitor and assess patient's nutrition/hydration status for malnutrition  Collaborate with interdisciplinary team and initiate plan and interventions as ordered  Monitor patient's weight and dietary intake as ordered or per policy  Utilize nutrition screening tool and intervene as necessary  Determine patient's food preferences and provide high-protein, high-caloric foods as appropriate       INTERVENTIONS:  - Monitor oral intake, urinary output, labs, and treatment plans  - Assess nutrition and hydration status and recommend course of action  - Evaluate amount of meals eaten  - Assist patient with eating if necessary   - Allow adequate time for meals  - Recommend/ encourage appropriate diets, oral nutritional supplements, and vitamin/mineral supplements  - Order, calculate, and assess calorie counts as needed  - Recommend, monitor, and adjust tube feedings and TPN/PPN based on assessed needs  - Assess need for intravenous fluids  - Provide specific nutrition/hydration education as appropriate  - Include patient/family/caregiver in decisions related to nutrition  Outcome: Progressing

## 2022-02-21 LAB
GLUCOSE SERPL-MCNC: 112 MG/DL (ref 65–140)
GLUCOSE SERPL-MCNC: 134 MG/DL (ref 65–140)
GLUCOSE SERPL-MCNC: 138 MG/DL (ref 65–140)
GLUCOSE SERPL-MCNC: 189 MG/DL (ref 65–140)

## 2022-02-21 PROCEDURE — 99232 SBSQ HOSP IP/OBS MODERATE 35: CPT | Performed by: INTERNAL MEDICINE

## 2022-02-21 PROCEDURE — 97167 OT EVAL HIGH COMPLEX 60 MIN: CPT

## 2022-02-21 PROCEDURE — 82948 REAGENT STRIP/BLOOD GLUCOSE: CPT

## 2022-02-21 PROCEDURE — 92526 ORAL FUNCTION THERAPY: CPT

## 2022-02-21 PROCEDURE — 97163 PT EVAL HIGH COMPLEX 45 MIN: CPT

## 2022-02-21 RX ADMIN — DOXAZOSIN 2 MG: 2 TABLET ORAL at 09:08

## 2022-02-21 RX ADMIN — FINASTERIDE 5 MG: 5 TABLET, FILM COATED ORAL at 09:08

## 2022-02-21 RX ADMIN — INSULIN LISPRO 1 UNITS: 100 INJECTION, SOLUTION INTRAVENOUS; SUBCUTANEOUS at 18:27

## 2022-02-21 RX ADMIN — AMLODIPINE BESYLATE 5 MG: 5 TABLET ORAL at 09:08

## 2022-02-21 RX ADMIN — LISINOPRIL 5 MG: 5 TABLET ORAL at 09:08

## 2022-02-21 RX ADMIN — FOLIC ACID 1 MG: 1 TABLET ORAL at 09:08

## 2022-02-21 RX ADMIN — Medication 20 MG: at 09:19

## 2022-02-21 NOTE — PROGRESS NOTES
Progress Note - Octavio Villagran 78 y o  male MRN: [id number]    Unit/Bed#: E5 -01 Encounter: [id number]      Subjective:   the patient is feeling reasonably well  He denies chest pain or shortness of breath  He has no abdominal pain, nausea, or vomiting  He has had no problems with his tube feeding  Physical Exam:   Temp:  [99 2 °F (37 3 °C)-99 6 °F (37 6 °C)] 99 2 °F (37 3 °C)  HR:  [80-85] 80  Resp:  [18] 18  BP: (102-132)/(54-63) 102/54    Gen:   Well-developed, well-nourished, in no distress  Neck:  Supple  No lymphadenopathy, goiter, or bruit  Heart:  Regular rhythm  I heard no murmur, gallop, or rub  Lungs:  Clear to auscultation and percussion  No wheezing, rales, or rhonchi  Abd:   Soft with active bowel sounds  No mass, tenderness, or organomegaly  Extremities:  No clubbing, cyanosis, or edema  No calf tenderness  Neuro:  Alert and oriented  Left hemiparesis is present  Skin:  Warm and dry  LABS:   No new labs  Assessment/Plan:  1  Dysphagia secondary to stroke   2  Anorexia, primarily related to 1   3   History of cerebral hemorrhage  4  Cerebral amyloid angiopathy  5  Prostate cancer  6  Anemia  7  Hyperglycemia , improved  8  Moderate protein calorie malnutrition  The patient is doing pretty well  Tube feeding will be continued  The patient's family desires that he return home rather than return to rehab  We will continue with tube feeding on an intermittent basis  Physical, occupational, and speech therapy will be continued at home  Hopefully, he will be suitable for discharge tomorrow      VTE Pharmacologic Prophylaxis: Reason for no pharmacologic prophylaxis History of brain hemorrhage  VTE Mechanical Prophylaxis: sequential compression device

## 2022-02-21 NOTE — PLAN OF CARE
Problem: OCCUPATIONAL THERAPY ADULT  Goal: Performs self-care activities at highest level of function for planned discharge setting  See evaluation for individualized goals  Description: Treatment Interventions: ADL retraining,Functional transfer training,UE strengthening/ROM,Patient/family training,Endurance training,Cognitive reorientation,Equipment evaluation/education,Compensatory technique education,Activityengagement,Energy conservation          See flowsheet documentation for full assessment, interventions and recommendations  2/21/2022 1520 by Brittany Ly OT  Note: Limitation: Decreased ADL status,Decreased Safe judgement during ADL,Decreased UE strength,Decreased cognition,Decreased endurance,Decreased sensation,Decreased self-care trans,Decreased high-level ADLs,Decreased fine motor control,Non-func L UE  Prognosis: Guarded  Assessment: Pt is a 78 y o  male seen for OT evaluation s/p admit to [facility] on 2/17/2022 w/ Failure to thrive in adult, nausea and vomitting  Comorbidities affecting pt's functional performance at time of assessment include: h/o cerebral hemorrhage 9/2021, dysphagia w/ PEG 9/30/2021, prostate CA  Pt just returned home on 2/9/2022 from rehab  Personal factors affecting pt at time of IE include:difficulty performing ADLS, difficulty performing IADLS , limited insight into deficits and decreased initiation and engagement   Prior to admission, pt was at home w/ daughter and assist w/ ADLs, dependent w/ carlos lift transfer OOB, dependent w/ toileting, assist self-feeding   Upon evaluation: Pt requires dependent assist x2 rolling in bed w/ increased time to complete, MAX assist UB ADLS and grooming, total assist toileting and LB ADLS 2* the following deficits impacting occupational performance: MAX assist steadying support while EOB w/ posterior lean, impaired insight and safety awareness, multiple lines, fall risk, L UE hemiparesis from past CVA w/ splint in place, impaired functional reach, limited AROM R UE w/ decreased coordination  Pt to benefit from continued skilled OT tx while in the hospital to address deficits as defined above and maximize level of functional independence w ADL's and functional mobility  Occupational Performance areas to address include: eating, grooming, bathing/shower, toilet hygiene, dressing, health maintenance, functional mobility and clothing management  Recommend carlos lift OOB  From OT standpoint, recommendation at time of d/c would be return home w/ continued 24/7 support/assist and HOME OT vs  STR if family unable to provide increased support/assist  Recommend w/c and hospital bed for home  The patient's raw score on the AM-PAC Daily Activity inpatient short form low function score is 12, standardized score is Low Function Daily Activity Standardized Score: 21 38  Patients with a standardized score less than 39 4 are likely to benefit from discharge to post-acute rehab services  Please refer to the recommendation of the Occupational Therapist for safe discharge planning  Recommendation: Geriatric Consult  OT Discharge Recommendation: Home with home health rehabilitation (24/7 support/assist)  OT - OK to Discharge: Yes (when medically stable)    2/21/2022 1520 by Ken Woody OT  Note: Limitation: Decreased ADL status,Decreased Safe judgement during ADL,Decreased UE strength,Decreased cognition,Decreased endurance,Decreased sensation,Decreased self-care trans,Decreased high-level ADLs,Decreased fine motor control,Non-func L UE  Prognosis: Guarded  Assessment: Pt is a 78 y o  male seen for OT evaluation s/p admit to [facility] on 2/17/2022 w/ Failure to thrive in adult, nausea and vomitting  Comorbidities affecting pt's functional performance at time of assessment include: h/o cerebral hemorrhage 9/2021, dysphagia w/ PEG 9/30/2021, prostate CA  Pt just returned home on 2/9/2022 from rehab    Personal factors affecting pt at time of IE include:difficulty performing ADLS, difficulty performing IADLS , limited insight into deficits and decreased initiation and engagement   Prior to admission, pt was at home w/ daughter and assist w/ ADLs, dependent w/ carlos lift transfer OOB, dependent w/ toileting, assist self-feeding  Upon evaluation: Pt requires dependent assist x2 rolling in bed w/ increased time to complete, MAX assist UB ADLS and grooming, total assist toileting and LB ADLS 2* the following deficits impacting occupational performance: MAX assist steadying support while EOB w/ posterior lean, impaired insight and safety awareness, multiple lines, fall risk, L UE hemiparesis from past CVA w/ splint in place, impaired functional reach, limited AROM R UE w/ decreased coordination  Pt to benefit from continued skilled OT tx while in the hospital to address deficits as defined above and maximize level of functional independence w ADL's and functional mobility  Occupational Performance areas to address include: eating, grooming, bathing/shower, toilet hygiene, dressing, health maintenance, functional mobility and clothing management  Recommend carlos lift OOB  From OT standpoint, recommendation at time of d/c would be return home w/ continued 24/7 support/assist and HOME OT vs  STR if family unable to provide increased support/assist  Recommend w/c and hospital bed for home  The patient's raw score on the AM-PAC Daily Activity inpatient short form low function score is 12, standardized score is Low Function Daily Activity Standardized Score: 21 38  Patients with a standardized score less than 39 4 are likely to benefit from discharge to post-acute rehab services  Please refer to the recommendation of the Occupational Therapist for safe discharge planning    Recommendation: Geriatric Consult  OT Discharge Recommendation: Home with home health rehabilitation (24/7 support/assist)  OT - OK to Discharge: Yes (when medically stable)

## 2022-02-21 NOTE — CASE MANAGEMENT
Case Management Progress Note    Patient name Mechelle Corporal  Location [facility] [address] [facility] MS 54-* MRN [id number]  : 1940 Date 2022       LOS (days): 4  Geometric Mean LOS (GMLOS) (days): 2 60  Days to GMLOS:-0 9        OBJECTIVE:        Current admission status: Inpatient  Preferred Pharmacy:   [address], [address] [place] [address] Drive  [address] [facility] [address]  Phone: [phone] Fax: [phone]    Primary Care Provider: Dash Whelan DO    Primary Insurance: [facility] [employer]  Secondary Insurance: [address] NOTE:    CM sent tube feed script in parachute for Jevity 1 0 via bolus tube feed for peg tube  CM sent resumption of care referral to  [facility] for [facility] and nurse for tube feed teaching  CM will continue to follow  Update:    CM met with patient's dgt Richa Voss at bedside to discuss discharge planning  Richa Voss is home Monday through Friday with an aid on Saturday and   Patient has [facility] for nursing and therapy  Tube feed equipment not yet delivered  Per [facility], equipment to be delivered tomorrow  No time given for equipment tomorrow  Patient's dgt was not given a time either  CM requested a nurse visit Wednesday to ensure tube feed equipment will be available

## 2022-02-21 NOTE — PHYSICAL THERAPY NOTE
PHYSICAL THERAPY EVALUATION          Patient Name: Candelario Jay  [id number] Date: 2/21/2022   PT EVALUATION [id number]    78 y o     [id number]    Adult failure to thrive [R62 7]  Failure to thrive in adult [R62 7]  Goals of care, counseling/discussion [Z71 89]  Decreased oral intake [R63 8]  History of cerebral hemorrhage [Z86 79]  Dysphagia, unspecified type [R13 10]    Past Medical History:   Diagnosis Date    Hypertension     Prostate cancer ([facility] [address] )     no surgery required     Past Surgical History:   Procedure Laterality Date    GASTROSTOMY TUBE PLACEMENT N/A 9/30/2021    Procedure: INSERTION PEG TUBE;  Surgeon: Yogesh Booth DO;  [facility]: [facility];  Service: General    HERNIA REPAIR          02/21/22 0958   PT Last Visit   PT Visit Date 02/21/22   Note Type   Note type Evaluation   Pain Assessment   Pain Assessment Tool FLACC   Pain Location/Orientation Orientation: Left; Location: Leg   Pain Rating: FLACC (Rest) - Face 0   Pain Rating: FLACC (Rest) - Legs 0   Pain Rating: FLACC (Rest) - Activity 0   Pain Rating: FLACC (Rest) - Cry 0   Pain Rating: FLACC (Rest) - Consolability 0   Score: FLACC (Rest) 0   Pain Rating: FLACC (Activity) - Face 1   Pain Rating: FLACC (Activity) - Legs 0   Pain Rating: FLACC (Activity) - Activity 0   Pain Rating: FLACC (Activity) - Cry 1   Pain Rating: FLACC (Activity) - Consolability 0   Score: FLACC (Activity) 2   Restrictions/Precautions   Other Precautions Contact/isolation;Cognitive; Chair Alarm; Bed Alarm;Multiple lines; Fall Risk;Pain   Home Living   Type of [address] Elevator   Home Equipment Cane;Mechanical lift   Additional Comments obtained via chart review   Prior Function   Level of [place] Total dependent   Lives With Daughter   Receives Help From Family;Home health   Comments obtained via chart review   per chart, pt dependent pta w use of carlos for OOB General   Additional Pertinent History pt admitted 2/17/22 for failure to thrive in adult  up w assist orders  hx of CA  Was prev at [facility] from 9/16-10/12/21 for ICH, dc to rehab from 10/12/21-2/9/22 and now home w home services  Cognition   Overall Cognitive Status Impaired   Arousal/Participation Responsive   Orientation Level Oriented to person;Disoriented to place; Disoriented to time;Disoriented to situation   Memory Unable to assess   Following Commands Follows one step commands inconsistently   Comments confused  does not appropriately respond to questions, perseverates   Subjective   Subjective "[birth year]"   RLE Assessment   RLE Assessment X  (unable to assess d/t cognition)   LLE Assessment   LLE Assessment X  (PROM WNL)   Bed Mobility   Supine to Sit 1  Dependent   Additional items Assist x 2;HOB elevated; Increased time required;LE management; Other   Sit to Supine 1  Dependent   Additional items Assist x 2; Increased time required;Verbal cues;LE management   Balance   Static Sitting Zero   Activity Tolerance   Activity Tolerance Treatment limited secondary to medical complications (Comment)   Medical Staff Made Aware Pa[facility] COLLADO   Nurse Made Aware [facility] RN   Assessment   Prognosis Guarded   Assessment Poppy Mcdonough is a 78 y o  male admitted to [address] on 2/17/2022 for Failure to thrive in adult  PT was consulted and pt was seen on 2/21/2022 for mobility assessment and d/c planning  Pt presents w high fall risk, contact precautions, multiple lines (siva, PEG, catheter)  Per chart is dependent at baseline w use of carlos lift  Pt is currently functioning at a dependent assistance x2 level for bed mobility  Pt demonstrated impaired cognition and generalized weakness impacting participation in therapy  No evidence of righting reactions w unsupported sitting EOB, and not able to self correct despite cues given impaired cognition  Appears to be functioning at baseline   At this time PT recommendations for d/c are home w continued 24/7 support vs LTC pending caregiver abilities     Barriers to Discharge Other (Comment)  (unable to identify from patient)   Plan   PT Frequency Other (Comment)  (d/c PT)   Recommendation   PT Discharge Recommendation No rehabilitation needs   Equipment Recommended Other (Comment)  (hospital bed)   Obie 8 in Bed Without Bedrails 1   Lying on Back to Sitting on Edge of Flat Bed 1   Moving Bed to Chair 1   Standing Up From Chair 1   Walk in Room 1   Climb 3-5 Stairs 1   Basic Mobility Inpatient Raw Score 6   Turning Head Towards Sound 3   Follow Simple Instructions 1   Low Function Basic Mobility Raw Score 10   Low Function Basic Mobility Standardized Score 14 65   Highest Level Of Mobility   -HL Goal 2: Bed activities/Dependent transfer   JH-HLM Highest Level of Mobility 3: Sit at edge of bed   JH-HLM Goal Achieved Yes   History: co - morbidities, social background (?24/7), past experience (recently dc from rehab), fall risk, use of assistive device, assist for adl's, cognition, multiple lines  Exam: impairments in systems including musculoskeletal (PROM, unable to MMT), neuromuscular (balance, bed level transfers, motor function and sensation- reactive to LT), am-pac, cognition  Clinical: unstable/unpredictable  Complexity:high      Jeremie Forward, PT

## 2022-02-21 NOTE — OCCUPATIONAL THERAPY NOTE
Occupational Therapy Evaluation     Patient Name: Sanya Morgan  IKCLP'A Date: 2/21/2022  Problem List  Principal Problem:    Failure to thrive in adult  Active Problems:    Prostate cancer ([facility] )    Hypertension    Cerebral hemorrhage ([facility] )    Cerebral amyloid angiopathy ([facility] )    Hyperglycemia    Goals of care, counseling/discussion    Dysphagia    Moderate protein-calorie malnutrition ([facility] )    History of cerebral hemorrhage    Past Medical History  Past Medical History:   Diagnosis Date    Hypertension     Prostate cancer ([facility] )     no surgery required     Past Surgical History  Past Surgical History:   Procedure Laterality Date    GASTROSTOMY TUBE PLACEMENT N/A 9/30/2021    Procedure: INSERTION PEG TUBE;  Surgeon: Humphrey Oseguera DO;  [facility];  Service: General    HERNIA REPAIR               02/21/22 0957   OT Last Visit   OT Visit Date 02/21/22   Note Type   Note type Evaluation   Restrictions/Precautions   Braces or Orthoses Splint  (L resting hand soft splint)   Other Precautions Contact/isolation;Cognitive; Bed Alarm; Chair Alarm;Multiple lines; Fall Risk;Pain  (peg tube)   Pain Assessment   Pain Assessment Tool 0-10   Pain Score No Pain   Home Living   Type of Home Apartment   Home Layout Elevator   Home Equipment Mechanical lift  (carlos lift)   Additional Comments pt cynthia historian unable to provide history; per CM note lives w/ daughter who provides 24/7 care and use of carlos lift for OOB or mainly bed bound returned home from rehab on 2/9/2022 and was receiving home therapies   Prior Function   Level of [place] Needs assistance with IADLs; Needs assistance with ADLs and functional mobility; Total dependent   Lives With Daughter   Receives Help From Family;Home health   ADL Assistance Needs assistance   IADLs Needs assistance   Falls in the last 6 months   (unable to report)   Vocational Retired   Comments pt cynthia historian, per chart review pt w/ carlos at home, unsure of home often pt gets out of bed as pt stating "cama"   Lifestyle   Autonomy per pt assist self-feeding and grooming, assist w/ UB ADLs, total assist LB ADLs, assist bed mobility, carlos lift transfers   Reciprocal Relationships daughter   Service to Others retired   Intrinsic Gratification watch tv   ADL   Where Assessed Supine, bed   Eating Assistance 2  Maximal Assistance   Grooming Assistance 2  [address] 2  Maximal Assistance   LB [address] 1  Total Assistance   [address] 2  Maximal Assistance    [facility] 1  Total [address]  1  Total [address] 1  Total Assistance   Bed Mobility   Supine to Sit 1  Dependent   Additional items Assist x 2; Increased time required;Verbal cues;LE management; Bedrails;HOB elevated   Sit to Supine 1  Dependent   Additional items Assist x 2; Increased time required;Verbal cues;LE management; Bedrails;HOB elevated   Additional Comments increased time to complete, unable to maintain sitting balance while EOB w/ posterior lean   Transfers   Sit to Stand Unable to assess   Additional Comments recommend carlos lift OOB   Balance   Static Sitting Zero   Activity Tolerance   Activity Tolerance Treatment limited secondary to medical complications (Comment)   Medical Staff Made Aware PT Khadra;  Pt seen for co-session with skilled Physical therapist 2* clinically unstable presentation, medical complexity, new precautions, performance deficits/functional limitations, impaired cognition/safety awareness, limited activity tolerance and present impairments which are a regression from patient patient's baseline and impacting overall occupational performance   Nurse Made Aware appropriate to see per José Manuel STREETER Offer   RUE Assessment   RUE Assessment WFL  (impaired elevation, 3+/5   3+/5)   LUE Assessment   LUE Assessment X  (limited from h/o CVA, resting splint,  2/5)   LUE Overall AROM   L Shoulder Flexion 70   L Elbow Flexion 60   L Elbow Supination 10   L Elbow Pronation 15   L Mass Grasp 2/5   LUE Strength   LUE Overall Strength Deficits   L Shoulder Flexion 2-/5   L Elbow Flexion 2/5   L Elbow Extension 2/5   L Forearm Pronation 2-/5   L Forearm Supination 2-/5   Hand Function   Gross Motor Coordination Impaired   Fine Motor Coordination Impaired   Sensation   Light Touch Partial deficits in the LUE   Proprioception   Proprioception Partial deficits in the LUE   Vision-Basic Assessment   Current Vision Wears glasses all the time   Vision - Complex Assessment   Ocular Range of Motion [facility]   Additional Comments unable to report how many digits held up   Cognition   Overall Cognitive Status Impaired   Arousal/Participation Responsive   Attention Attends with cues to redirect   Orientation Level Oriented to person;Disoriented to place; Disoriented to time;Disoriented to situation   Memory Decreased recall of precautions;Decreased recall of recent events;Decreased short term memory;Decreased recall of biographical information   Following Commands Follows one step commands inconsistently   Comments pt w/ increased confusion, impaired insight and safety awareness, primarily Polish speaking does speak some english, difficulty following direction   Assessment   Limitation Decreased ADL status; Decreased Safe judgement during ADL;Decreased UE strength;Decreased cognition;Decreased endurance;Decreased sensation;Decreased self-care trans;Decreased high-level ADLs; Decreased fine motor control;Non-func L UE   Prognosis Guarded   Assessment Pt is a 78 y o  male seen for OT evaluation s/p admit to [facility] on 2/17/2022 w/ Failure to thrive in adult, nausea and vomitting  Comorbidities affecting pt's functional performance at time of assessment include: h/o cerebral hemorrhage 9/2021, dysphagia w/ PEG 9/30/2021, prostate CA  Pt just returned home on 2/9/2022 from rehab    Personal factors affecting pt at time of IE include:difficulty performing ADLS, difficulty performing IADLS , limited insight into deficits and decreased initiation and engagement   Prior to admission, pt was at home w/ daughter and assist w/ ADLs, dependent w/ carlos lift transfer OOB, dependent w/ toileting, assist self-feeding  Upon evaluation: Pt requires dependent assist x2 rolling in bed w/ increased time to complete, MAX assist UB ADLS and grooming, total assist toileting and LB ADLS 2* the following deficits impacting occupational performance: MAX assist steadying support while EOB w/ posterior lean, impaired insight and safety awareness, multiple lines, fall risk, L UE hemiparesis from past CVA w/ splint in place, impaired functional reach, limited AROM R UE w/ decreased coordination  Pt to benefit from continued skilled OT tx while in the hospital to address deficits as defined above and maximize level of functional independence w ADL's and functional mobility  Occupational Performance areas to address include: eating, grooming, bathing/shower, toilet hygiene, dressing, health maintenance, functional mobility and clothing management  Recommend carlos lift OOB  From OT standpoint, recommendation at time of d/c would be return home w/ continued 24/7 support/assist and HOME OT vs  STR if family unable to provide increased support/assist  Recommend w/c and hospital bed for home  The patient's raw score on the AM-PAC Daily Activity inpatient short form low function score is 12, standardized score is Low Function Daily Activity Standardized Score: 21 38  Patients with a standardized score less than 39 4 are likely to benefit from discharge to post-acute rehab services  Please refer to the recommendation of the Occupational Therapist for safe discharge planning     Goals   Patient Goals none expressed at this time 2* cognition   LTG Time Frame 10-14   Long Term Goal please see below goals   Plan   Treatment Interventions ADL retraining;Functional transfer training;UE strengthening/ROM; Patient/family training; Endurance training;Cognitive reorientation;Equipment evaluation/education; Compensatory technique education; Activityengagement; Energy conservation   Goal Expiration Date 03/07/22   OT Frequency 1-2x/wk   Recommendation   Recommendation Geriatric Consult   OT Discharge Recommendation Home with home health rehabilitation  (24/7 support/assist)   OT - OK to Discharge Yes  (when medically stable)   Additional Comments  24/7 support/assist and HOME OT and family support vs  potential LTC w/ STR if family unable to provide continued support/assist   AM-PAC Daily Activity Inpatient   Lower Body Dressing 1   Bathing 1   Toileting 1   Upper Body Dressing 1   Grooming 2   Eating 1   Daily Activity Raw Score 7   Turning Head Towards Sound 3   Follow Simple Instructions 2   Low Function Daily Activity Raw Score 12   Low Function Daily Activity Standardized Score 21 38   AM-PAC Applied Cognition Inpatient   Following a Speech/Presentation 1   Understanding Ordinary Conversation 3   Taking Medications 1   Remembering Where Things Are Placed or Put Away 2   Remembering List of 4-5 Errands 1   Taking Care of Complicated Tasks 1   Applied Cognition   Raw Score 9   Applied Cognition Standardized Score 22 48     Occupational Therapy Goals to be met in 10-14 days:  1) Pt will improve activity tolerance to F for 20 min txment sessions to enhance ADLs  2) Pt will complete ADLs/self care w/ min assist grooming and mod assist UB ADLs   3) Pt will complete toileting w/ mod assist w/ G hygiene/thoroughness using DME PRN  5) Pt will engage in ongoing cognitive assessment w/ G participation to A w/ safe d/c planning/recommendations  6) Pt will demonstrate G carryover of pt/caregiver education and training as appropriate w/ mod I  w/ G tolerance  7) Pt will engage in depression screen/leisure interest checklist w/ G participation to monitor s/s depression and ID 3 positive coping strategies to A w/ emotional regulation and management  8) Pt will demonstrate 100% carryover of E C  techniques w/ mod I t/o fx'l I/ADL/leisure tasks w/o cues s/p skilled education  9) Pt will tolerate bed mobility and EOB seated tasks w/ min A for 20 mins to engage in fx'l I/ADL/leisure tasks w/ min A w/ min cues  10) Pt will demonstrate improved b/l UE HEP program to prevent contracture formation and maintain ROM      Documentation completed by: Shirley Waddell MS, OTR/L

## 2022-02-21 NOTE — SPEECH THERAPY NOTE
Speech Language/Pathology    Speech/Language Pathology Progress Note    Patient Name: Candelario Jay  RVLIB'V Date: 2/21/2022     Problem List  Principal Problem:    Failure to thrive in adult  Active Problems:    Prostate cancer ([facility] )    Hypertension    Cerebral hemorrhage ([facility] )    Cerebral amyloid angiopathy ([facility] )    Hyperglycemia    Goals of care, counseling/discussion    Dysphagia    Moderate protein-calorie malnutrition ([facility] )    History of cerebral hemorrhage       Past Medical History  Past Medical History:   Diagnosis Date    Hypertension     Prostate cancer ([facility] )     no surgery required        Past Surgical History  Past Surgical History:   Procedure Laterality Date    GASTROSTOMY TUBE PLACEMENT N/A 9/30/2021    Procedure: INSERTION PEG TUBE;  Surgeon: Yogesh Booth DO;  [facility];  Service: General    HERNIA REPAIR         Subjective:  Pt was alert, confused, and positioned upright      Objective:  Pt was seen for f/u dysphagia therapy  Congested cough noted  Stated he did not want to eat  With much encouragement, Trialed apple sauce and honey thick liquid by tsp  Limited mouth opening with maximal encouragement  Pt held trials in oral cavity  Inconsistently followed cues to swallow  Minimal puree was eventually transferred  Suctioned honey thick out of oral cavity  Discontinued trials and completed oral care  Pt continued to stated he was not hungry and didn't want to eat  Assessment:  Congested cough  Reduced intake and no desire for po  Plan/Recommendations:  Continue NPO with PEG for primary  Continue with oral care  ST will f/u

## 2022-02-22 ENCOUNTER — TELEPHONE (OUTPATIENT)
Dept: NEUROLOGY | Facility: CLINIC | Age: 82
End: 2022-02-22

## 2022-02-22 PROBLEM — I69.391 DYSPHAGIA DUE TO OLD STROKE: Status: ACTIVE | Noted: 2021-09-29

## 2022-02-22 PROBLEM — R73.9 HYPERGLYCEMIA: Status: RESOLVED | Noted: 2021-09-22 | Resolved: 2022-02-22

## 2022-02-22 PROBLEM — Z71.89 GOALS OF CARE, COUNSELING/DISCUSSION: Status: RESOLVED | Noted: 2021-09-22 | Resolved: 2022-02-22

## 2022-02-22 LAB
DME PARACHUTE DELIVERY DATE ACTUAL: NORMAL
DME PARACHUTE DELIVERY DATE ACTUAL: NORMAL
DME PARACHUTE DELIVERY DATE EXPECTED: NORMAL
DME PARACHUTE DELIVERY DATE REQUESTED: NORMAL
DME PARACHUTE DELIVERY DATE REQUESTED: NORMAL
DME PARACHUTE ITEM DESCRIPTION: NORMAL
DME PARACHUTE ORDER STATUS: NORMAL
DME PARACHUTE ORDER STATUS: NORMAL
DME PARACHUTE SUPPLIER NAME: NORMAL
DME PARACHUTE SUPPLIER NAME: NORMAL
DME PARACHUTE SUPPLIER PHONE: NORMAL
DME PARACHUTE SUPPLIER PHONE: NORMAL
GLUCOSE SERPL-MCNC: 110 MG/DL (ref 65–140)
GLUCOSE SERPL-MCNC: 138 MG/DL (ref 65–140)
GLUCOSE SERPL-MCNC: 145 MG/DL (ref 65–140)
GLUCOSE SERPL-MCNC: 87 MG/DL (ref 65–140)

## 2022-02-22 PROCEDURE — 82948 REAGENT STRIP/BLOOD GLUCOSE: CPT

## 2022-02-22 PROCEDURE — 99232 SBSQ HOSP IP/OBS MODERATE 35: CPT | Performed by: INTERNAL MEDICINE

## 2022-02-22 RX ADMIN — Medication 20 MG: at 08:27

## 2022-02-22 RX ADMIN — FOLIC ACID 1 MG: 1 TABLET ORAL at 08:27

## 2022-02-22 RX ADMIN — DOXAZOSIN 2 MG: 2 TABLET ORAL at 08:27

## 2022-02-22 RX ADMIN — LISINOPRIL 5 MG: 5 TABLET ORAL at 08:27

## 2022-02-22 RX ADMIN — AMLODIPINE BESYLATE 5 MG: 5 TABLET ORAL at 08:27

## 2022-02-22 RX ADMIN — FINASTERIDE 5 MG: 5 TABLET, FILM COATED ORAL at 08:27

## 2022-02-22 NOTE — ASSESSMENT & PLAN NOTE
· NPO on only  · Meds and nutrition via PEG  · Anticipate DC home with tube feeding/VNA/home therapies in 24 hours  · Plan of care discussed with daughter

## 2022-02-22 NOTE — PROGRESS NOTES
[address]  Progress Note - Samina Coelho 1940, 78 y o  male MRN: [id number]  Unit/Bed#: E5 -01 Encounter: [id number]  Primary Care Provider: Waqar Go DO   Date and time admitted to hospital: 2/17/2022  7:03 PM    * 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  Assessment & Plan  · NPO on only  · Meds and nutrition via PEG  · Anticipate DC home with tube feeding/VNA/home therapies in 24 hours  · Plan of care discussed with daughter    Failure to thrive in adult  Assessment & Plan  · Multifactorial secondary to history of hemorrhagic stroke, advanced age, deconditioning  · Non ambulatory at baseline  · Continue tube feeding diet  · He will need medical transport on discharge  · Daughter is anticipating him coming home tomorrow  Hypertension  Assessment & Plan  · Continue amlodipine 5 mg daily, Cardura 2 mg daily, lisinopril 5 mg daily via PEG tube    Moderate protein-calorie malnutrition (HCC)  Assessment & Plan  Malnutrition Findings:   Adult Malnutrition type: Acute illness  Adult Degree of Malnutrition: Malnutrition of moderate degree (Malnutrition of moderate degree related GI concerns to as evidenced by PO intake meeting <75% of pt's needs >7 days and 7 5% wt loss in 3 months  Currently NPO, plans for tube feed )    BMI Findings: Body mass index is 21 95 kg/m²  Cerebral amyloid angiopathy (HCC)  Assessment & Plan  With history of cerebral hemorrhage, stable    Prostate cancer (HCC)  Assessment & Plan  Continue finasteride daily      VTE Pharmacologic Prophylaxis:   Pharmacologic:  None placed on admission due to history of cerebral hemorrhage  Mechanical VTE Prophylaxis in Place: Yes    Patient Centered Rounds: Discussed with his nurse    Discussions with Specialists or Other Care Team Provider:  Hospital course reviewed  Discussed with case management    Education and Discussions with Family / Patient:  Discussed with daughter    Time Spent for Care: 20 minutes  More than 50% of total time spent on counseling and coordination of care as described above  Current Length of Stay: 5 day(s)    Current Patient Status: Inpatient   Certification Statement: The patient will continue to require additional inpatient hospital stay due to Discharge planning    Discharge Plan:  DC home in 24 hours    Code Status: Level 3 - DNAR and DNI      Subjective:   Poor historian  Speak Colombian but Occasionally mumbles  Complained of muscle pain    Objective:     Vitals:   Temp (24hrs), Av 7 °F (37 1 °C), Min:98 3 °F (36 8 °C), Max:99 2 °F (37 3 °C)    Temp:  [98 3 °F (36 8 °C)-99 2 °F (37 3 °C)] 98 5 °F (36 9 °C)  HR:  [72-80] 77  BP: (102-146)/(54-69) 146/69  SpO2:  [95 %-96 %] 96 %  Body mass index is 21 95 kg/m²  Input and Output Summary (last 24 hours): Intake/Output Summary (Last 24 hours) at 2022 1225  Last data filed at 2022 0600  Gross per 24 hour   Intake 2932 ml   Output 485 ml   Net 2447 ml       Physical Exam:     Physical Exam  Constitutional:       Appearance: He is not ill-appearing or diaphoretic  HENT:      Head: Normocephalic and atraumatic  Nose: No rhinorrhea  Eyes:      General: No scleral icterus  Cardiovascular:      Rate and Rhythm: Regular rhythm  Pulmonary:      Comments: Poor effort  Abdominal:      Comments: PEG without sign of infection or discharge   Musculoskeletal:      Cervical back: Neck supple  Right lower leg: No edema  Left lower leg: No edema  Comments: Diminished muscle mass   Neurological:      Mental Status: He is alert        Comments: Oriented to person only   Psychiatric:         Behavior: Behavior normal        Additional Data:     Labs:    Results from last 7 days   Lab Units 22  0557   WBC Thousand/uL 11 24*   HEMOGLOBIN g/dL 10 6*   HEMATOCRIT % 32 6*   PLATELETS Thousands/uL 313   NEUTROS PCT % 76*   LYMPHS PCT % 14   MONOS PCT % 10   EOS PCT % 0     Results from last 7 days   Lab Units 02/18/22  0557 02/17/22 2003 02/17/22 2003   SODIUM mmol/L 136   < > 137   POTASSIUM mmol/L 4 0   < > 4 7   CHLORIDE mmol/L 100   < > 100   CO2 mmol/L 25   < > 27   BUN mg/dL 29*   < > 34*   CREATININE mg/dL 0 64   < > 0 78   ANION GAP mmol/L 11   < > 10   CALCIUM mg/dL 9 2   < > 9 1   ALBUMIN g/dL  --   --  2 9*   TOTAL BILIRUBIN mg/dL  --   --  0 49   ALK PHOS U/L  --   --  37*   ALT U/L  --   --  12   AST U/L  --   --  14   GLUCOSE RANDOM mg/dL 92   < > 116    < > = values in this interval not displayed  Results from last 7 days   Lab Units 02/22/22  1148 02/22/22  2421 02/22/22  0029 02/21/22  1816 02/21/22  1130 02/21/22  0622 02/21/22  0007 02/20/22  1801 02/20/22  1135 02/20/22  0632 02/20/22  0015 02/19/22  1751   POC GLUCOSE mg/dl 110 87 145* 189* 138 112 134 157* 129 181* 257* 185*                   * I Have Reviewed All Lab Data Listed Above  * Additional Pertinent Lab Tests Reviewed:  All Labs Within Last 24 Hours Reviewed    Imaging:    Imaging Reports Reviewed Today Include:  None      Recent Cultures (last 7 days):           Last 24 Hours Medication List:   Current Facility-Administered Medications   Medication Dose Route Frequency Provider Last Rate    acetaminophen  650 mg Per G Tube Q4H PRN Gurney Cables, CRNP      aluminum-magnesium hydroxide-simethicone  30 mL Per G Tube Q6H PRN Gurney Cables, CRNP      amLODIPine  5 mg Per G Tube Daily Gurney Cables, CRNP      bisacodyl  10 mg Rectal Daily PRN Gurney Cables, CRNP      doxazosin  2 mg Per G Tube Daily Gurney Cables, CRNP      finasteride  5 mg Oral Daily Gurney Cables, CRNP      folic acid  1 mg Per PEG Tube Daily Gurney Cables, CRNP      insulin lispro  1-5 Units Subcutaneous Q6H [facility] Gurney Cables, CRNP      lisinopril  5 mg Oral Daily Gurney Cables, CRNP      omeprazole (PRILOSEC) suspension 2 mg/mL  20 mg Per G Tube Daily YUSRA Rojo          Today, Patient Was Seen By: Rocío Ramirez MD    ** Please Note: Dictation voice to text software may have been used in the creation of this document   **

## 2022-02-22 NOTE — ASSESSMENT & PLAN NOTE
· Multifactorial secondary to history of hemorrhagic stroke, advanced age, deconditioning  · Non ambulatory at baseline  · Continue tube feeding diet  · He will need medical transport on discharge  · Daughter is anticipating him coming home tomorrow  Advancement-Rotation Flap Text: The defect edges were debeveled with a #15 scalpel blade.  Given the location of the defect, shape of the defect and the proximity to free margins an advancement-rotation flap was deemed most appropriate.  Using a sterile surgical marker, an appropriate flap was drawn incorporating the defect and placing the expected incisions within the relaxed skin tension lines where possible. The area thus outlined was incised deep to adipose tissue with a #15 scalpel blade.  The skin margins were undermined to an appropriate distance in all directions utilizing iris scissors.

## 2022-02-22 NOTE — ASSESSMENT & PLAN NOTE
Malnutrition Findings:   Adult Malnutrition type: Acute illness  Adult Degree of Malnutrition: Malnutrition of moderate degree (Malnutrition of moderate degree related GI concerns to as evidenced by PO intake meeting <75% of pt's needs >7 days and 7 5% wt loss in 3 months  Currently NPO, plans for tube feed )    BMI Findings: Body mass index is 21 95 kg/m²

## 2022-02-22 NOTE — TELEPHONE ENCOUNTER
LMOM for Fabian Dan to contact us back to reschedule the patients appt  Patient is currently inpatient and The Dr will not be in the office on 2/24/22  If the daughter calls back please schedule for first available with Daniel Figueroa

## 2022-02-23 LAB
GLUCOSE SERPL-MCNC: 103 MG/DL (ref 65–140)
GLUCOSE SERPL-MCNC: 107 MG/DL (ref 65–140)
GLUCOSE SERPL-MCNC: 116 MG/DL (ref 65–140)
GLUCOSE SERPL-MCNC: 123 MG/DL (ref 65–140)

## 2022-02-23 PROCEDURE — 82948 REAGENT STRIP/BLOOD GLUCOSE: CPT

## 2022-02-23 PROCEDURE — 99239 HOSP IP/OBS DSCHRG MGMT >30: CPT | Performed by: INTERNAL MEDICINE

## 2022-02-23 RX ORDER — PEN NEEDLE, DIABETIC 29 G X1/2"
NEEDLE, DISPOSABLE MISCELLANEOUS 4 TIMES DAILY
Qty: 100 EACH | Refills: 0 | Status: SHIPPED | OUTPATIENT
Start: 2022-02-23

## 2022-02-23 RX ORDER — LANCETS 23 GAUGE
EACH MISCELLANEOUS
Qty: 100 EACH | Refills: 0 | Status: SHIPPED | OUTPATIENT
Start: 2022-02-23

## 2022-02-23 RX ADMIN — FOLIC ACID 1 MG: 1 TABLET ORAL at 08:44

## 2022-02-23 RX ADMIN — AMLODIPINE BESYLATE 5 MG: 5 TABLET ORAL at 08:44

## 2022-02-23 RX ADMIN — Medication 20 MG: at 08:44

## 2022-02-23 RX ADMIN — LISINOPRIL 5 MG: 5 TABLET ORAL at 08:44

## 2022-02-23 RX ADMIN — DOXAZOSIN 2 MG: 2 TABLET ORAL at 08:44

## 2022-02-23 RX ADMIN — FINASTERIDE 5 MG: 5 TABLET, FILM COATED ORAL at 08:44

## 2022-02-23 NOTE — DISCHARGE INSTR - AVS FIRST PAGE
Mac العلي was started on tube feeding diet    His medications were also given through the PEG tube  His insulin requirements were noted to be much less  He was placed on and insulin schedule called sliding scale  Check blood sugars every 6 hours  Please follow the scale below depending on his blood sugars    Blood Glucose 150 - 224: 1 Unit of Insulin  Blood Glucose 225 - 299: 2 Units of Insulin  Blood Glucose 300 - 374: 3 Units of Insulin  Blood Glucose 375 - 449: 4 Units of Insulin  Blood Glucose greater than or equal to 450: 5 Units of Insulin

## 2022-02-23 NOTE — CASE MANAGEMENT
Case Management Discharge Planning Note    Patient name Poppy Mcdonough  Location [address] [place] [address] [id number] MRN [id number]  : 1940 Date 2022       Current Admission Date: 2022  Current Admission Diagnosis: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   Patient Active Problem List    Diagnosis Date Noted    Failure to thrive in adult 2022    History of cerebral hemorrhage 2022    Anemia 10/06/2021    SIRS (systemic inflammatory response syndrome) ([facility] ) 10/03/2021    Renal cyst, right 10/02/2021    Leukocytosis 10/02/2021    Moderate protein-calorie malnutrition ([facility] ) 10/01/2021    Elevated LFTs 2021    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 2021    Toxic metabolic encephalopathy     Cerebral amyloid angiopathy ([facility] ) 2021    Cerebral hemorrhage ([facility] ) 2021    Hypertension 2020    Lower urinary tract symptoms 2020    Prostate cancer ([facility] ) 2020      LOS (days): 6  Geometric Mean LOS (GMLOS) (days): 2 60  Days to GMLOS:-3 1     OBJECTIVE:  Risk of Unplanned Readmission Score: 17         Current admission status: Inpatient   Preferred Pharmacy:   [id number], [address] [facility]   [facility],[address] Floor [address]  Phone: [phone] Fax: [phone]    Primary Care Provider: Kajal Wang DO    Primary Insurance: [facility] Dunn [facility]  Secondary Insurance: [address] [place] [address] E    DISCHARGE DETAILS:    Discharge planning discussed with[de-identified] Barnet Age  Freedom of Choice: Yes     CM contacted family/caregiver?: Yes  Were Treatment Team discharge recommendations reviewed with patient/caregiver?: Yes  Did patient/caregiver verbalize understanding of patient care needs?: Yes  Were patient/caregiver advised of the risks associated with not following Treatment Team discharge recommendations?: Yes    Requested  [facility] [address]         Is the patient interested in [address] at discharge?: Yes  Home Health Discipline requested[de-identified] [facility] Agency Name[de-identified] [facility] Provider[de-identified] PCP  Home Health Services Needed[de-identified] Evaluate Functional Status and Safety,Gait/ADL Training,Strengthening/Theraputic Exercises to Improve Function  Homebound Criteria Met[de-identified] Uses an Assist Device (i e  cane, walker, etc)  Supporting Clincal Findings[de-identified] Cognitive Deficit Requiring the Assistance of Lyle Lewis in Short Distances,Bed Bound or Wheelchair Bound    DME Referral Provided  Referral made for DME?: Yes  DME referral completed for the following items[de-identified] Other  DME Supplier Name[de-identified] [employer]    Other Referral/Resources/Interventions Provided:  Interventions: Maria M Rooney    Treatment Team Recommendation: Home with [facility]  Discharge Destination Plan[de-identified] Home with Jeri at Discharge : [facility]  Dispatcher Contacted: Yes  Number/Name of Dispatcher: [facility]  Transported by [employer] and Unit #): [id number] [place]  ETA of Transport (Date): 02/23/22  ETA of Transport (Time): 2030     IMM Given (Date):: 02/23/22  IMM Given to[de-identified] Family Munir Appiah)

## 2022-02-23 NOTE — DISCHARGE SUMMARY
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  Assessment & Plan  · Evaluated by speech therapy who recommended pure NPO with medications and nutrition via PEG  · Continue Jevity 1 0 480 mL bolus every 6 hours with free water flush 100 mL with each bolus  DC home today with family support and VNA  Plan of care discussed with daughter she is expecting him to come home today    Failure to thrive in adult  Assessment & Plan  · Multifactorial secondary to history of hemorrhagic stroke, advanced age, deconditioning  · Non ambulatory at baseline  · Continue tube feeding diet  · He has history of diabetes but has less insulin requirements while in the hospital   · Will hold his basal insulin  · Will prescribe sliding scale insulin only since he is high risk for hypoglycemia    Hypertension  Assessment & Plan  · Continue amlodipine 5 mg daily, Cardura 2 mg daily, lisinopril 5 mg daily via PEG tube    Moderate protein-calorie malnutrition (HCC)  Assessment & Plan  Malnutrition Findings:   Adult Malnutrition type: Acute illness  Adult Degree of Malnutrition: Malnutrition of moderate degree (Malnutrition of moderate degree related GI concerns to as evidenced by PO intake meeting <75% of pt's needs >7 days and 7 5% wt loss in 3 months  Currently NPO, plans for tube feed )    BMI Findings: Body mass index is 21 95 kg/m²         Cerebral amyloid angiopathy ([facility] )  Assessment & Plan  With history of cerebral hemorrhage, stable    Prostate cancer [facility])  Assessment & Plan  Continue finasteride daily      Discharging Physician / Practitioner: Jessica Coon MD  PCP: Dwight Mendiola DO  Admission Date:   Admission Orders (From admission, onward)     Ordered        02/17/22 [address]  Once                      Discharge Date: 02/23/22    Medical Problems             Resolved Problems  Date Reviewed: 2/23/2022          Resolved    Hyperglycemia 2/22/2022     Resolved by  Gene Lai MD    Goals of care, counseling/discussion 2/22/2022     Resolved by  Gene Lai MD                Consultations During Hospital Stay:  · Speech therapy    Procedures Performed:   · None    Significant Findings / Test Results:   · Chest x-ray-mild bibasilar opacities likely due to atelectasis and small pleural effusion  · CT abdomen-no acute finding    Incidental Findings:   · Known left prostate capsular contour bulging in keeping with extracapsular extent of prostate carcinoma unchanged from comparison studies     Test Results Pending at Discharge (will require follow up): · None     Outpatient Tests Requested:  · None    Complications:  None    Reason for Admission:  Not eating    Hospital Course:     Griffin Diaz is a 78 y o  male patient who originally presented to the hospital on 2/17/2022 due to poor oral intake and failure to thrive  He has known history of hemorrhagic stroke with residual dysphagia and ambulatory dysfunction  He is bed-bound at baseline  He was seen by speech therapy who recommended NPO status  He was initiated on tube feedings via PEG present prior to admission  He tolerated bolus feedings without any issues  He was seen by PT and OT and his family decided to take him home with visiting nurses and home therapies  Patient will be transported home today  His daughter was updated regularly regarding his condition and plan  She will meet him at home when he arrives and she had no objections with discharge today       Please see above list of diagnoses and related plan for additional information  Condition at Discharge: good     Discharge Day Visit / Exam:     Subjective: The patient was able to speak some [address]    He stated that he was "feeling good" and he also mentioned "will see what happens"  No events overnight per nurse  Vitals: Blood Pressure: 124/66 (02/23/22 0651)  Pulse: 74 (02/23/22 0651)  Temperature: 99 5 °F (37 5 °C) (02/23/22 0651)  Temp Source: Oral (02/18/22 0849)  Respirations: 18 (02/23/22 0651)  Height: 5' 3" (160 cm) (02/18/22 0900)  Weight - Scale: 56 2 kg (123 lb 14 4 oz) (02/18/22 0900)  SpO2: 98 % (02/23/22 0651)  Exam:   Physical Exam  Vitals reviewed  Constitutional:       Appearance: He is not ill-appearing or diaphoretic  HENT:      Head: Normocephalic and atraumatic  Eyes:      General: No scleral icterus  Cardiovascular:      Rate and Rhythm: Regular rhythm  Heart sounds: No murmur heard  No gallop  Pulmonary:      Effort: Pulmonary effort is normal  No respiratory distress  Breath sounds: No wheezing  Abdominal:      General: Abdomen is flat  There is no distension  Palpations: Abdomen is soft  Tenderness: There is no abdominal tenderness  Musculoskeletal:      Cervical back: Neck supple  Right lower leg: No edema  Left lower leg: No edema  Skin:     General: Skin is warm and dry  Neurological:      Comments: Oriented to person   Psychiatric:         Behavior: Behavior normal      Discussion with Family:  Spoke with hrelinda Chowdary today and gave update    Discharge instructions/Information to patient and family:   See after visit summary for information provided to patient and family  Provisions for Follow-Up Care:  See after visit summary for information related to follow-up care and any pertinent home health orders  Disposition:     Home with VNA Services (Reminder: Complete face to face encounter)    Planned Readmission: no     Discharge Statement:  I spent >30 minutes discharging the patient  This time was spent on the day of discharge  I had direct contact with the patient on the day of discharge   Greater than 50% of the total time was spent examining patient, answering all patient questions, arranging and discussing plan of care with patient as well as directly providing post-discharge instructions  Additional time then spent on discharge activities  Discharge Medications:  See after visit summary for reconciled discharge medications provided to patient and family        ** Please Note: This note has been constructed using a voice recognition system **

## 2022-02-23 NOTE — ASSESSMENT & PLAN NOTE
· Evaluated by speech therapy who recommended pure NPO with medications and nutrition via PEG  · Continue Jevity 1 0 480 mL bolus every 6 hours with free water flush 100 mL with each bolus  DC home today with family support and VNA  Plan of care discussed with daughter she is expecting him to come home today

## 2022-02-23 NOTE — ASSESSMENT & PLAN NOTE
· Multifactorial secondary to history of hemorrhagic stroke, advanced age, deconditioning  · Non ambulatory at baseline  · Continue tube feeding diet

## 2022-02-24 ENCOUNTER — TRANSITIONAL CARE MANAGEMENT (OUTPATIENT)
Dept: FAMILY MEDICINE CLINIC | Facility: CLINIC | Age: 82
End: 2022-02-24

## 2022-02-24 VITALS
SYSTOLIC BLOOD PRESSURE: 116 MMHG | DIASTOLIC BLOOD PRESSURE: 72 MMHG | RESPIRATION RATE: 18 BRPM | OXYGEN SATURATION: 98 % | HEIGHT: 63 IN | BODY MASS INDEX: 21.95 KG/M2 | WEIGHT: 123.9 LBS | TEMPERATURE: 98.2 F | HEART RATE: 62 BPM

## 2022-02-24 RX ORDER — INSULIN ASPART 100 [IU]/ML
INJECTION, SOLUTION INTRAVENOUS; SUBCUTANEOUS
Qty: 15 ML | Refills: 0 | Status: SHIPPED | OUTPATIENT
Start: 2022-02-24

## 2022-02-24 NOTE — NURSING NOTE
Pt d/c'd to home at 2045 w/ EMS via stretcher and all appropriate paperwork  No belongings noted  IV removed previous to this shift  PEG tube patent and flushing well

## 2022-02-25 ENCOUNTER — TELEPHONE (OUTPATIENT)
Dept: FAMILY MEDICINE CLINIC | Facility: CLINIC | Age: 82
End: 2022-02-25

## 2022-02-25 ENCOUNTER — TELEPHONE (OUTPATIENT)
Dept: OTHER | Facility: HOSPITAL | Age: 82
End: 2022-02-25

## 2022-02-25 NOTE — TELEPHONE ENCOUNTER
I spoke with patient's daughter Jameson Flores this morning and she states she and her family talked about a Radiation Oncology Consult and they are deferring at this time  She has my number in the event she feels her father will be up to making the daily trips in for treatment

## 2022-02-25 NOTE — TELEPHONE ENCOUNTER
Negro Blackwell called from [facility]'s vna she is a home physical therapist  Physical therapy went out evaluated  pt today they will be working with him shirley the next 4 weeks  FYI: no need for follow up

## 2022-03-01 ENCOUNTER — TELEPHONE (OUTPATIENT)
Dept: FAMILY MEDICINE CLINIC | Facility: CLINIC | Age: 82
End: 2022-03-01

## 2022-03-01 NOTE — TELEPHONE ENCOUNTER
Josselin gonzalez/ Maryann LIU is requesting a verbal order for 2 times a week for 3 weeks for Physical Therapy  Also, would like an order for speech therapy to evaluate and access his cognition and capability of swallowing    Please contact Josselin gonzalez/ Adonis LIU at [phone] with the verbal order

## 2022-03-03 ENCOUNTER — TELEPHONE (OUTPATIENT)
Dept: FAMILY MEDICINE CLINIC | Facility: CLINIC | Age: 82
End: 2022-03-03

## 2022-03-03 DIAGNOSIS — Z74.09 POOR MOBILITY: Primary | ICD-10-CM

## 2022-03-03 DIAGNOSIS — E53.8 LOW FOLATE: ICD-10-CM

## 2022-03-03 DIAGNOSIS — I10 PRIMARY HYPERTENSION: ICD-10-CM

## 2022-03-03 RX ORDER — AMLODIPINE BESYLATE 5 MG/1
5 TABLET ORAL DAILY
Qty: 30 TABLET | Refills: 5
Start: 2022-03-03 | End: 2022-03-10 | Stop reason: SDUPTHER

## 2022-03-03 RX ORDER — FOLIC ACID 1 MG/1
1 TABLET ORAL DAILY
Qty: 30 TABLET | Refills: 0 | Status: SHIPPED | OUTPATIENT
Start: 2022-03-03 | End: 2022-03-28 | Stop reason: SDUPTHER

## 2022-03-03 NOTE — TELEPHONE ENCOUNTER
I called [facility] she is aware of your message and recommendations  She is aware pt is to take amlodipine as prescribed you will refill  Verbal ok for  per your advisement  [facility]  What dose should pt take for otc melatonin?          Gel cushion [facility] to [phone]

## 2022-03-03 NOTE — TELEPHONE ENCOUNTER
Pt was recently in hospital  with failure to thrive  Pt is now now  2 feedings  They changed meds in hospital  Pt is on lisinopril and amlodipine for blood pressure  Pt does not have a script for amlodipine  Pt's bp has been between 115-120 /60-68   Would you like to renew amlodipine 5 mg ? Can something be prescribed so pt can sleep at night  [facility] on [address]  Also they are requesting a gel cushion  for p;'s chair please send that to [facility]  Lastly Can they also order a  to come to evaluate to see if any other services could be provided in the home  They would like a verbal order so please all Pedro Charles at [phone]    Also please call pt's family member

## 2022-03-03 NOTE — TELEPHONE ENCOUNTER
Bobby Zelaya called from [facility]' vna requesting a call back form our office on [facility] regarding medications

## 2022-03-10 ENCOUNTER — TELEPHONE (OUTPATIENT)
Dept: FAMILY MEDICINE CLINIC | Facility: CLINIC | Age: 82
End: 2022-03-10

## 2022-03-10 DIAGNOSIS — R11.0 NAUSEA: Primary | ICD-10-CM

## 2022-03-10 DIAGNOSIS — I10 PRIMARY HYPERTENSION: ICD-10-CM

## 2022-03-10 RX ORDER — AMLODIPINE BESYLATE 5 MG/1
5 TABLET ORAL DAILY
Qty: 30 TABLET | Refills: 5 | Status: CANCELLED | OUTPATIENT
Start: 2022-03-10

## 2022-03-10 RX ORDER — ONDANSETRON 4 MG/1
4 TABLET, ORALLY DISINTEGRATING ORAL DAILY PRN
Qty: 20 TABLET | Refills: 0 | Status: SHIPPED | OUTPATIENT
Start: 2022-03-10

## 2022-03-10 RX ORDER — AMLODIPINE BESYLATE 5 MG/1
5 TABLET ORAL DAILY
Qty: 30 TABLET | Refills: 5
Start: 2022-03-10 | End: 2022-03-17 | Stop reason: SDUPTHER

## 2022-03-10 NOTE — TELEPHONE ENCOUNTER
Deliliah Rabon called from [facility]'s vna  Pt is on 2 feedings complaining  of nausea  No vomiting  Are you ablle to send in something for nausea to [facility] on [address]? Please re send the amlodipine 5 mg needs to be re sent  To [facility] on [address]  It was never received  I called [facility] pt's script for amlodipine from 03/03/22 was never received will candie up

## 2022-03-10 NOTE — TELEPHONE ENCOUNTER
Dane Kirkland is a speech therapist  with [facility]'s vna did a speech eval in pt's home yesterday 03/09/22  Are you able to sign the vna order once they come in? I informed once they are signed they  would be placed out for you  Pt is nothing by mouth , but did good yesterday, They need order for a repeat video barium swallow in epic  Can you please order?     Please call [phone]

## 2022-03-10 NOTE — TELEPHONE ENCOUNTER
lmom for Dipti Mckenzie informing her prescriptions were sent in  Any questions please call the office

## 2022-03-10 NOTE — TELEPHONE ENCOUNTER
Please re send the amlodipine 5 mg needs to be re sent  To [facility] on [address]  It was never received

## 2022-03-11 NOTE — TELEPHONE ENCOUNTER
Gabriela Cruz from [facility] called today  She is asking for an order of a repeat VBS with Speech  Can you place order? Please advise  Phone number is : [phone]  We can also call the daughter at [phone] Hazel Paul

## 2022-03-11 NOTE — TELEPHONE ENCOUNTER
I did call and LMOM for Luzmaria Espitia in regards to the order  We need to verify and we also need a diagnosis code

## 2022-03-14 DIAGNOSIS — R13.10 DYSPHAGIA, UNSPECIFIED TYPE: Primary | ICD-10-CM

## 2022-03-14 DIAGNOSIS — I10 PRIMARY HYPERTENSION: ICD-10-CM

## 2022-03-14 DIAGNOSIS — I69.391 DYSPHAGIA STATUS POST CEREBROVASCULAR ACCIDENT: ICD-10-CM

## 2022-03-14 RX ORDER — AMLODIPINE BESYLATE 5 MG/1
5 TABLET ORAL DAILY
Qty: 30 TABLET | Refills: 5
Start: 2022-03-14

## 2022-03-14 NOTE — TELEPHONE ENCOUNTER
The diagnosis code is for [id number] Dysphagia following cerebral infarction and then  for Dysphagia   Yes for the video barium swallow order

## 2022-03-17 DIAGNOSIS — I10 PRIMARY HYPERTENSION: ICD-10-CM

## 2022-03-17 RX ORDER — AMLODIPINE BESYLATE 5 MG/1
TABLET ORAL
Qty: 90 TABLET | Refills: 3 | Status: SHIPPED | OUTPATIENT
Start: 2022-03-17

## 2022-03-17 RX ORDER — AMLODIPINE BESYLATE 5 MG/1
5 TABLET ORAL DAILY
Qty: 30 TABLET | Refills: 5
Start: 2022-03-17 | End: 2022-03-17 | Stop reason: SDUPTHER

## 2022-03-17 RX ORDER — AMLODIPINE BESYLATE 5 MG/1
5 TABLET ORAL DAILY
Qty: 30 TABLET | Refills: 5 | Status: SHIPPED | OUTPATIENT
Start: 2022-03-17 | End: 2022-03-17

## 2022-03-17 NOTE — TELEPHONE ENCOUNTER
I called [facility] [address]  I spoke with Genevieve Leach at [facility]  Pt's script appears to be sent ,but e prescribing was not  actually sent  It was never sent

## 2022-03-17 NOTE — TELEPHONE ENCOUNTER
Per conversation with pt's daughter Yanni Graham pt does take amlodipine via G tube  I will make sure it is called in  Looking to hear about the swallow test per speech therapy  For when it is scheduled  I am calling [facility] to make sure pt's script has been received  Any issues I informed  Yanni Santos I would call her

## 2022-03-17 NOTE — TELEPHONE ENCOUNTER
Sagar Mcneil called from [facility]'s a regarding Amlodipine pt's pharmacy has yet to receive the script  Pt's amlodipine 5 mg needs sent to [facility] on [address]  Unless pt is not  to take it? Please advise  Pt's script has yet to be received   Pt  was last prescribed 03/10/22   Please call Sagar Mcneil [phone]

## 2022-03-17 NOTE — TELEPHONE ENCOUNTER
Jose Antonio Baltazar called from [facility] home physical therapy regarding Aura Ventura to let you know that Krisjohn Noor will be seen only once this week instead of his normal twice this week

## 2022-03-17 NOTE — TELEPHONE ENCOUNTER
I called [facility] and spoke to [place]  It is in process  Should be ready by 5 pm      I called [facility] she is aware rx was sent in

## 2022-03-18 ENCOUNTER — TELEPHONE (OUTPATIENT)
Dept: FAMILY MEDICINE CLINIC | Facility: CLINIC | Age: 82
End: 2022-03-18

## 2022-03-18 DIAGNOSIS — M21.372 LEFT FOOT DROP: Primary | ICD-10-CM

## 2022-03-18 NOTE — TELEPHONE ENCOUNTER
Order was faxed with demographic page with cover sheet to [facility] [phone]

## 2022-03-18 NOTE — TELEPHONE ENCOUNTER
I called [facility]iel yes it is an ankle foot orthosis support  Dx code per Lucina Fall would be pt does have left foot drop which is    [id number]  please advsie

## 2022-03-18 NOTE — TELEPHONE ENCOUNTER
Shaista Winters called form [facility] vna she is a home physical therapist  They are asking  are you please bale to order a left afo  Please fax to [facility] to [phone]  Please fa order along with pt demographic page per [facility] request    No call back is necessary

## 2022-03-18 NOTE — TELEPHONE ENCOUNTER
Sabrina called the office from [facility] requesting osiel's number to discuss the order for afo she was given her number

## 2022-03-21 ENCOUNTER — TELEPHONE (OUTPATIENT)
Dept: FAMILY MEDICINE CLINIC | Facility: CLINIC | Age: 82
End: 2022-03-21

## 2022-03-21 NOTE — TELEPHONE ENCOUNTER
Gio Land called she is an at home physical therapist for  Jeff galvez  She is looking for verbal orders for [facility] to have physical therapy twice a week for 3 more weeks  Per verbal conversation with Sofia Early yes pt may have physical therapy twice a week for 3 more weeks call complete

## 2022-03-23 ENCOUNTER — TELEPHONE (OUTPATIENT)
Dept: FAMILY MEDICINE CLINIC | Facility: CLINIC | Age: 82
End: 2022-03-23

## 2022-03-23 NOTE — TELEPHONE ENCOUNTER
Sherrell Groves, OT with [facility] called  BP today laying in bed was 100/50 right arm, 90/85 after activity  This is much lower than past visits  He has had dizziness, lightheadedness  BP is much lower since restarting the Amlodipine    Should they discontinue the Amlodipine or just give to him as needed based upon BP readings (higher than what reading?)

## 2022-03-23 NOTE — TELEPHONE ENCOUNTER
Patient's daughter is aware to hold Amlodipine if BP is lower than 727 systolic, she checks his BP every day before giving him medication

## 2022-03-24 ENCOUNTER — TELEPHONE (OUTPATIENT)
Dept: FAMILY MEDICINE CLINIC | Facility: CLINIC | Age: 82
End: 2022-03-24

## 2022-03-24 ENCOUNTER — HOSPITAL ENCOUNTER (OUTPATIENT)
Dept: RADIOLOGY | Facility: HOSPITAL | Age: 82
Discharge: HOME | End: 2022-03-24
Payer: COMMERCIAL

## 2022-03-24 DIAGNOSIS — I69.391 DYSPHAGIA STATUS POST CEREBROVASCULAR ACCIDENT: ICD-10-CM

## 2022-03-24 DIAGNOSIS — R13.10 DYSPHAGIA, UNSPECIFIED TYPE: ICD-10-CM

## 2022-03-24 PROCEDURE — 92611 MOTION FLUOROSCOPY/SWALLOW: CPT

## 2022-03-24 PROCEDURE — 74230 X-RAY XM SWLNG FUNCJ C+: CPT

## 2022-03-24 NOTE — TELEPHONE ENCOUNTER
Pt did not meet therapy frequency of twice this week  Pt cancelled a session this past mnoday so he was only seen once this week   FYI

## 2022-03-24 NOTE — PROCEDURES
Video Swallow Study      Patient Name: Carmencita Silvestre  GOABQ'B Date: 3/24/2022        Past Medical History  Past Medical History:   Diagnosis Date    Hypertension     Prostate cancer ([facility] )     no surgery required   Problem List from 2/21/22  Principal Problem:    Failure to thrive in adult  Active Problems:    Prostate cancer ([facility] )    Hypertension    Cerebral hemorrhage ([facility] )    Cerebral amyloid angiopathy ([facility] )    Hyperglycemia    Goals of care, counseling/discussion    Dysphagia    Moderate protein-calorie malnutrition ([facility] )    History of cerebral hemorrhage     Past Surgical History  Past Surgical History:   Procedure Laterality Date    GASTROSTOMY TUBE PLACEMENT N/A 9/30/2021    Procedure: INSERTION PEG TUBE;  Surgeon: Rozina Silva DO;  Location: [facility];  Service: General    HERNIA REPAIR         Video Barium Swallow Study    Summary:  Images are on PACS for review  Oral stage was WNL/WFL  Mastication was prolonged due to lack of dentition, but functional for items presented  Bolus formation, control, and transfer were WNL  Transfer was intentionally piecemeal at times w/ solids  Unable to transfer the pill orally w/ puree or thin liquid, though the barium pill is not coated  Pharyngeal stage was WNL  Swallows were prompt  Pharyngeal constriction, epiglottic inversion, and hyoid excursion were WNL  No pharyngeal retention, No penetration or aspiration  Per gross esophageal screen: minimal retention/retropulsion      Recommendations:  Diet: softer selections from regular diet  Avoid foods that are hard to chew  Liquids: thin  Meds: can trial coated pills or capsules as tolerated, vs crush or PEG  Strategies: slow rate  Sips after every few bites as needed  Upright position  F/u ST tx: ?  Brief f/u for tolerance  Therapy Prognosis: favorable  Prognosis considerations: pt did very well on study today  Close Supervision  Aspiration Precautions  Reflux Precautions  Consider consult with: dietician re adjusting TF as pt eats   Results reviewed with: pt, family  If a dedicated assessment of the esophagus is desired, consider esophagram/barium swallow or EGD  Pt is an 81yom referred by VNA SLP and physician for VBS  Unable to locate any record of a previous VBS, but a PEG was placed 9/20/21 for severe oral/pharyngeal dysphagia while at [facility]  Pt was seen x 2 at [facility] in February  He was refusing PO and/or requiring suction  H&P/pertinent provider notes: (PMH noted above)  PEG placed 9/20/21 for severe oral/pharyngeal dysphagia    Special Studies:  IMPRESSION:  CT head 10/14/21  Expected evolutionary change of the right frontal parietal parenchymal hemorrhage becoming less dense  Persistent surrounding vasogenic edema and localized mass effect  Trace subarachnoid hemorrhage within the left hemisphere is improving  Trace intraventricular hemorrhage within the occipital horns of the lateral ventricles, unchanged  Stable, confluent periventricular white matter hypoattenuation suggestive of chronic microangiopathic change  Ct abd/pelvis: 2/17/22 (pt was IP SLA at that time)  INDICATION:   Abdominal pain, acute, nonlocalized  Stomach pain, no recent BM  "recently d/c'd from nursing home after a stroke in september  Refusing to eat or drink prior to d/c, family sent in for concerns for dehydration  Peg tube in place, not being used "  IMPRESSION:  No acute findings  Known left prostate capsular contour bulging in keeping with with extracapsular extent of prostate carcinoma unchanged from the comparison studies  Previous VBS:  None known  Food Allergies:  none  Current Diet:  Npo/bolus feeds/peg  Premorbid diet:  Seen by  9/2021 in [facility], peg was placed  Last seen here 2/21/22  NPO w/  PEG for primary  Pt was requiring suction w/ PO   Dentition:  Has 2 teeth  Dentures were at home  reportedly loose     O2 requirement:  none  Oral mech:  Strength and ROM:  Grossly wfl  Vocal Quality/Speech:  Minimal speech was clear, low volume  Cognitive status:  Alert and pleasant  Consistencies administered: Puree, nutrigrain bar, granola bar, ham sandwich, thin liquid  Barium tablet in puree and thin but unable to transfer (had him spit it out)  Liquids were administered/taken by straw/cup/tsp     Pt was seated laterally at 90 degrees

## 2022-03-28 ENCOUNTER — TELEPHONE (OUTPATIENT)
Dept: FAMILY MEDICINE CLINIC | Facility: CLINIC | Age: 82
End: 2022-03-28

## 2022-03-28 DIAGNOSIS — R39.9 LOWER URINARY TRACT SYMPTOMS: ICD-10-CM

## 2022-03-28 DIAGNOSIS — E53.8 LOW FOLATE: ICD-10-CM

## 2022-03-28 DIAGNOSIS — K21.9 GASTROESOPHAGEAL REFLUX DISEASE, UNSPECIFIED WHETHER ESOPHAGITIS PRESENT: ICD-10-CM

## 2022-03-28 DIAGNOSIS — R13.10 DYSPHAGIA, UNSPECIFIED TYPE: Primary | ICD-10-CM

## 2022-03-28 DIAGNOSIS — C61 PROSTATE CANCER (HCC): ICD-10-CM

## 2022-03-28 DIAGNOSIS — I10 PRIMARY HYPERTENSION: Primary | ICD-10-CM

## 2022-03-28 RX ORDER — LISINOPRIL 5 MG/1
5 TABLET ORAL DAILY
Qty: 90 TABLET | Refills: 1 | Status: SHIPPED | OUTPATIENT
Start: 2022-03-28 | End: 2022-06-24

## 2022-03-28 RX ORDER — FINASTERIDE 5 MG/1
5 TABLET, FILM COATED ORAL DAILY
Qty: 90 TABLET | Refills: 1 | Status: SHIPPED | OUTPATIENT
Start: 2022-03-28 | End: 2022-06-30

## 2022-03-28 RX ORDER — FOLIC ACID 1 MG/1
1 TABLET ORAL DAILY
Qty: 90 TABLET | Refills: 1 | Status: SHIPPED | OUTPATIENT
Start: 2022-03-28

## 2022-03-28 RX ORDER — DOXAZOSIN 2 MG/1
2 TABLET ORAL DAILY
Qty: 90 TABLET | Refills: 1 | Status: SHIPPED | OUTPATIENT
Start: 2022-03-28 | End: 2022-06-30

## 2022-03-28 NOTE — TELEPHONE ENCOUNTER
[facility] and confirmed their fax is [phone]

## 2022-03-28 NOTE — TELEPHONE ENCOUNTER
Pt's daughter is requesting refills on thee four medications all 90 day supplies if possible sent to [facility] at [address] [facility]  Pt has virtual tcm rescheduled on 03/30/22 with Lisandro Guardado

## 2022-03-28 NOTE — TELEPHONE ENCOUNTER
Pt's script for omeprazole 2 mg/ML in sodium bicarbonate was faxed to [facility] to fax number [phone]

## 2022-03-28 NOTE — TELEPHONE ENCOUNTER
I called Jyoti Samuel I informed her pt's 4 scripts were sent in and script for omeprazole  was faxed

## 2022-03-28 NOTE — TELEPHONE ENCOUNTER
Manohar Monsalve pt is rescheduled to a virtual TCM this wednesday 03/30/22  Pt's daughter is asking are you please able to advise  Would you want pt to continue   liquid form of omeprazole 2 mg/ mL pt takes every morning  Unsure if it was short term  Pt's daughter unsure if he was just taking while in hospital or if given while ir rehab can not confirm/    they will need a refill ,but not sure if pt is to continue   Pt has virtual tcm this Wednesday 03/30/22   Pt uses [facility]   Please call Patsy Garcia at [phone]

## 2022-03-28 NOTE — TELEPHONE ENCOUNTER
Per verbal conversation with Mary Miner pt was taking liquid form of omeprazole  Pt was taking through g tube it was 10 mg liquid form of omeprazole  Pt was taking it once daily in the morning  Ok Continue until further notice  Pt recently did swallow test and everything went ok per Mary Miner  Please send to [facility]

## 2022-03-29 ENCOUNTER — TELEPHONE (OUTPATIENT)
Dept: FAMILY MEDICINE CLINIC | Facility: CLINIC | Age: 82
End: 2022-03-29

## 2022-03-29 NOTE — TELEPHONE ENCOUNTER
Patient's First-Omeprazole 2 mg/ml suspension is not on his pharmacy formulary  I spoke to [address], there are no formulary alternatives  I was advised to begin a prior authorization for this medication  Prior authorization was started via phone call  Ref #:  [id number]  Determination will be made within 72 hours and faxed to our office  Phone number to check the status of the authorization is [phone]  Patient's daughter is aware

## 2022-03-31 ENCOUNTER — RA CDI HCC (OUTPATIENT)
Dept: OTHER | Facility: HOSPITAL | Age: 82
End: 2022-03-31

## 2022-03-31 NOTE — PROGRESS NOTES
[facility]  coding opportunities       Chart reviewed, no opportunity found:   [address]        Patients Insurance     Medicare Insurance: [facility]

## 2022-04-04 NOTE — TELEPHONE ENCOUNTER
Unfortunately prior auth was approved for Omeprazole DR 20 mg   I spoke to [facility] [place], they are re-opening the case and will return my call later today with a response

## 2022-04-06 NOTE — TELEPHONE ENCOUNTER
Patient's First-Omeprazole 2 mg/ml suspension is non-formulary, there are no formulary alternatives available  A prior authorization is unable to be done as they do not cover this medication  I left the patient's daughter a detailed message to make her aware to discuss this with you further at her dad's scheduled appointment tomorrow  Her FMLA forms have been faxed, a copy has been placed in the chart, and the original is in the  drawer for patient's daughter to

## 2022-04-07 ENCOUNTER — OFFICE VISIT (OUTPATIENT)
Dept: FAMILY MEDICINE CLINIC | Facility: CLINIC | Age: 82
End: 2022-04-07
Payer: COMMERCIAL

## 2022-04-07 DIAGNOSIS — K21.9 GASTROESOPHAGEAL REFLUX DISEASE, UNSPECIFIED WHETHER ESOPHAGITIS PRESENT: ICD-10-CM

## 2022-04-07 DIAGNOSIS — I10 PRIMARY HYPERTENSION: ICD-10-CM

## 2022-04-07 DIAGNOSIS — G81.92 HEMIPLEGIA AFFECTING LEFT DOMINANT SIDE, UNSPECIFIED ETIOLOGY, UNSPECIFIED HEMIPLEGIA TYPE (HCC): ICD-10-CM

## 2022-04-07 DIAGNOSIS — I69.391 DYSPHAGIA DUE TO OLD STROKE: ICD-10-CM

## 2022-04-07 DIAGNOSIS — R62.7 FAILURE TO THRIVE IN ADULT: ICD-10-CM

## 2022-04-07 DIAGNOSIS — E44.0 MODERATE PROTEIN-CALORIE MALNUTRITION (HCC): ICD-10-CM

## 2022-04-07 DIAGNOSIS — E11.9 TYPE 2 DIABETES MELLITUS WITHOUT COMPLICATION, WITH LONG-TERM CURRENT USE OF INSULIN (HCC): ICD-10-CM

## 2022-04-07 DIAGNOSIS — Z76.89 ENCOUNTER FOR SUPPORT AND COORDINATION OF TRANSITION OF CARE: Primary | ICD-10-CM

## 2022-04-07 DIAGNOSIS — Z79.4 TYPE 2 DIABETES MELLITUS WITHOUT COMPLICATION, WITH LONG-TERM CURRENT USE OF INSULIN (HCC): ICD-10-CM

## 2022-04-07 PROCEDURE — 99214 OFFICE O/P EST MOD 30 MIN: CPT | Performed by: FAMILY MEDICINE

## 2022-04-07 RX ORDER — OMEPRAZOLE 10 MG/1
10 CAPSULE, DELAYED RELEASE ORAL DAILY
Qty: 30 CAPSULE | Refills: 3 | Status: SHIPPED | OUTPATIENT
Start: 2022-04-07 | End: 2022-04-08

## 2022-04-07 NOTE — PATIENT INSTRUCTIONS
Virtual TCM D/C 2/23 [phone]  Doing well and omeprazole suspension not covered  Doing better everyday  Getting med through feeding tube  Needs referral to dietician and GI  Has not had omeprazole in 6 days  Had some small vomit  Nurse seen today and possibly from not getting omeprazole as directed  Patient does not like tube feedings  Medical records reviewed for PURNIMA ARMSTRONG on last admission:    * 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  Assessment & Plan  · Evaluated by speech therapy who recommended pure NPO with medications and nutrition via PEG  · Continue Jevity 1 0 480 mL bolus every 6 hours with free water flush 100 mL with each bolus  DC home today with family support and VNA  Plan of care discussed with daughter she is expecting him to come home today     Failure to thrive in adult  Assessment & Plan  · Multifactorial secondary to history of hemorrhagic stroke, advanced age, deconditioning  · Non ambulatory at baseline  · Continue tube feeding diet  · He has history of diabetes but has less insulin requirements while in the hospital   · Will hold his basal insulin  · Will prescribe sliding scale insulin only since he is high risk for hypoglycemia     Hypertension  Assessment & Plan  · Continue amlodipine 5 mg daily, Cardura 2 mg daily, lisinopril 5 mg daily via PEG tube     Moderate protein-calorie malnutrition (HCC)  Assessment & Plan  Malnutrition Findings:   Adult Malnutrition type: Acute illness  Adult Degree of Malnutrition: Malnutrition of moderate degree (Malnutrition of moderate degree related GI concerns to as evidenced by PO intake meeting <75% of pt's needs >7 days and 7 5% wt loss in 3 months  Currently NPO, plans for tube feed )     BMI Findings:         Body mass index is 21 95 kg/m²          Cerebral amyloid angiopathy (HCC)  Assessment & Plan  With history of cerebral hemorrhage, stable     Prostate cancer [facility])  Assessment & Plan  Continue finasteride daily

## 2022-04-07 NOTE — PROGRESS NOTES
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  Assessment & Plan  · Evaluated by speech therapy who recommended pure NPO with medications and nutrition via PEG  · Continue Jevity 1 0 480 mL bolus every 6 hours with free water flush 100 mL with each bolus  DC home today with family support and VNA  Plan of care discussed with daughter she is expecting him to come home today     Failure to thrive in adult  Assessment & Plan  · Multifactorial secondary to history of hemorrhagic stroke, advanced age, deconditioning  · Non ambulatory at baseline  · Continue tube feeding diet  · He has history of diabetes but has less insulin requirements while in the hospital   · Will hold his basal insulin  · Will prescribe sliding scale insulin only since he is high risk for hypoglycemia     Hypertension  Assessment & Plan  · Continue amlodipine 5 mg daily, Cardura 2 mg daily, lisinopril 5 mg daily via PEG tube     Moderate protein-calorie malnutrition (HCC)  Assessment & Plan  Malnutrition Findings:   Adult Malnutrition type: Acute illness  Adult Degree of Malnutrition: Malnutrition of moderate degree (Malnutrition of moderate degree related GI concerns to as evidenced by PO intake meeting <75% of pt's needs >7 days and 7 5% wt loss in 3 months  Currently NPO, plans for tube feed )     BMI Findings:         Body mass index is 21 95 kg/m²          Cerebral amyloid angiopathy (HCC)  Assessment & Plan  With history of cerebral hemorrhage, stable     Prostate cancer [facility])  Assessment & Plan  Continue finasteride daily        No problem-specific Assessment & Plan notes found for this encounter  Diagnoses and all orders for this visit:    Encounter for support and coordination of transition of care    Gastroesophageal reflux disease, unspecified whether esophagitis present  -     Ambulatory Referral to Gastroenterology; Future  -     Ambulatory Referral to Nutrition Services; Future  -     omeprazole (PriLOSEC) 10 mg delayed release capsule; Take 1 capsule (10 mg total) by mouth daily    Primary hypertension    Moderate protein-calorie malnutrition (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)          Subjective:      Patient ID: Antonella Hnut is a 78 y o  male  [facility] and doing well, had swallowing study and every day he is doing well, he gets meds through feeding tube and omeprazole not able to be covered by insurance  Patient would like Nutrition counseling as well as GI specialist for gerd and reflux  BP and BS have been stable as per daughter and gets all meds as directed  Based on recent video swallowing study:    Recommendations:  Diet: softer selections from regular diet  Avoid foods that are hard to chew  Liquids: thin  Meds: can trial coated pills or capsules as tolerated, vs crush or PEG  Strategies: slow rate  Sips after every few bites as needed  Upright position  F/u ST tx: ?  Brief f/u for tolerance  Therapy Prognosis: favorable  Prognosis considerations: pt did very well on study today  Close Supervision  Aspiration Precautions  Reflux Precautions  Consider consult with: dietician re adjusting TF as pt eats   Results reviewed with: pt, family  If a dedicated assessment of the esophagus is desired, consider esophagram/barium swallow or EGD        Hospital Course:      Virgil Sol is a 78 y o  male patient who originally presented to the hospital on 2/17/2022 due to poor oral intake and failure to thrive  He has known history of hemorrhagic stroke with residual dysphagia and ambulatory dysfunction  He is bed-bound at baseline  He was seen by speech therapy who recommended NPO status  He was initiated on tube feedings via PEG present prior to admission  He tolerated bolus feedings without any issues  He was seen by PT and OT and his family decided to take him home with visiting nurses and home therapies  Patient will be transported home today  His daughter was updated regularly regarding his condition and plan  She will meet him at home when he arrives and she had no objections with discharge today         The following portions of the patient's history were reviewed and updated as appropriate: allergies, current medications, past family history, past medical history, past social history, past surgical history and problem list     Review of Systems   Constitutional: Negative  HENT: Negative  Eyes: Negative  Respiratory: Negative  Cardiovascular: Negative  Gastrointestinal: Negative  Endocrine: Negative  Genitourinary: Negative  Musculoskeletal: Negative  Skin: Negative  Allergic/Immunologic: Negative  Neurological: Negative  Hematological: Negative  Psychiatric/Behavioral: Negative  Objective: There were no vitals taken for this visit  Physical Exam  Pulmonary:      Effort: Pulmonary effort is normal  No respiratory distress  Neurological:      General: No focal deficit present  Mental Status: He is alert and oriented to person, place, and time  Psychiatric:         Mood and Affect: Mood normal          Behavior: Behavior normal          Thought Content:  Thought content normal          Judgment: Judgment normal

## 2022-04-08 RX ORDER — OMEPRAZOLE 10 MG/1
CAPSULE, DELAYED RELEASE ORAL
Qty: 90 CAPSULE | Refills: 3 | Status: SHIPPED | OUTPATIENT
Start: 2022-04-08 | End: 2022-05-05

## 2022-04-12 ENCOUNTER — TELEPHONE (OUTPATIENT)
Dept: FAMILY MEDICINE CLINIC | Facility: CLINIC | Age: 82
End: 2022-04-12

## 2022-04-20 ENCOUNTER — TELEPHONE (OUTPATIENT)
Dept: FAMILY MEDICINE CLINIC | Facility: CLINIC | Age: 82
End: 2022-04-20

## 2022-04-20 DIAGNOSIS — I63.9 CEREBROVASCULAR ACCIDENT (CVA), UNSPECIFIED MECHANISM (HCC): ICD-10-CM

## 2022-04-20 DIAGNOSIS — Z74.09 POOR MOBILITY: ICD-10-CM

## 2022-04-20 DIAGNOSIS — I61.9 CEREBRAL HEMORRHAGE (HCC): Primary | ICD-10-CM

## 2022-04-20 NOTE — TELEPHONE ENCOUNTER
Kandi Erickson called today from [facility]  Referral to [facility] to their wheel chair clinic (PT DEPARTMENT)  for a wheel chair fitting  This is needed for diagnosis for CVA  We can fax over to fax number of [phone] to PT Department for JOSÉ MIGUEL  We can call Kandi Erickson to let her know at [phone]

## 2022-04-28 ENCOUNTER — TELEPHONE (OUTPATIENT)
Dept: FAMILY MEDICINE CLINIC | Facility: CLINIC | Age: 82
End: 2022-04-28

## 2022-04-28 NOTE — TELEPHONE ENCOUNTER
FYI: Josie Gill is a physical therapist with [facility] physical therapy  Branden Webb has not had any physical therapy this week ,due to they are awaiting authorization from [employer]     Pt's number is [phone]

## 2022-05-04 DIAGNOSIS — K21.9 GASTROESOPHAGEAL REFLUX DISEASE, UNSPECIFIED WHETHER ESOPHAGITIS PRESENT: ICD-10-CM

## 2022-05-05 RX ORDER — OMEPRAZOLE 10 MG/1
CAPSULE, DELAYED RELEASE ORAL
Qty: 90 CAPSULE | Refills: 3 | Status: SHIPPED | OUTPATIENT
Start: 2022-05-05 | End: 2022-06-17

## 2022-05-11 ENCOUNTER — HOSPITAL ENCOUNTER (OUTPATIENT)
Dept: INFUSION CENTER | Facility: CLINIC | Age: 82
End: 2022-05-11

## 2022-05-17 ENCOUNTER — TELEPHONE (OUTPATIENT)
Dept: HEMATOLOGY ONCOLOGY | Facility: CLINIC | Age: 82
End: 2022-05-17

## 2022-05-17 NOTE — TELEPHONE ENCOUNTER
Daughter said she forgot to call to R/S [facility] visit today due to he had another dr visit also she will call back to R/S after he gets his blood work done

## 2022-05-26 ENCOUNTER — CONSULT (OUTPATIENT)
Dept: GASTROENTEROLOGY | Facility: MEDICAL CENTER | Age: 82
End: 2022-05-26
Payer: COMMERCIAL

## 2022-05-26 VITALS
HEART RATE: 55 BPM | BODY MASS INDEX: 21.79 KG/M2 | SYSTOLIC BLOOD PRESSURE: 108 MMHG | WEIGHT: 123 LBS | TEMPERATURE: 97.1 F | DIASTOLIC BLOOD PRESSURE: 67 MMHG

## 2022-05-26 DIAGNOSIS — K59.00 CONSTIPATION, UNSPECIFIED CONSTIPATION TYPE: Primary | ICD-10-CM

## 2022-05-26 DIAGNOSIS — I69.391 DYSPHAGIA DUE TO OLD STROKE: ICD-10-CM

## 2022-05-26 DIAGNOSIS — K21.9 GASTROESOPHAGEAL REFLUX DISEASE, UNSPECIFIED WHETHER ESOPHAGITIS PRESENT: ICD-10-CM

## 2022-05-26 PROCEDURE — 3078F DIAST BP <80 MM HG: CPT | Performed by: STUDENT IN AN ORGANIZED HEALTH CARE EDUCATION/TRAINING PROGRAM

## 2022-05-26 PROCEDURE — 99204 OFFICE O/P NEW MOD 45 MIN: CPT | Performed by: STUDENT IN AN ORGANIZED HEALTH CARE EDUCATION/TRAINING PROGRAM

## 2022-05-26 PROCEDURE — 1036F TOBACCO NON-USER: CPT | Performed by: STUDENT IN AN ORGANIZED HEALTH CARE EDUCATION/TRAINING PROGRAM

## 2022-05-26 PROCEDURE — 3074F SYST BP LT 130 MM HG: CPT | Performed by: STUDENT IN AN ORGANIZED HEALTH CARE EDUCATION/TRAINING PROGRAM

## 2022-05-26 NOTE — PROGRESS NOTES
[facility] - Outpatient Consultation  Annie Goldberg 78 y o  male MRN: [id number]  Encounter: [id number]          ASSESSMENT AND PLAN:    82M h/o CVA 9/2021 with left sided defects and PEG placement and untreated prostate cancer referred for PEG management and GERD  Currently with no GI symptoms in setting of omeprazole 10 mg daily use  PEG site appears healthy today  1  Gastroesophageal reflux disease, unspecified whether esophagitis present  No mucosal lesions seen during placement of PEG  - Ambulatory Referral to Gastroenterology  - Continue omeprazole 10mg daily  If symptoms return, would increase dosing    2  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  - Ambulatory Referral to Gastroenterology  - s/p PEG with site c/d/i  If continues to meet nutritional needs orally, could consider removal if patient desires    3  Constipation, unspecified constipation type  Currently not requiring suppositories since transitioning back to oral diet  Should constipation become an issue, would trial 17g miralax daily in 8oz water via PEG with bisacodyl suppositories PRN    6 week virtual visit follow up      ______________________________________________________________________    HPI:    History primarily obtained from patient's daughter/caregiver who also acted as  for basic questions  Couldn't get omeprazole for a while due to liquid vs pill issue  When off PPI, was having a lot of liquid regurgitation, nausea, epigastric pain  Now taking omeprazole 10 mg open capsule with applesauce and no symptoms  BM are more regular now that he's eating solid food  Has not need suppository in long time    Stroke September 2021  General surgery [facility]) placed PEG 9/30 with no esophageal or gastric mucosal abnormalities noted on EGD  Has intermittently been G tube dependent for nutrition  Currently doing well with occasionally getting G tube feed if poor PO intake but not regularly requiring      REVIEW OF SYSTEMS:  Full ROS could not be obtained due to patient sleepiness/lack of interaction  Historical Information   Past Medical History:   Diagnosis Date    Hypertension     Prostate cancer ([address] )     no surgery required     Past Surgical History:   Procedure Laterality Date    GASTROSTOMY TUBE PLACEMENT N/A 9/30/2021    Procedure: INSERTION PEG TUBE;  Surgeon: Katina Nicole DO;  [facility]: [facility];  Service: General    HERNIA REPAIR       Social History   Social History     Substance and Sexual Activity   Alcohol Use Never     Social History     Substance and Sexual Activity   Drug Use Never     Social History     Tobacco Use   Smoking Status Never Smoker   Smokeless Tobacco Never Used     Family History   Problem Relation Age of Onset    No Known Problems Mother     No Known Problems Father        Meds/Allergies       Current Outpatient Medications:     acetaminophen (TYLENOL) 325 mg tablet    amLODIPine (NORVASC) 5 mg tablet    bisacodyl (DULCOLAX) 10 mg suppository    doxazosin (CARDURA) 2 mg tablet    finasteride (PROSCAR) 5 mg tablet    folic acid (FOLVITE) 1 mg tablet    insulin aspart (NovoLOG FlexPen) 100 UNIT/ML injection pen    Insulin Pen Needle (PEN NEEDLES 29GX1/2") 29G X 12MM MISC    Lancets (accu-chek safe-t pro) lancets    lisinopril (ZESTRIL) 5 mg tablet    omeprazole (PriLOSEC) 10 mg delayed release capsule    omeprazole 2 mg/mL in sodium bicarbonate    ondansetron (Zofran ODT) 4 mg disintegrating tablet    No Known Allergies        Objective     Blood pressure 108/67, pulse 55, temperature (!) 97 1 °F (36 2 °C), weight 55 8 kg (123 lb)  Body mass index is 21 79 kg/m²  PHYSICAL EXAM:      General Appearance:   Elderly man in stretcher in NAD   HEENT:   Normocephalic, atraumatic, anicteric       Neck:  Supple, symmetrical, trachea midline   Lungs:   Clear to auscultation bilaterally; no rales, rhonchi or wheezing; respirations unlabored    Heart[de-identified]   Regular rate and rhythm; no murmur, rub, or gallop  Abdomen:   PEG tube in LUQ  Bumper at appropriate height with tube that can freely rotate but not excessive slack  Mild granulation tissue at 5' position on tube  Abdomen soft with no tenderness    Genitalia:   Deferred    Rectal:   Deferred    Extremities:  Left sided paralysis    Pulses:  2+ and symmetric    Skin:  No jaundice, rashes, or lesions    Lymph nodes:  No palpable cervical lymphadenopathy        Lab Results:   No visits with results within 1 Day(s) from this visit     Latest known visit with results is:   Admission on 02/17/2022, Discharged on 02/23/2022   Component Date Value    WBC 02/17/2022 13 60 (A)    RBC 02/17/2022 3 58 (A)    Hemoglobin 02/17/2022 11 3 (A)    Hematocrit 02/17/2022 35 1 (A)    MCV 02/17/2022 98     MCH 02/17/2022 31 6     MCHC 02/17/2022 32 2     RDW 02/17/2022 13 3     MPV 02/17/2022 10 0     Platelets 96/36/4687 342     nRBC 02/17/2022 0     Neutrophils Relative 02/17/2022 78 (A)    Immat GRANS % 02/17/2022 1     Lymphocytes Relative 02/17/2022 12 (A)    Monocytes Relative 02/17/2022 9     Eosinophils Relative 02/17/2022 0     Basophils Relative 02/17/2022 0     Neutrophils Absolute 02/17/2022 10 64 (A)    Immature Grans Absolute 02/17/2022 0 07     Lymphocytes Absolute 02/17/2022 1 60     Monocytes Absolute 02/17/2022 1 22     Eosinophils Absolute 02/17/2022 0 02     Basophils Absolute 02/17/2022 0 05     Sodium 02/17/2022 137     Potassium 02/17/2022 4 7     Chloride 02/17/2022 100     CO2 02/17/2022 27     ANION GAP 02/17/2022 10     BUN 02/17/2022 34 (A)    Creatinine 02/17/2022 0 78     Glucose 02/17/2022 116     Calcium 02/17/2022 9 1     Corrected Calcium 02/17/2022 10 0     AST 02/17/2022 14     ALT 02/17/2022 12     Alkaline Phosphatase 02/17/2022 37 (A)    Total Protein 02/17/2022 7 2     Albumin 02/17/2022 2 9 (A)    Total Bilirubin 02/17/2022 0 49     eGFR 02/17/2022 83     Lipase 02/17/2022 110     hs TnI 0hr 02/17/2022 6     hs TnI 2hr 02/18/2022 6     Delta 2hr hsTnI 02/18/2022 0     POC Glucose 02/18/2022 112     POC Glucose 02/18/2022 101     Sodium 02/18/2022 136     Potassium 02/18/2022 4 0     Chloride 02/18/2022 100     CO2 02/18/2022 25     ANION GAP 02/18/2022 11     BUN 02/18/2022 29 (A)    Creatinine 02/18/2022 0 64     Glucose 02/18/2022 92     Calcium 02/18/2022 9 2     eGFR 02/18/2022 90     WBC 02/18/2022 11 24 (A)    RBC 02/18/2022 3 35 (A)    Hemoglobin 02/18/2022 10 6 (A)    Hematocrit 02/18/2022 32 6 (A)    MCV 02/18/2022 97     MCH 02/18/2022 31 6     MCHC 02/18/2022 32 5     RDW 02/18/2022 13 3     MPV 02/18/2022 9 6     Platelets 19/96/7339 313     nRBC 02/18/2022 0     Neutrophils Relative 02/18/2022 76 (A)    Immat GRANS % 02/18/2022 0     Lymphocytes Relative 02/18/2022 14     Monocytes Relative 02/18/2022 10     Eosinophils Relative 02/18/2022 0     Basophils Relative 02/18/2022 0     Neutrophils Absolute 02/18/2022 8 49 (A)    Immature Grans Absolute 02/18/2022 0 05     Lymphocytes Absolute 02/18/2022 1 54     Monocytes Absolute 02/18/2022 1 12     Eosinophils Absolute 02/18/2022 0 02     Basophils Absolute 02/18/2022 0 02     POC Glucose 02/18/2022 110     POC Glucose 02/18/2022 116     POC Glucose 02/18/2022 137     POC Glucose 02/18/2022 222 (A)    POC Glucose 02/19/2022 184 (A)    POC Glucose 02/19/2022 148 (A)    POC Glucose 02/19/2022 185 (A)    POC Glucose 02/20/2022 257 (A)    POC Glucose 02/20/2022 181 (A)    POC Glucose 02/20/2022 129     POC Glucose 02/20/2022 157 (A)    POC Glucose 02/21/2022 134     POC Glucose 02/21/2022 [address] Supplier Name 02/21/2022 [facility] [facility] [id number] [facility] Supplier Phone Number 02/21/2022 [phone]     Order Status 02/21/2022 Delivery Successful     Delivery Request Date 02/21/2022 02/21/2022     Date Delivered  02/21/2022 02/22/2022     Supplier Name 02/21/2022 02/22/2022     Item Description 02/21/2022 Jevity, 1 0 Wilder, Unflavored, Case (24)     POC Glucose 02/21/2022 [address] Name 02/21/2022 [employer] [facility] [place]     Supplier Phone Number 02/21/2022 [phone]     Order Status 02/21/2022 Delivery Successful     Delivery Request Date 02/21/2022 02/21/2022     Date Delivered  02/21/2022 02/22/2022     Item Description 02/21/2022 Enteral Feeding Kit, Bolus / Syringe     Item Description 02/21/2022 Irrigation Piston Syringe, Enteral Package     POC Glucose 02/21/2022 189 (A)    POC Glucose 02/22/2022 145 (A)    POC Glucose 02/22/2022 87     POC Glucose 02/22/2022 110     POC Glucose 02/22/2022 138     POC Glucose 02/23/2022 123     POC Glucose 02/23/2022 107     POC Glucose 02/23/2022 103     POC Glucose 02/23/2022 116          Radiology Results:   No results found

## 2022-06-10 ENCOUNTER — TELEPHONE (OUTPATIENT)
Dept: FAMILY MEDICINE CLINIC | Facility: CLINIC | Age: 82
End: 2022-06-10

## 2022-06-10 NOTE — TELEPHONE ENCOUNTER
Roman Sanabria called today  They are in need of a wheelchair EVALUATION amb referral to [facility]  Please include the proper diagnosis  We can fax to [phone]

## 2022-06-10 NOTE — TELEPHONE ENCOUNTER
I called [facility] [phone], left voicemail message to have a return call to question if this is an amb referral to OT or PT or if it needs to be written in any special manner

## 2022-06-17 DIAGNOSIS — I61.9 CEREBRAL HEMORRHAGE (HCC): ICD-10-CM

## 2022-06-17 DIAGNOSIS — I63.9 CEREBROVASCULAR ACCIDENT (CVA), UNSPECIFIED MECHANISM (HCC): Primary | ICD-10-CM

## 2022-06-17 DIAGNOSIS — K21.9 GASTROESOPHAGEAL REFLUX DISEASE, UNSPECIFIED WHETHER ESOPHAGITIS PRESENT: ICD-10-CM

## 2022-06-17 RX ORDER — OMEPRAZOLE 10 MG/1
CAPSULE, DELAYED RELEASE ORAL
Qty: 90 CAPSULE | Refills: 3 | Status: SHIPPED | OUTPATIENT
Start: 2022-06-17

## 2022-06-17 NOTE — TELEPHONE ENCOUNTER
Wheelchair clinic called back and stated they just need a script for a wheelchair with the appropriate diagnosis attached  Script placed and faxed

## 2022-06-17 NOTE — TELEPHONE ENCOUNTER
I called the Wheelchair clinic at [facility] again today and left a message for someone to give us a call back, so we know exactly our script needs to state

## 2022-06-21 ENCOUNTER — NURSE TRIAGE (OUTPATIENT)
Dept: OTHER | Facility: OTHER | Age: 82
End: 2022-06-21

## 2022-06-21 NOTE — TELEPHONE ENCOUNTER
Regarding: G-tube not flushing correctly  ----- Message from Lali Brush sent at 6/21/2022  8:33 AM EDT -----  "My father has had a G-tube since September and right now it does not seem to working when flushed; it seems to coming back "

## 2022-06-21 NOTE — TELEPHONE ENCOUNTER
I called Althea Zaidi  She has already tried flushing with warm water  I recommend trying cola (60 mL) and if that is ineffective, she needs to contact Dr Naun Rabago (#[phone]) because he originally placed the tube in September 2021  She expressed understanding and all questions were answered

## 2022-06-21 NOTE — TELEPHONE ENCOUNTER
Patient's PEG is only used for feedings when patient is not able to eat  The last feeding was 1-2 days ago  Patient's tube is usually flushed regularly, but his daughter reports it is getting difficult to flush and she believes it is starting to clog  She would like a call back to advise  Reason for Disposition   [1] G-tube (or PEG) is clogged AND [2] irrigation has been attempted without success    Answer Assessment - Initial Assessment Questions  1  MAIN CONCERN OR SYMPTOM:  "What is your main concern right now?" "What question do you have?" "What's the main symptom you're worried about?" (e g , fever, pain, redness, swelling)      Not flushing  2  ONSET: "When did the symptom or problem start (or worsen)?" (minutes, hours, days, weeks)      6/19/22  3  WHAT TYPE: "What type of feeding tube is it?" (e g , NG tube, NJ tube, G tube, GJ tube, J tube, PEG)  PEG  4  WHEN INSERTED: "When was the tube put in", "Has it been changed, if so when?"      9/2021  5  WHO INSERTED: "Do you recall who put the tube in?"      Gan  6  FEEDINGS: "Has the type, rate or concentration of the tube feedings changed?"      Denies   7  MEDICATIONS:  "Are you taking any medications via your feeding tube?"  "Are these medications liquid or crushed given in your feeding tube?"      Denies   8  VOMITING and DIARRHEA: "Is there new vomiting or diarrhea?" (e g , number of time; description)      Denies   9  OTHER SYMPTOMS: "What other symptoms are you having?" (e g , shortness of breath, fever, abdominal pain)      Denies   10  HOME HEALTH NURSE: "Do you have a home health (visiting) nurse?"        Denies    Protocols used:  FEEDING TUBE SYMPTOMS AND QUESTIONS[facility]

## 2022-06-24 DIAGNOSIS — I10 PRIMARY HYPERTENSION: ICD-10-CM

## 2022-06-24 RX ORDER — LISINOPRIL 5 MG/1
TABLET ORAL
Qty: 90 TABLET | Refills: 1 | Status: SHIPPED | OUTPATIENT
Start: 2022-06-24

## 2022-06-30 DIAGNOSIS — R39.9 LOWER URINARY TRACT SYMPTOMS: ICD-10-CM

## 2022-06-30 DIAGNOSIS — C61 PROSTATE CANCER (HCC): ICD-10-CM

## 2022-06-30 RX ORDER — DOXAZOSIN 2 MG/1
TABLET ORAL
Qty: 90 TABLET | Refills: 1 | Status: SHIPPED | OUTPATIENT
Start: 2022-06-30

## 2022-06-30 RX ORDER — FINASTERIDE 5 MG/1
TABLET, FILM COATED ORAL
Qty: 90 TABLET | Refills: 1 | Status: SHIPPED | OUTPATIENT
Start: 2022-06-30

## 2022-07-10 DIAGNOSIS — C61 PROSTATE CANCER (HCC): Primary | ICD-10-CM

## 2022-07-14 ENCOUNTER — OFFICE VISIT (OUTPATIENT)
Dept: SURGERY | Facility: CLINIC | Age: 82
End: 2022-07-14
Payer: COMMERCIAL

## 2022-07-14 VITALS — HEIGHT: 63 IN | BODY MASS INDEX: 21.79 KG/M2

## 2022-07-14 DIAGNOSIS — Z93.1 S/P PERCUTANEOUS ENDOSCOPIC GASTROSTOMY (PEG) TUBE PLACEMENT (HCC): Primary | ICD-10-CM

## 2022-07-14 PROCEDURE — 99213 OFFICE O/P EST LOW 20 MIN: CPT | Performed by: SURGERY

## 2022-07-14 PROCEDURE — 1160F RVW MEDS BY RX/DR IN RCRD: CPT | Performed by: SURGERY

## 2022-07-14 NOTE — PROGRESS NOTES
Office Visit - General Surgery  Bob Ornelas MRN: [id number]  Encounter: [id number]    Assessment and Plan  Problem List Items Addressed This Visit    None     82M s/p PEG insertion now mary alice full meals/meds PO    - PEG dc'ed in office  - granuloma tissue at site cauterized w silver nitrite  - f/u in office x1 wk for wound eval    Chief Complaint:  Bob Ornelas is a 78 y o  male who presents for Follow-up (Consult g-tube, not using for nourishment )    Subjective  82M s/p PEG insertion now mary alice full meals PO with meds  Has not reportedly used PEG [id number]  Intermittent bleeding noted at insertion site secondary to granulomatous tissue at insertion site      Past Medical History:   Diagnosis Date    Hypertension     Prostate cancer ([address] )     no surgery required       Past Surgical History:   Procedure Laterality Date    GASTROSTOMY TUBE PLACEMENT N/A 9/30/2021    Procedure: INSERTION PEG TUBE;  Surgeon: Chapo Gregory DO;  [facility];  Service: General    HERNIA REPAIR         Family History   Problem Relation Age of Onset    No Known Problems Mother     No Known Problems Father        Social History     Tobacco Use    Smoking status: Never Smoker    Smokeless tobacco: Never Used   Vaping Use    Vaping Use: Never used   Substance Use Topics    Alcohol use: Never    Drug use: Never        Medications  Current Outpatient Medications on File Prior to Visit   Medication Sig Dispense Refill    acetaminophen (TYLENOL) 325 mg tablet 2 tablets (650 mg total) by Per G Tube route every 6 (six) hours as needed for mild pain or headaches 30 tablet 0    amLODIPine (NORVASC) 5 mg tablet TAKE 1 TABLET(5 MG) VIA GTUBE DAILY 90 tablet 3    bisacodyl (DULCOLAX) 10 mg suppository Insert 10 mg into the rectum as needed for constipation      doxazosin (CARDURA) 2 mg tablet TAKE 1 TABLET(2 MG) VIA GTUBE DAILY 90 tablet 1    finasteride (PROSCAR) 5 mg tablet TAKE 1 TABLET(5 MG) BY MOUTH DAILY 90 tablet 1    folic acid (FOLVITE) 1 mg tablet 1 tablet (1 mg total) by Per PEG Tube route daily 90 tablet 1    insulin aspart (NovoLOG FlexPen) 100 UNIT/ML injection pen Inject according to sliding scale results every 6 hours 15 mL 0    Insulin Pen Needle (PEN NEEDLES 29GX1/2") 29G X 12MM MISC Use 4 (four) times a day 100 each 0    Lancets (accu-chek safe-t pro) lancets Use as instructed 100 each 0    lisinopril (ZESTRIL) 5 mg tablet TAKE 1 TABLET(5 MG) BY MOUTH DAILY 90 tablet 1    omeprazole (PriLOSEC) 10 mg delayed release capsule TAKE 1 CAPSULE(10 MG) BY MOUTH DAILY 90 capsule 3    ondansetron (Zofran ODT) 4 mg disintegrating tablet Take 1 tablet (4 mg total) by mouth daily as needed for nausea or vomiting 20 tablet 0     No current facility-administered medications on file prior to visit  Allergies  No Known Allergies    Review of Systems   All other systems reviewed and are negative  Objective  There were no vitals filed for this visit  Physical Exam  Constitutional:       Appearance: Normal appearance  HENT:      Head: Atraumatic  Mouth/Throat:      Mouth: Mucous membranes are moist    Eyes:      Extraocular Movements: Extraocular movements intact  Cardiovascular:      Rate and Rhythm: Normal rate  Pulmonary:      Effort: Pulmonary effort is normal    Abdominal:      General: Abdomen is flat  There is no distension  Palpations: Abdomen is soft  Tenderness: There is no abdominal tenderness  Musculoskeletal:      Cervical back: Neck supple  Skin:     General: Skin is warm and dry  Neurological:      Mental Status: He is alert

## 2022-07-25 ENCOUNTER — OFFICE VISIT (OUTPATIENT)
Dept: SURGERY | Facility: CLINIC | Age: 82
End: 2022-07-25

## 2022-07-25 DIAGNOSIS — Z43.1 PEG (PERCUTANEOUS ENDOSCOPIC GASTROSTOMY) ADJUSTMENT/REPLACEMENT/REMOVAL (HCC): Primary | ICD-10-CM

## 2022-07-25 PROCEDURE — 99024 POSTOP FOLLOW-UP VISIT: CPT | Performed by: SURGERY

## 2022-07-25 NOTE — PROGRESS NOTES
Office Visit - General Surgery  Caitlin Moss MRN: [id number]  Encounter: [id number]    Assessment and Plan  Problem List Items Addressed This Visit    None         Chief Complaint:  Caitlin Moss is a 78 y o  male who presents for Wound Check (G-tube wound check )    Subjective      Past Medical History:   Diagnosis Date    Hypertension     Prostate cancer ([facility] [address] )     no surgery required       Past Surgical History:   Procedure Laterality Date    GASTROSTOMY TUBE PLACEMENT N/A 9/30/2021    Procedure: INSERTION PEG TUBE;  Surgeon: Richard De La Torre DO;  [facility];  Service: General    HERNIA REPAIR         Family History   Problem Relation Age of Onset    No Known Problems Mother     No Known Problems Father        Social History     Tobacco Use    Smoking status: Never Smoker    Smokeless tobacco: Never Used   Vaping Use    Vaping Use: Never used   Substance Use Topics    Alcohol use: Never    Drug use: Never        Medications  Current Outpatient Medications on File Prior to Visit   Medication Sig Dispense Refill    acetaminophen (TYLENOL) 325 mg tablet 2 tablets (650 mg total) by Per G Tube route every 6 (six) hours as needed for mild pain or headaches 30 tablet 0    amLODIPine (NORVASC) 5 mg tablet TAKE 1 TABLET(5 MG) VIA GTUBE DAILY 90 tablet 3    bisacodyl (DULCOLAX) 10 mg suppository Insert 10 mg into the rectum as needed for constipation      doxazosin (CARDURA) 2 mg tablet TAKE 1 TABLET(2 MG) VIA GTUBE DAILY 90 tablet 1    finasteride (PROSCAR) 5 mg tablet TAKE 1 TABLET(5 MG) BY MOUTH DAILY 90 tablet 1    folic acid (FOLVITE) 1 mg tablet 1 tablet (1 mg total) by Per PEG Tube route daily 90 tablet 1    insulin aspart (NovoLOG FlexPen) 100 UNIT/ML injection pen Inject according to sliding scale results every 6 hours 15 mL 0    Insulin Pen Needle (PEN NEEDLES 29GX1/2") 29G X 12MM MISC Use 4 (four) times a day 100 each 0    Lancets (accu-chek safe-t pro) lancets Use as instructed 100 each 0    lisinopril (ZESTRIL) 5 mg tablet TAKE 1 TABLET(5 MG) BY MOUTH DAILY 90 tablet 1    omeprazole (PriLOSEC) 10 mg delayed release capsule TAKE 1 CAPSULE(10 MG) BY MOUTH DAILY 90 capsule 3    ondansetron (Zofran ODT) 4 mg disintegrating tablet Take 1 tablet (4 mg total) by mouth daily as needed for nausea or vomiting 20 tablet 0     No current facility-administered medications on file prior to visit  Allergies  No Known Allergies    Review of Systems    Objective  There were no vitals filed for this visit      Physical Exam

## 2022-07-25 NOTE — PROGRESS NOTES
Assessment/Plan:    No problem-specific Assessment & Plan notes found for this encounter  Problem List Items Addressed This Visit    None         Plan:  -Follow up in the office in 1 week, will evaluate at that time and consider use of silver nitrate   -Continue daily dressing changes and monitor for changes in volume or character of drainage       Subjective:      Patient ID: Yifan Hargrove is a 78 y o  male  78year old male s/p PEG removal in the office on 7/14/22 and cauterization with silver nitrate  He was seen in the office today  He expressed no acute concerns today  He is tolerating adequate PO intake  He denies pain at the previous PEG site  He denies changes in bowel habits  No nausea/ vomiting/ fever/ chills/ SOB  The following portions of the patient's history were reviewed and updated as appropriate: allergies, current medications, past family history, past medical history, past social history, past surgical history and problem list     Review of Systems   Constitutional: Negative for appetite change, chills, diaphoresis, fatigue and fever  Respiratory: Negative for chest tightness and shortness of breath  Gastrointestinal: Positive for constipation  Negative for abdominal distention, abdominal pain, nausea and vomiting  All other systems reviewed and are negative  Objective: There were no vitals taken for this visit  Physical Exam  Constitutional:       General: He is not in acute distress  Appearance: Normal appearance  He is normal weight  He is not ill-appearing  HENT:      Head: Normocephalic and atraumatic  Mouth/Throat:      Mouth: Mucous membranes are moist    Eyes:      General: No scleral icterus  Extraocular Movements: Extraocular movements intact  Conjunctiva/sclera: Conjunctivae normal    Cardiovascular:      Rate and Rhythm: Normal rate     Pulmonary:      Effort: Pulmonary effort is normal    Abdominal:      General: Abdomen is flat  There is no distension  Palpations: Abdomen is soft  Tenderness: There is no abdominal tenderness  Musculoskeletal:         General: Deformity present  Skin:     General: Skin is warm  Neurological:      Mental Status: He is alert

## 2022-07-29 ENCOUNTER — TELEMEDICINE (OUTPATIENT)
Dept: GASTROENTEROLOGY | Facility: MEDICAL CENTER | Age: 82
End: 2022-07-29
Payer: COMMERCIAL

## 2022-07-29 DIAGNOSIS — I69.391 DYSPHAGIA DUE TO OLD STROKE: ICD-10-CM

## 2022-07-29 DIAGNOSIS — K59.00 CONSTIPATION, UNSPECIFIED CONSTIPATION TYPE: ICD-10-CM

## 2022-07-29 DIAGNOSIS — K21.9 GASTROESOPHAGEAL REFLUX DISEASE, UNSPECIFIED WHETHER ESOPHAGITIS PRESENT: Primary | ICD-10-CM

## 2022-07-29 PROCEDURE — 99213 OFFICE O/P EST LOW 20 MIN: CPT | Performed by: STUDENT IN AN ORGANIZED HEALTH CARE EDUCATION/TRAINING PROGRAM

## 2022-07-29 NOTE — PROGRESS NOTES
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 and PEG placement and untreated prostate cancer seen by virtual visit for follow up of PEG management and GERD  PEG removed 7/14/22 by general surgery and meeting nutritional needs orally  GERD well controlled with low dose omeprazole  Discussed that benefit of continuing long term PPI outweigh risk  1  Gastroesophageal reflux disease, unspecified whether esophagitis present  2  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  -Continue omeprazole 10 mg daily    3  Constipation, unspecified constipation type  -Continue good hydration and diet rich in fiber  Miralax PO and bisacodyl suppository as needed    GI follow up PRN  ______________________________________________________________________    SUBJECTIVE:    Telemedicine consent    Patient: Abhilash Packer  Provider: Nury Valle MD  Provider located at [address] [address]    The patient was identified by name and date of birth  Abhilash Packer was informed that this is a telemedicine visit and that the visit is being conducted through Telephone  My office door was closed  No one else was in the room  He acknowledged consent and understanding of privacy and security of the video platform  The patient has agreed to participate and understands they can discontinue the visit at any time  Patient is aware this is a billable service  I spent 13 minutes with the patient during this visit  Visit was conducted with patient's daughter and caretaker Hazel Miller due to patient's mental status  PEG removed 7/14/22 by surgery after not having used it for several months  Doing well  Has been treated with silver nitrate to help with healing   Has follow up visit in person on Monday with surgery  Appetite is good  Meeting nutritional needs, using shakes as snacks  Weight is stable  Constipation well controlled now that eating a solid diet  Heartburn is well controlled with omeprazole 10 mg daily  REVIEW OF SYSTEMS IS OTHERWISE NEGATIVE  Historical Information   Past Medical History:   Diagnosis Date    Hypertension     Prostate cancer ([address] )     no surgery required     Past Surgical History:   Procedure Laterality Date    GASTROSTOMY TUBE PLACEMENT N/A 9/30/2021    Procedure: INSERTION PEG TUBE;  Surgeon: Mayco Egan DO;  [facility];  Service: General    HERNIA REPAIR       Social History   Social History     Substance and Sexual Activity   Alcohol Use Never     Social History     Substance and Sexual Activity   Drug Use Never     Social History     Tobacco Use   Smoking Status Never Smoker   Smokeless Tobacco Never Used     Family History   Problem Relation Age of Onset    No Known Problems Mother     No Known Problems Father        Meds/Allergies       Current Outpatient Medications:     acetaminophen (TYLENOL) 325 mg tablet    amLODIPine (NORVASC) 5 mg tablet    bisacodyl (DULCOLAX) 10 mg suppository    doxazosin (CARDURA) 2 mg tablet    finasteride (PROSCAR) 5 mg tablet    folic acid (FOLVITE) 1 mg tablet    insulin aspart (NovoLOG FlexPen) 100 UNIT/ML injection pen    Insulin Pen Needle (PEN NEEDLES 29GX1/2") 29G X 12MM MISC    Lancets (accu-chek safe-t pro) lancets    lisinopril (ZESTRIL) 5 mg tablet    omeprazole (PriLOSEC) 10 mg delayed release capsule    ondansetron (Zofran ODT) 4 mg disintegrating tablet    No Known Allergies        Objective     There were no vitals taken for this visit  There is no height or weight on file to calculate BMI        PHYSICAL EXAM:      Unable to perform physical exam due to telephone visit (daughter unable to do video visit)                                                  Lab Results:   No visits with results within 1 Day(s) from this visit     Latest known visit with results is:   Admission on 02/17/2022, Discharged on 02/23/2022   Component Date Value    WBC 02/17/2022 13 60 (A)    RBC 02/17/2022 3 58 (A)    Hemoglobin 02/17/2022 11 3 (A)    Hematocrit 02/17/2022 35 1 (A)    MCV 02/17/2022 98     MCH 02/17/2022 31 6     MCHC 02/17/2022 32 2     RDW 02/17/2022 13 3     MPV 02/17/2022 10 0     Platelets 25/25/8012 342     nRBC 02/17/2022 0     Neutrophils Relative 02/17/2022 78 (A)    Immat GRANS % 02/17/2022 1     Lymphocytes Relative 02/17/2022 12 (A)    Monocytes Relative 02/17/2022 9     Eosinophils Relative 02/17/2022 0     Basophils Relative 02/17/2022 0     Neutrophils Absolute 02/17/2022 10 64 (A)    Immature Grans Absolute 02/17/2022 0 07     Lymphocytes Absolute 02/17/2022 1 60     Monocytes Absolute 02/17/2022 1 22     Eosinophils Absolute 02/17/2022 0 02     Basophils Absolute 02/17/2022 0 05     Sodium 02/17/2022 137     Potassium 02/17/2022 4 7     Chloride 02/17/2022 100     CO2 02/17/2022 27     ANION GAP 02/17/2022 10     BUN 02/17/2022 34 (A)    Creatinine 02/17/2022 0 78     Glucose 02/17/2022 116     Calcium 02/17/2022 9 1     Corrected Calcium 02/17/2022 10 0     AST 02/17/2022 14     ALT 02/17/2022 12     Alkaline Phosphatase 02/17/2022 37 (A)    Total Protein 02/17/2022 7 2     Albumin 02/17/2022 2 9 (A)    Total Bilirubin 02/17/2022 0 49     eGFR 02/17/2022 83     Lipase 02/17/2022 110     hs TnI 0hr 02/17/2022 6     hs TnI 2hr 02/18/2022 6     Delta 2hr hsTnI 02/18/2022 0     POC Glucose 02/18/2022 112     POC Glucose 02/18/2022 101     Sodium 02/18/2022 136     Potassium 02/18/2022 4 0     Chloride 02/18/2022 100     CO2 02/18/2022 25     ANION GAP 02/18/2022 11     BUN 02/18/2022 29 (A)    Creatinine 02/18/2022 0 64     Glucose 02/18/2022 92     Calcium 02/18/2022 9 2     eGFR 02/18/2022 90     WBC 02/18/2022 11 24 (A)  RBC 02/18/2022 3 35 (A)    Hemoglobin 02/18/2022 10 6 (A)    Hematocrit 02/18/2022 32 6 (A)    MCV 02/18/2022 97     MCH 02/18/2022 31 6     MCHC 02/18/2022 32 5     RDW 02/18/2022 13 3     MPV 02/18/2022 9 6     Platelets 78/34/3893 313     nRBC 02/18/2022 0     Neutrophils Relative 02/18/2022 76 (A)    Immat GRANS % 02/18/2022 0     Lymphocytes Relative 02/18/2022 14     Monocytes Relative 02/18/2022 10     Eosinophils Relative 02/18/2022 0     Basophils Relative 02/18/2022 0     Neutrophils Absolute 02/18/2022 8 49 (A)    Immature Grans Absolute 02/18/2022 0 05     Lymphocytes Absolute 02/18/2022 1 54     Monocytes Absolute 02/18/2022 1 12     Eosinophils Absolute 02/18/2022 0 02     Basophils Absolute 02/18/2022 0 02     POC Glucose 02/18/2022 110     POC Glucose 02/18/2022 116     POC Glucose 02/18/2022 137     POC Glucose 02/18/2022 222 (A)    POC Glucose 02/19/2022 184 (A)    POC Glucose 02/19/2022 148 (A)    POC Glucose 02/19/2022 185 (A)    POC Glucose 02/20/2022 257 (A)    POC Glucose 02/20/2022 181 (A)    POC Glucose 02/20/2022 129     POC Glucose 02/20/2022 157 (A)    POC Glucose 02/21/2022 134     POC Glucose 02/21/2022 [address] Supplier Name 02/21/2022 [employer] - [id number] [facility] Supplier Phone Number 02/21/2022 [phone]     Order Status 02/21/2022 Delivery Successful     Delivery Request Date 02/21/2022 02/21/2022     Date Delivered  02/21/2022 02/22/2022     Supplier Name 02/21/2022 02/22/2022     Item Description 02/21/2022 Jevity, 1 0 Wilder, Unflavored, Case (24)     POC Glucose 02/21/2022 [address] Supplier Name 02/21/2022 [employer] - [id number] [facility] Supplier Phone Number 02/21/2022 [phone]     Order Status 02/21/2022 Delivery Successful     Delivery Request Date 02/21/2022 02/21/2022     Date Delivered  02/21/2022 02/22/2022     Item Description 02/21/2022 Enteral Feeding Kit, Bolus / Syringe     Item Description 02/21/2022 Irrigation Piston Syringe, Enteral Package     POC Glucose 02/21/2022 189 (A)    POC Glucose 02/22/2022 145 (A)    POC Glucose 02/22/2022 87     POC Glucose 02/22/2022 110     POC Glucose 02/22/2022 138     POC Glucose 02/23/2022 123     POC Glucose 02/23/2022 107     POC Glucose 02/23/2022 103     POC Glucose 02/23/2022 116          Radiology Results:   No results found

## 2022-08-01 ENCOUNTER — OFFICE VISIT (OUTPATIENT)
Dept: SURGERY | Facility: CLINIC | Age: 82
End: 2022-08-01

## 2022-08-01 VITALS — TEMPERATURE: 97.8 F | BODY MASS INDEX: 21.79 KG/M2 | HEIGHT: 63 IN

## 2022-08-01 DIAGNOSIS — Z43.1 PEG (PERCUTANEOUS ENDOSCOPIC GASTROSTOMY) ADJUSTMENT/REPLACEMENT/REMOVAL (HCC): Primary | ICD-10-CM

## 2022-08-01 PROCEDURE — 99024 POSTOP FOLLOW-UP VISIT: CPT | Performed by: SURGERY

## 2022-08-01 NOTE — ASSESSMENT & PLAN NOTE
Site of previous PEG tube healing well. Minimal granulation tissue treated in the office today. Continue with daily dressing changes. Follow up in the office in 2 weeks or sooner if needed.

## 2022-08-01 NOTE — PROGRESS NOTES
Assessment/Plan:    PEG (percutaneous endoscopic gastrostomy) adjustment/replacement/removal ([address])  Site of previous PEG tube healing well. Minimal granulation tissue treated in the office today. Continue with daily dressing changes. Follow up in the office in 2 weeks or sooner if needed. Problem List Items Addressed This Visit        Other    PEG (percutaneous endoscopic gastrostomy) adjustment/replacement/removal ([address]) - Primary     Site of previous PEG tube healing well. Minimal granulation tissue treated in the office today. Continue with daily dressing changes. Follow up in the office in 2 weeks or sooner if needed. Subjective:      Patient ID: Pippa Sanchez is a 78 y.o. male. Patient was examined in the office today. His daughter was present and helped provide history. His PEG was discontinued in the office on 7/14. He followed up in the office on 7/25. Today in the office he reports tolerating PO intake, no changes in bowel habits, and no pain. The following portions of the patient's history were reviewed and updated as appropriate: allergies, current medications, past family history, past medical history, past social history, past surgical history and problem list.    Review of Systems   Constitutional: Negative. Negative for appetite change, chills, fatigue and fever. HENT: Negative. Eyes: Negative. Respiratory: Negative. Cardiovascular: Negative. Gastrointestinal: Negative. Negative for nausea and vomiting. Endocrine: Negative. Genitourinary: Negative. Musculoskeletal: Negative. Allergic/Immunologic: Negative. Neurological: Negative. Hematological: Negative. Psychiatric/Behavioral: Negative. Objective:      Temp 97.8 °F (36.6 °C) (Core)   Ht 5' 3" (1.6 m)   BMI 21.79 kg/m²          Physical Exam  Constitutional:       General: He is not in acute distress. Appearance: He is normal weight. He is not ill-appearing.    HENT: Head: Normocephalic and atraumatic. Nose: Nose normal.      Mouth/Throat:      Mouth: Mucous membranes are moist.   Eyes:      General: No scleral icterus. Extraocular Movements: Extraocular movements intact. Conjunctiva/sclera: Conjunctivae normal.   Cardiovascular:      Rate and Rhythm: Normal rate. Pulmonary:      Effort: Pulmonary effort is normal.   Abdominal:      General: Abdomen is flat. There is no distension. Tenderness: There is no abdominal tenderness. There is no guarding. Comments: Pervious peg site with hypergranulation tissue. See image after silver nitrate treatment. Musculoskeletal:         General: Deformity present. Skin:     General: Skin is warm. Neurological:      Mental Status: He is alert and oriented to person, place, and time.    Psychiatric:         Mood and Affect: Mood normal.         Behavior: Behavior normal.

## 2022-08-15 ENCOUNTER — OFFICE VISIT (OUTPATIENT)
Dept: SURGERY | Facility: CLINIC | Age: 82
End: 2022-08-15
Payer: COMMERCIAL

## 2022-08-15 ENCOUNTER — TELEMEDICINE (OUTPATIENT)
Dept: FAMILY MEDICINE CLINIC | Facility: CLINIC | Age: 82
End: 2022-08-15
Payer: COMMERCIAL

## 2022-08-15 VITALS — BODY MASS INDEX: 21.79 KG/M2 | HEIGHT: 63 IN

## 2022-08-15 DIAGNOSIS — R44.3 HALLUCINATIONS: ICD-10-CM

## 2022-08-15 DIAGNOSIS — R41.0 CONFUSION: ICD-10-CM

## 2022-08-15 DIAGNOSIS — E85.4 CEREBRAL AMYLOID ANGIOPATHY (HCC): ICD-10-CM

## 2022-08-15 DIAGNOSIS — Z43.1 PEG (PERCUTANEOUS ENDOSCOPIC GASTROSTOMY) ADJUSTMENT/REPLACEMENT/REMOVAL (HCC): Primary | ICD-10-CM

## 2022-08-15 DIAGNOSIS — I10 PRIMARY HYPERTENSION: Primary | ICD-10-CM

## 2022-08-15 DIAGNOSIS — I68.0 CEREBRAL AMYLOID ANGIOPATHY (HCC): ICD-10-CM

## 2022-08-15 PROCEDURE — 1160F RVW MEDS BY RX/DR IN RCRD: CPT | Performed by: FAMILY MEDICINE

## 2022-08-15 PROCEDURE — 99214 OFFICE O/P EST MOD 30 MIN: CPT | Performed by: SURGERY

## 2022-08-15 PROCEDURE — 99213 OFFICE O/P EST LOW 20 MIN: CPT | Performed by: FAMILY MEDICINE

## 2022-08-15 NOTE — PROGRESS NOTES
Assessment/Plan:    PEG (percutaneous endoscopic gastrostomy) adjustment/replacement/removal ([facility] [address] )  PEG removed 7/14 in office  Silver nitrate to hypergranulation tissue on 8/1, comes back today for wound check  Minimal drainage per daughter, patient is tolerating a regular diet and having good bowel function  Plan:  Silver nitrate applied in office today  OK to leave open to air or can cover to protect clothing as needed  Follow-up PRN                Subjective:      Patient ID: Franck Butt is a 78 y o  male who presents for wound check ~1 month s/p office PEG removal     The following portions of the patient's history were reviewed and updated as appropriate:   Review of Systems   Constitutional: Negative for activity change, appetite change, chills and fever  HENT: Negative  Negative for congestion, dental problem, ear pain, facial swelling, nosebleeds, sore throat and trouble swallowing  Eyes: Negative  Negative for pain and visual disturbance  Respiratory: Negative  Negative for apnea and shortness of breath  Cardiovascular: Negative  Negative for chest pain and palpitations  Gastrointestinal: Negative  Negative for abdominal pain, anal bleeding, blood in stool, nausea and vomiting  Endocrine: Negative  Negative for cold intolerance and heat intolerance  Genitourinary: Negative  Negative for dysuria and hematuria  Musculoskeletal: Negative  Negative for gait problem and joint swelling  Skin: Positive for wound (PEG site)  Negative for color change  Allergic/Immunologic: Negative  Neurological: Negative  Negative for dizziness, seizures, light-headedness and numbness  Hematological: Negative  Psychiatric/Behavioral: Negative  Negative for behavioral problems and hallucinations  Objective:      Ht 5' 3" (1 6 m)   BMI 21 79 kg/m²          Physical Exam  Vitals and nursing note reviewed  Constitutional:       Appearance: He is well-developed     HENT: Head: Normocephalic and atraumatic  Right Ear: External ear normal       Left Ear: External ear normal       Mouth/Throat:      Mouth: Mucous membranes are moist       Pharynx: No oropharyngeal exudate  Eyes:      Conjunctiva/sclera: Conjunctivae normal       Pupils: Pupils are equal, round, and reactive to light  Cardiovascular:      Rate and Rhythm: Normal rate and regular rhythm  Pulmonary:      Effort: Pulmonary effort is normal       Breath sounds: Normal breath sounds  Abdominal:      Palpations: Abdomen is soft  Comments: PEG site healing well, minimal hypergranulation tissue, significantly improved from last week  Minimal SS on dressing   Skin:     General: Skin is warm and dry  Neurological:      Mental Status: He is alert and oriented to person, place, and time

## 2022-08-15 NOTE — PROGRESS NOTES
It was my intent to perform this visit via video technology but the patient was not able to do a video connection so the visit was completed via audio telephone only  Virtual Regular Visit    Verification of patient location:    Patient is located in the following state in which I hold an active license [place]      Assessment/Plan:    Problem List Items Addressed This Visit        Cardiovascular and Mediastinum    Hypertension - Primary    Relevant Orders    Comprehensive metabolic panel    Cerebral amyloid angiopathy ([facility] [address] )      Other Visit Diagnoses     Hallucinations        Relevant Orders    Comprehensive metabolic panel    CBC and differential    UA w Reflex to Microscopic w Reflex to Culture -Lab Collect    Ammonia    Confusion        Relevant Orders    Comprehensive metabolic panel    CBC and differential    UA w Reflex to Microscopic w Reflex to Culture -Lab Collect    Ammonia               Reason for visit is   Chief Complaint   Patient presents with    Virtual Regular Visit        Encounter provider Rosette Martines DO    Provider located at [address] [place] [address] [address]  [facility] [id number] [phone]      Recent Visits  No visits were found meeting these conditions  Showing recent visits within past 7 days and meeting all other requirements  Today's Visits  Date Type Provider Dept   08/15/22 Telemedicine Rosette Martines, [address] [place] [address] Primary Care   Showing today's visits and meeting all other requirements  Future Appointments  No visits were found meeting these conditions  Showing future appointments within next 150 days and meeting all other requirements       The patient was identified by name and date of birth  Everette Gotti was informed that this is a telemedicine visit and that the visit is being conducted through Telephone  My office door was closed  No one else was in the room    He acknowledged consent and understanding of privacy and security of the video platform  The patient has agreed to participate and understands they can discontinue the visit at any time  Patient is aware this is a billable service  Subjective  Bailey Cuellar is a 78 y o  male Mood changes and hallucinations,   Fartun Zavaletaer (daughter) [phone] LMW   Mood changes and hallucinations,   Fartun Rodriguez (daughter) [phone] LMW  Hx of HTN and CAA, confusion and hallucinations recently  This is intermittent  He no longer is on tube feedings and PEG removed  No fever or other complaints  Daughter concerned with these hallucinations and confusion at times  He is eating and blood sugars have been stable          Past Medical History:   Diagnosis Date    Hypertension     Prostate cancer ([facility] [address] )     no surgery required       Past Surgical History:   Procedure Laterality Date    GASTROSTOMY TUBE PLACEMENT N/A 9/30/2021    Procedure: INSERTION PEG TUBE;  Surgeon: Radhika Huston DO;  Location: [facility];  Service: General    HERNIA REPAIR         Current Outpatient Medications   Medication Sig Dispense Refill    acetaminophen (TYLENOL) 325 mg tablet 2 tablets (650 mg total) by Per G Tube route every 6 (six) hours as needed for mild pain or headaches 30 tablet 0    amLODIPine (NORVASC) 5 mg tablet TAKE 1 TABLET(5 MG) VIA GTUBE DAILY 90 tablet 3    bisacodyl (DULCOLAX) 10 mg suppository Insert 10 mg into the rectum as needed for constipation      doxazosin (CARDURA) 2 mg tablet TAKE 1 TABLET(2 MG) VIA GTUBE DAILY 90 tablet 1    finasteride (PROSCAR) 5 mg tablet TAKE 1 TABLET(5 MG) BY MOUTH DAILY 90 tablet 1    folic acid (FOLVITE) 1 mg tablet 1 tablet (1 mg total) by Per PEG Tube route daily 90 tablet 1    insulin aspart (NovoLOG FlexPen) 100 UNIT/ML injection pen Inject according to sliding scale results every 6 hours 15 mL 0    Insulin Pen Needle (PEN NEEDLES 29GX1/2") 29G X 12MM MISC Use 4 (four) times a day 100 each 0    Lancets (accu-chek safe-t pro) lancets Use as instructed 100 each 0    lisinopril (ZESTRIL) 5 mg tablet TAKE 1 TABLET(5 MG) BY MOUTH DAILY 90 tablet 1    omeprazole (PriLOSEC) 10 mg delayed release capsule TAKE 1 CAPSULE(10 MG) BY MOUTH DAILY 90 capsule 3    ondansetron (Zofran ODT) 4 mg disintegrating tablet Take 1 tablet (4 mg total) by mouth daily as needed for nausea or vomiting 20 tablet 0     No current facility-administered medications for this visit  No Known Allergies    Review of Systems   Constitutional: Negative  HENT: Negative  Eyes: Negative  Respiratory: Negative  Cardiovascular: Negative  Gastrointestinal: Negative  Endocrine: Negative  Genitourinary: Negative  Musculoskeletal: Negative  Skin: Negative  Allergic/Immunologic: Negative  Neurological: Negative for seizures and headaches  Hematological: Negative  Psychiatric/Behavioral: Positive for confusion  Mood changes, and hallucinations       Video Exam    There were no vitals filed for this visit  Physical Exam  Pulmonary:      Effort: Pulmonary effort is normal  No respiratory distress  Neurological:      General: No focal deficit present  Mental Status: He is alert and oriented to person, place, and time  Psychiatric:         Mood and Affect: Mood normal          Behavior: Behavior normal          Thought Content: Thought content normal          Judgment: Judgment normal           I spent 20 minutes directly with the patient during this visit     Patient Instructions   Mood changes and hallucinations,   Yony Ordaz (daughter) [phone] LMW    Daughter noticed some changes and daughter is unsure what to do  Hallucinations last month and are intermittent but they continue at times  Patient has no fever, BS have been normal as per daughter  He is eating fine with regular appetite and BP is 119/61  Rec f-up with Neurology for hx of CAA and hx of hallucinations and confusion aty times   Check UA and labs  ER if any further hallucinations

## 2022-08-15 NOTE — PATIENT INSTRUCTIONS
Mood changes and hallucinations,   Jerman De La Cruz (daughter) [phone] LMW    Daughter noticed some changes and daughter is unsure what to do  Hallucinations last month and are intermittent but they continue at times  Patient has no fever, BS have been normal as per daughter  He is eating fine with regular appetite and BP is 119/61  Rec f-up with Neurology for hx of CAA and hx of hallucinations and confusion aty times  Check UA and labs  ER if any further hallucinations

## 2022-08-15 NOTE — ASSESSMENT & PLAN NOTE
PEG removed 7/14 in office  Silver nitrate to hypergranulation tissue on 8/1, comes back today for wound check  Minimal drainage per daughter, patient is tolerating a regular diet and having good bowel function      Plan:  Silver nitrate applied in office today  OK to leave open to air or can cover to protect clothing as needed  Follow-up PRN

## 2022-08-16 ENCOUNTER — TELEPHONE (OUTPATIENT)
Dept: LAB | Facility: HOSPITAL | Age: 82
End: 2022-08-16

## 2022-08-18 ENCOUNTER — APPOINTMENT (OUTPATIENT)
Dept: LAB | Facility: HOSPITAL | Age: 82
End: 2022-08-18
Attending: INTERNAL MEDICINE
Payer: COMMERCIAL

## 2022-08-18 DIAGNOSIS — I10 PRIMARY HYPERTENSION: ICD-10-CM

## 2022-08-18 DIAGNOSIS — R41.0 CONFUSION: ICD-10-CM

## 2022-08-18 DIAGNOSIS — R97.20 PSA ELEVATION: ICD-10-CM

## 2022-08-18 DIAGNOSIS — R44.3 HALLUCINATIONS: ICD-10-CM

## 2022-08-18 DIAGNOSIS — C61 PROSTATE CANCER (HCC): ICD-10-CM

## 2022-08-18 LAB
ALBUMIN SERPL BCP-MCNC: 3.2 G/DL (ref 3.5–5)
ALP SERPL-CCNC: 49 U/L (ref 46–116)
ALT SERPL W P-5'-P-CCNC: 13 U/L (ref 12–78)
ANION GAP SERPL CALCULATED.3IONS-SCNC: 5 MMOL/L (ref 4–13)
AST SERPL W P-5'-P-CCNC: 12 U/L (ref 5–45)
BASOPHILS # BLD AUTO: 0.03 THOUSANDS/ΜL (ref 0–0.1)
BASOPHILS NFR BLD AUTO: 0 % (ref 0–1)
BILIRUB SERPL-MCNC: 0.25 MG/DL (ref 0.2–1)
BUN SERPL-MCNC: 26 MG/DL (ref 5–25)
CALCIUM ALBUM COR SERPL-MCNC: 9.8 MG/DL (ref 8.3–10.1)
CALCIUM SERPL-MCNC: 9.2 MG/DL (ref 8.3–10.1)
CHLORIDE SERPL-SCNC: 106 MMOL/L (ref 96–108)
CO2 SERPL-SCNC: 28 MMOL/L (ref 21–32)
CREAT SERPL-MCNC: 0.71 MG/DL (ref 0.6–1.3)
EOSINOPHIL # BLD AUTO: 0.1 THOUSAND/ΜL (ref 0–0.61)
EOSINOPHIL NFR BLD AUTO: 1 % (ref 0–6)
ERYTHROCYTE [DISTWIDTH] IN BLOOD BY AUTOMATED COUNT: 13.2 % (ref 11.6–15.1)
GFR SERPL CREATININE-BSD FRML MDRD: 86 ML/MIN/1.73SQ M
GLUCOSE SERPL-MCNC: 94 MG/DL (ref 65–140)
HCT VFR BLD AUTO: 33.4 % (ref 36.5–49.3)
HGB BLD-MCNC: 10.4 G/DL (ref 12–17)
IMM GRANULOCYTES # BLD AUTO: 0.02 THOUSAND/UL (ref 0–0.2)
IMM GRANULOCYTES NFR BLD AUTO: 0 % (ref 0–2)
LYMPHOCYTES # BLD AUTO: 2.46 THOUSANDS/ΜL (ref 0.6–4.47)
LYMPHOCYTES NFR BLD AUTO: 31 % (ref 14–44)
MCH RBC QN AUTO: 31 PG (ref 26.8–34.3)
MCHC RBC AUTO-ENTMCNC: 31.1 G/DL (ref 31.4–37.4)
MCV RBC AUTO: 99 FL (ref 82–98)
MONOCYTES # BLD AUTO: 0.72 THOUSAND/ΜL (ref 0.17–1.22)
MONOCYTES NFR BLD AUTO: 9 % (ref 4–12)
NEUTROPHILS # BLD AUTO: 4.59 THOUSANDS/ΜL (ref 1.85–7.62)
NEUTS SEG NFR BLD AUTO: 59 % (ref 43–75)
NRBC BLD AUTO-RTO: 0 /100 WBCS
PLATELET # BLD AUTO: 258 THOUSANDS/UL (ref 149–390)
PMV BLD AUTO: 10.7 FL (ref 8.9–12.7)
POTASSIUM SERPL-SCNC: 4.2 MMOL/L (ref 3.5–5.3)
PROT SERPL-MCNC: 7.1 G/DL (ref 6.4–8.4)
PSA SERPL-MCNC: 17.7 NG/ML (ref 0–4)
RBC # BLD AUTO: 3.36 MILLION/UL (ref 3.88–5.62)
SODIUM SERPL-SCNC: 139 MMOL/L (ref 135–147)
TESTOST SERPL-MCNC: 13 NG/DL (ref 95–948)
WBC # BLD AUTO: 7.92 THOUSAND/UL (ref 4.31–10.16)

## 2022-08-18 PROCEDURE — 80053 COMPREHEN METABOLIC PANEL: CPT

## 2022-08-18 PROCEDURE — 36415 COLL VENOUS BLD VENIPUNCTURE: CPT

## 2022-08-18 PROCEDURE — 84403 ASSAY OF TOTAL TESTOSTERONE: CPT

## 2022-08-18 PROCEDURE — 84153 ASSAY OF PSA TOTAL: CPT

## 2022-08-18 PROCEDURE — 85025 COMPLETE CBC W/AUTO DIFF WBC: CPT

## 2022-08-19 ENCOUNTER — APPOINTMENT (OUTPATIENT)
Dept: LAB | Facility: HOSPITAL | Age: 82
End: 2022-08-19
Payer: COMMERCIAL

## 2022-08-19 DIAGNOSIS — R41.0 CONFUSION: ICD-10-CM

## 2022-08-19 DIAGNOSIS — R44.3 HALLUCINATIONS: ICD-10-CM

## 2022-08-19 LAB
BACTERIA UR QL AUTO: ABNORMAL /HPF
BILIRUB UR QL STRIP: NEGATIVE
CLARITY UR: ABNORMAL
COLOR UR: YELLOW
GLUCOSE UR STRIP-MCNC: NEGATIVE MG/DL
HGB UR QL STRIP.AUTO: NEGATIVE
KETONES UR STRIP-MCNC: NEGATIVE MG/DL
LEUKOCYTE ESTERASE UR QL STRIP: ABNORMAL
NITRITE UR QL STRIP: POSITIVE
NON-SQ EPI CELLS URNS QL MICRO: ABNORMAL /HPF
OTHER STN SPEC: ABNORMAL
PH UR STRIP.AUTO: 6 [PH]
PROT UR STRIP-MCNC: NEGATIVE MG/DL
RBC #/AREA URNS AUTO: ABNORMAL /HPF
SP GR UR STRIP.AUTO: 1.02 (ref 1–1.03)
UROBILINOGEN UR QL STRIP.AUTO: 0.2 E.U./DL
WBC #/AREA URNS AUTO: ABNORMAL /HPF

## 2022-08-19 PROCEDURE — 87077 CULTURE AEROBIC IDENTIFY: CPT

## 2022-08-19 PROCEDURE — 81001 URINALYSIS AUTO W/SCOPE: CPT

## 2022-08-19 PROCEDURE — 87186 SC STD MICRODIL/AGAR DIL: CPT

## 2022-08-19 PROCEDURE — 87086 URINE CULTURE/COLONY COUNT: CPT

## 2022-08-22 DIAGNOSIS — N39.0 URINARY TRACT INFECTION WITHOUT HEMATURIA, SITE UNSPECIFIED: Primary | ICD-10-CM

## 2022-08-22 LAB
BACTERIA UR CULT: ABNORMAL
BACTERIA UR CULT: ABNORMAL

## 2022-08-22 RX ORDER — NITROFURANTOIN 25; 75 MG/1; MG/1
100 CAPSULE ORAL 2 TIMES DAILY
Qty: 14 CAPSULE | Refills: 0 | Status: SHIPPED | OUTPATIENT
Start: 2022-08-22 | End: 2022-08-29

## 2022-08-29 ENCOUNTER — TELEPHONE (OUTPATIENT)
Dept: FAMILY MEDICINE CLINIC | Facility: CLINIC | Age: 82
End: 2022-08-29

## 2022-08-29 DIAGNOSIS — I69.391 DYSPHAGIA DUE TO OLD STROKE: ICD-10-CM

## 2022-08-29 DIAGNOSIS — R44.3 HALLUCINATIONS: Primary | ICD-10-CM

## 2022-08-29 DIAGNOSIS — R41.0 CONFUSION: ICD-10-CM

## 2022-08-29 NOTE — TELEPHONE ENCOUNTER
Patient's daughter called stating you were going to put in an order for patient to have Xrays done due to his confusion  Is patient to have this done there are no orders in the system  Please advise Jyoti Samuel at [phone], she has a company [employer] to come to her home to do the xray if needed  If needed please fax the order to [phone]

## 2022-08-29 NOTE — TELEPHONE ENCOUNTER
Vanessa Peoples from [address] [place] [address] the [employer] company  They need a physical symptom added to the chest x ray script  SOB or cough? I informed  nothing documented in call  They  Need a physical symptom added to script and revised script faxed back     Any questions [phone]  Fax [phone]

## 2022-08-30 DIAGNOSIS — R44.3 HALLUCINATIONS: ICD-10-CM

## 2022-08-30 DIAGNOSIS — R62.7 FAILURE TO THRIVE IN ADULT: ICD-10-CM

## 2022-08-30 DIAGNOSIS — R06.02 SOB (SHORTNESS OF BREATH): ICD-10-CM

## 2022-08-30 DIAGNOSIS — E85.4 CEREBRAL AMYLOID ANGIOPATHY (HCC): ICD-10-CM

## 2022-08-30 DIAGNOSIS — I69.391 DYSPHAGIA DUE TO OLD STROKE: ICD-10-CM

## 2022-08-30 DIAGNOSIS — I68.0 CEREBRAL AMYLOID ANGIOPATHY (HCC): ICD-10-CM

## 2022-08-30 DIAGNOSIS — I10 PRIMARY HYPERTENSION: ICD-10-CM

## 2022-08-30 DIAGNOSIS — K21.9 GASTROESOPHAGEAL REFLUX DISEASE, UNSPECIFIED WHETHER ESOPHAGITIS PRESENT: ICD-10-CM

## 2022-08-30 DIAGNOSIS — R41.0 CONFUSION: Primary | ICD-10-CM

## 2022-08-30 NOTE — TELEPHONE ENCOUNTER
Revised chest x ray script was faxed with cover sheet to [facility] to fax number [phone]

## 2022-09-01 DIAGNOSIS — R91.8 PULMONARY INFILTRATE IN RIGHT LUNG ON CHEST X-RAY: Primary | ICD-10-CM

## 2022-09-01 NOTE — TELEPHONE ENCOUNTER
I called Jeff Watsono pt's daughter consent ok she is aware and expressed verbal understanding  Pt's daughter is agreeable please place order from pulmonary consult  Pt's daughter is going to do some research, but what can cause this? Pt aware I ma leaving for the day  Please reply to clinical or clerical pool

## 2022-09-01 NOTE — TELEPHONE ENCOUNTER
Sonia faxed pt's x ray results to the office  The report has been placed in incoming form and fax bin to be placed for you to review

## 2022-09-08 ENCOUNTER — TELEPHONE (OUTPATIENT)
Dept: HEMATOLOGY ONCOLOGY | Facility: CLINIC | Age: 82
End: 2022-09-08

## 2022-09-08 NOTE — TELEPHONE ENCOUNTER
I spoke with patient's daughter Jyoti Samuel to find out if patient is receiving any treatment for prostate cancer because we have not seen him for a long time and PSA has increased to 17  They were aware of increase in PSA  She said she called our office to arrange a virtual visit but our office did not get back to her  She said they do not want any surgery or radiation or medication but might go with the hormone injection  She said she and her brother do not want to put him through cancer treatment because of his age and other comorbid conditions including stroke  She would discuss with patient's urologist about the possibility of hormone injection and if she needed my assistance or have virtual visit with me she will call back

## 2022-09-30 ENCOUNTER — CONSULT (OUTPATIENT)
Dept: PULMONOLOGY | Facility: CLINIC | Age: 82
End: 2022-09-30
Payer: COMMERCIAL

## 2022-09-30 VITALS
BODY MASS INDEX: 21.79 KG/M2 | HEIGHT: 63 IN | DIASTOLIC BLOOD PRESSURE: 80 MMHG | HEART RATE: 82 BPM | TEMPERATURE: 99.3 F | OXYGEN SATURATION: 94 % | SYSTOLIC BLOOD PRESSURE: 118 MMHG

## 2022-09-30 DIAGNOSIS — R91.8 PULMONARY INFILTRATE IN RIGHT LUNG ON CHEST X-RAY: ICD-10-CM

## 2022-09-30 PROCEDURE — 99204 OFFICE O/P NEW MOD 45 MIN: CPT | Performed by: INTERNAL MEDICINE

## 2022-09-30 NOTE — PROGRESS NOTES
Pulmonary consult Note   Everette Gotti 78 y o  male MRN: [id number]  9/30/2022    Assessment:  Abnormal chest x-ray   · Suspect bibasilar atelectasis/from deconditioning a weakness post stroke   · + pulmonary venous congestion, along with ERLINDA on exam, possibly contribution fromdCHF/AS last TTE showed grade 1 diastolic dysfunction, aortic stenosis/mild regurgitation  · Hx prostate cancer/not in treatment due to severe deconditioning/post hemorrhagic stroke ? Cannot exclude pulmonary Mets however would not pursue any treatment  Five no symptoms or signs of infection/pneumonia around the time he had the chest x-ray    Plan:   · Given incentive spirometer/supervised while using it given Education to the patient and his daughter   · Will start using it frequently Rc 1-2 hours at home   · Repeat chest x-ray in 2 months  · Check transthoracic TTE given the orthopnea/murmur on exam may need diuretic therapy for pulmonary congestion    Return in about 3 months (around 12/30/2022)  History of Present Illness     New consult for: abnormal CXR    Background:  78 y o  male with a h/o prostate cancer, HTN, ICH, CVA/residual dysphagia, protein calorie malnutrition/failure to thrive, peg tube/now reversed     Patient presented today accompanied by his daughter  He recently underwent a chest x-ray at home, per outside report/no imaging available:  Mild worsening at [facility] compared to 02/2022  No change of pulmonary venous congestion,, subsegmental atelectasis at LLL no pleural effusion  While chest x-ray in 02/2022 reported to have bibasilar mild atelectasis and very small pleural effusion  Patient is a lifelong nonsmoker, no prior history of lung disorders  He is mainly bed-bound since having hemorrhagic stroke in February  Needs assistance with  ADLs spent most of his time sitting in bed, with doing some physical therapy with his daughter    Reported to have orthopnea, more short of breath with laying flat this is noted since about 6 weeks  No cough, fever, chills, sputum production, wheezing, or chest congestion  Review of Systems  As per hpi, all other systems reviewed and were negative    Social/exposure history   Born and raised in [place] 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  Lifelong nonsmoker, nonalcoholic    Studies:    Imaging and other studies: I have personally reviewed pertinent films in PACS    Chest x-ray 02/17/2022-multiple bibasilar atelectasis, small pleural effusions  Pulmonary function testing:       EKG, Pathology, and Other Studies: I have personally reviewed pertinent reports  Past medical, surgical, social and family histories reviewed  Medications/Allergies: Reviewed      Vitals: Blood pressure 118/80, pulse 82, temperature 99 3 °F (37 4 °C), temperature source Tympanic, height 5' 3" (1 6 m), SpO2 94 %  Body mass index is 21 79 kg/m²  Oxygen Therapy  SpO2: 94 %  Oxygen Therapy: None (Room air)      Physical Exam  Body mass index is 21 79 kg/m²     Gen: not in acute distress, thin  Neck/Eyes: supple, no adenopathy, PERRL  Ear: normal appearance, no significant hearing impairment  Nose:  normal nasal mucosa, no drainage  Mouth:  unremarkable/normal appearance of lips, teeth and gums  Oropharynx: mucosa is moist, no focal lesions or erythema  Salivary glands: soft nontender  Chest: normal respiratory efforts, clear breath sounds bilaterally  CV: RRR, systolic ejection murmurs appreciated, no edema  Abdomen: soft, non tender  Extremities:  No observed deformity   Skin: unremarkable  Neuro: AAO X3, + dense left hemiplegia        Labs:  Lab Results   Component Value Date    WBC 7 92 08/18/2022    HGB 10 4 (L) 08/18/2022    HCT 33 4 (L) 08/18/2022    MCV 99 (H) 08/18/2022     08/18/2022     Lab Results Component Value Date    CALCIUM 9 2 08/18/2022    K 4 2 08/18/2022    CO2 28 08/18/2022     08/18/2022    BUN 26 (H) 08/18/2022    CREATININE 0 71 08/18/2022     No results found for: IGE  Lab Results   Component Value Date    ALT 13 08/18/2022    AST 12 08/18/2022    ALKPHOS 49 08/18/2022           Portions of the record may have been created with voice recognition software  Occasional wrong word or "sound a like" substitutions may have occurred due to the inherent limitations of voice recognition software  Read the chart carefully and recognize, using context, where substitutions have occurred    [facility]

## 2022-10-06 ENCOUNTER — TELEPHONE (OUTPATIENT)
Dept: HEMATOLOGY ONCOLOGY | Facility: CLINIC | Age: 82
End: 2022-10-06

## 2022-10-06 NOTE — TELEPHONE ENCOUNTER
Per Dr Fany Harding cancel treatment tomorrow, pt to be seen for f/u prior to starting any additional treatment  Dr Fany Harding will provide us with apt for patient after he reviewers his schedule  Spoke with daughter and notified her of cancellation  Reviewed with her Dr Fany Harding would like to see the patient in office to evaluate and discuss treatment prior to starting  Theresa Vaca reports transportation being difficult for the patient and is set up with [facility]  I will try and have our staff arrange treatment to be given same day of f/u apt to decrease patients travel  She was appreciative of this  Will cancel treatment and [facility] for tomorrows apt

## 2022-10-07 ENCOUNTER — HOSPITAL ENCOUNTER (OUTPATIENT)
Dept: INFUSION CENTER | Facility: CLINIC | Age: 82
Discharge: HOME | End: 2022-10-07

## 2022-10-07 ENCOUNTER — TELEPHONE (OUTPATIENT)
Dept: NEUROLOGY | Facility: CLINIC | Age: 82
End: 2022-10-07

## 2022-10-07 ENCOUNTER — TELEPHONE (OUTPATIENT)
Dept: HEMATOLOGY ONCOLOGY | Facility: CLINIC | Age: 82
End: 2022-10-07

## 2022-10-07 NOTE — TELEPHONE ENCOUNTER
Returned telephone call spoke with Dtr  Pt is now transported in a stretcher and is requesting a fu appt and Lupron appt at infusion on the same day  I will call her back after get this scheduled for the [place] office  Dtr does bring pt to appts

## 2022-10-07 NOTE — TELEPHONE ENCOUNTER
Miah Blunt, Please send this to the check out MR at either [facility] or [facility]   Thank you

## 2022-10-07 NOTE — TELEPHONE ENCOUNTER
Reviewed chart, no current orders  However, per 11/11/21 OV note, "Follow up with vascular neurology attending in 4 months or sooner if needed " Per chart review, pt was scheduled x2 with Kahlil Ospina and both appts were cancelled by les Martines, should pt be rescheduled with neurovascular attending? Or are you agreeable to seeing pt again? Any labs/imaging needed?

## 2022-10-07 NOTE — TELEPHONE ENCOUNTER
Ideally he should see attending  Was seen once via telemed nearly 1 year ago for HFU    No testing needed prior to appt

## 2022-10-07 NOTE — TELEPHONE ENCOUNTER
Hello,     I have called Kiana Rater the pt's daughter and she has scheduled the pt with Dolores Dylon for 1/19/23 and is asking if she can get a call back regarding test such as MRI etc if needed to be done prior to the schedule appt? Please contact her at [phone]      Thank you,     Rhea Andino

## 2022-10-07 NOTE — TELEPHONE ENCOUNTER
Pt's daughter called and would like to schedule an appt for pt with Brandon Offer  Can you please assist  Thank you  Transcribed VM:  Hi, this is Junnie Class calling for [employer]  I just wanted to give you a couple things  Give me a call back so that I could go ahead and possibly schedule an appointment with you  He hasn't been seen since last year or  And at this point of to be there's a referral from his primary doctor to be seen by loraine, or whomever, if someone could call me back so that I could come and make arrangements prior to going to your appointment  If he has to do any tests or anything like that, I would appreciate the call back [phone], in reference to patient keny's, and the [facility]  Last name is lubna and u  Renetta Perea and date of birth is [birth year]  Thank you

## 2022-10-07 NOTE — TELEPHONE ENCOUNTER
CALL RETURN FORM   Reason for patient call? Richa Voss is calling in on behalf of the patient requesting a call back in regards to the patient's follow up and upcoming treatment appt  She states she was advise that the office would  call when the two appointments were coordinated  Patient's primary oncologist?  Dr Jayshree Whiteside   Name of person the patient was calling for? Yuliana Almanzar   Any additional information to add, if applicable? Kiera's best call back number is [phone]   Informed patient that the message will be forwarded to the team and someone will get back to them as soon as possible    Did you relay this information to the patient?   Yes

## 2022-10-14 NOTE — TELEPHONE ENCOUNTER
Shantal Thornton,    I have spoken to the daughter and rescheduled with DR Mariangel Lang, Was able to get approval from Sherwin Azul to use a hold slot on 1/24/23 at 11:30 am at the [address]        Thank you,     Anne Meade

## 2022-10-18 ENCOUNTER — TELEPHONE (OUTPATIENT)
Dept: HEMATOLOGY ONCOLOGY | Facility: CLINIC | Age: 82
End: 2022-10-18

## 2022-10-18 DIAGNOSIS — C61 PROSTATE CANCER (HCC): Primary | ICD-10-CM

## 2022-10-18 NOTE — TELEPHONE ENCOUNTER
----- Message from Adal Faye RN sent at 10/18/2022  7:44 AM EDT -----  Can you please make sure this patient is scheduled for f/u apt so he can continue treatment    ----- Message -----  From: Adal Faye RN  Sent: 10/6/2022   3:12 PM EDT  To: Adal Faye RN    F/u apt and treatment

## 2022-10-18 NOTE — TELEPHONE ENCOUNTER
Caroline haji to help with follow up due to Dr Maryan Angeles wanting to patient to be seen same day as Lupron injection

## 2022-10-18 NOTE — TELEPHONE ENCOUNTER
Patient to be scheduled at the [facility] center for Lupron  Schedule appointment with Dr Pop Dubois on the same day  Preferable to have office appointment then lupron after  Thanks!

## 2022-10-19 ENCOUNTER — TELEPHONE (OUTPATIENT)
Dept: HEMATOLOGY ONCOLOGY | Facility: CLINIC | Age: 82
End: 2022-10-19

## 2022-10-19 NOTE — TELEPHONE ENCOUNTER
Left a detailed message with both appointments times and dates  Patient was also made aware schedule can be seen on mychart

## 2022-10-25 DIAGNOSIS — C61 PROSTATE CANCER (HCC): Primary | ICD-10-CM

## 2022-10-26 ENCOUNTER — TELEPHONE (OUTPATIENT)
Dept: HEMATOLOGY ONCOLOGY | Facility: CLINIC | Age: 82
End: 2022-10-26

## 2022-10-26 ENCOUNTER — HOSPITAL ENCOUNTER (OUTPATIENT)
Dept: INFUSION CENTER | Facility: CLINIC | Age: 82
Discharge: HOME | End: 2022-10-26
Payer: COMMERCIAL

## 2022-10-26 ENCOUNTER — OFFICE VISIT (OUTPATIENT)
Dept: HEMATOLOGY ONCOLOGY | Facility: CLINIC | Age: 82
End: 2022-10-26
Payer: COMMERCIAL

## 2022-10-26 VITALS
HEART RATE: 61 BPM | DIASTOLIC BLOOD PRESSURE: 64 MMHG | SYSTOLIC BLOOD PRESSURE: 126 MMHG | RESPIRATION RATE: 16 BRPM | TEMPERATURE: 98.6 F

## 2022-10-26 VITALS
TEMPERATURE: 99.5 F | RESPIRATION RATE: 18 BRPM | HEART RATE: 61 BPM | DIASTOLIC BLOOD PRESSURE: 78 MMHG | SYSTOLIC BLOOD PRESSURE: 131 MMHG

## 2022-10-26 DIAGNOSIS — C61 PROSTATE CANCER (HCC): Primary | ICD-10-CM

## 2022-10-26 PROCEDURE — 99214 OFFICE O/P EST MOD 30 MIN: CPT | Performed by: PHYSICIAN ASSISTANT

## 2022-10-26 RX ADMIN — LEUPROLIDE ACETATE 22.5 MG: KIT at 10:21

## 2022-10-26 NOTE — PROGRESS NOTES
Hematology/Oncology Outpatient Follow-up  Katheryne Kawasaki 78 y o  male 1940 [id number]    Date:  10/26/2022      Assessment and Plan:  1  Prostate cancer 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 home lab draw  Message sent to staff to arrange  Follow up in 3 months       - PSA Total, Diagnostic; Standing  - CBC and differential; Standing  - Comprehensive metabolic panel; Standing  - PSA Total, Diagnostic  - CBC and differential  - Comprehensive metabolic panel    HPI:  [id number] male presents for follow-up for prostate cancer      In 2010 patient was diagnosed with low risk Cathlamet score 6 prostate cancer in the [address] was given opportunity to be in treatment but due to low risk this was deferred      In February 2020 patient had no evidence of metastatic disease on chest abdomen pelvis imaging   There is thickening of bladder likely due to chronic bladder outlet syndrome due to obstruction from enlarged prostate  Maxi Bridegroom is also a large subcutaneous fluid cystic structure in the mid line lower back measuring 9 2 x 3 8 x 6 6 cm       Bone scan also completed February 2020 and was negative      MRI of the prostate completed in March 2020 showed very low likelihood of cancer present in the prostate      Patient received Lupron or Eligard every 3 months on below dates:    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  He developed subsequent left hemiplegia  He also had difficulty with speech  He required a gastric feeding tube  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 hypertension  Due to hemorrhagic stroke he has not been on antiplatelet or anticoagulant medication  His PEG tube was then removed in July 2022    Due to this episode patient had lapses of visits and Lupron injections  Patient was last seen in the office in February 2022  He had a Axium PET-CT which is specific to prostate cancer  This showed uptake in the large prostate mass compatible with tumor  There is no evidence of metastasis  Interval history:     ROS: Review of Systems   Constitutional: Negative for activity change, appetite change, chills, fatigue, fever and unexpected weight change  Respiratory: Positive for shortness of breath (mild, saw pulmonary, now doing incentive spirometry, seeing pulmonary )  Negative for cough  Cardiovascular: Negative for chest pain, palpitations and leg swelling  Gastrointestinal: Negative for abdominal pain, constipation, diarrhea, nausea and vomiting  Genitourinary: Negative for difficulty urinating, dysuria and hematuria  Musculoskeletal: Negative for arthralgias  Skin: Negative  Neurological: Negative for dizziness, weakness, light-headedness, numbness and headaches  Chronic left hemiplegia from CVA     Hematological: Negative  Psychiatric/Behavioral: Negative          Past Medical History:   Diagnosis Date   • Hypertension    • Prostate cancer [facility])     no surgery required       Past Surgical History:   Procedure Laterality Date   • GASTROSTOMY TUBE PLACEMENT N/A 9/30/2021    Procedure: INSERTION PEG TUBE; Surgeon: Misty Anderson DO;  Location: [facility];  Service: General   • HERNIA REPAIR         Social History     Socioeconomic History   • Marital status: Single     Spouse name: None   • Number of children: None   • Years of education: None   • Highest education level: None   Occupational History   • None   Tobacco Use   • Smoking status: Never Smoker   • Smokeless tobacco: Never Used   Vaping Use   • Vaping Use: Never used   Substance and Sexual Activity   • Alcohol use: Never   • Drug use: Never   • Sexual activity: Not Currently   Other Topics Concern   • None   Social History Narrative    Consumes 1 cup of coffee per day     Social Determinants of Health     Financial Resource Strain: Not on file   Food Insecurity: Not on file   Transportation Needs: Not on file   Physical Activity: Not on file   Stress: Not on file   Social Connections: Not on file   Intimate Partner Violence: Not on file   Housing Stability: Not on file       Family History   Problem Relation Age of Onset   • No Known Problems Mother    • No Known Problems Father        No Known Allergies      Current Outpatient Medications:   •  acetaminophen (TYLENOL) 325 mg tablet, 2 tablets (650 mg total) by Per G Tube route every 6 (six) hours as needed for mild pain or headaches, Disp: 30 tablet, Rfl: 0  •  amLODIPine (NORVASC) 5 mg tablet, TAKE 1 TABLET(5 MG) VIA GTUBE DAILY, Disp: 90 tablet, Rfl: 3  •  bisacodyl (DULCOLAX) 10 mg suppository, Insert 10 mg into the rectum as needed for constipation, Disp: , Rfl:   •  doxazosin (CARDURA) 2 mg tablet, TAKE 1 TABLET(2 MG) VIA GTUBE DAILY, Disp: 90 tablet, Rfl: 1  •  finasteride (PROSCAR) 5 mg tablet, TAKE 1 TABLET(5 MG) BY MOUTH DAILY, Disp: 90 tablet, Rfl: 1  •  folic acid (FOLVITE) 1 mg tablet, 1 tablet (1 mg total) by Per PEG Tube route daily (Patient not taking: Reported on 9/30/2022), Disp: 90 tablet, Rfl: 1  •  insulin aspart (NovoLOG FlexPen) 100 UNIT/ML injection pen, Inject according to sliding scale results every 6 hours, Disp: 15 mL, Rfl: 0  •  Insulin Pen Needle (PEN NEEDLES 29GX1/2") 29G X 12MM MISC, Use 4 (four) times a day, Disp: 100 each, Rfl: 0  •  Lancets (accu-chek safe-t pro) lancets, Use as instructed, Disp: 100 each, Rfl: 0  •  lisinopril (ZESTRIL) 5 mg tablet, TAKE 1 TABLET(5 MG) BY MOUTH DAILY, Disp: 90 tablet, Rfl: 1  •  omeprazole (PriLOSEC) 10 mg delayed release capsule, TAKE 1 CAPSULE(10 MG) BY MOUTH DAILY, Disp: 90 capsule, Rfl: 3  •  ondansetron (Zofran ODT) 4 mg disintegrating tablet, Take 1 tablet (4 mg total) by mouth daily as needed for nausea or vomiting, Disp: 20 tablet, Rfl: 0  No current facility-administered medications for this visit  Physical Exam:  /64 (BP Location: Left arm, Patient Position: Sitting, Cuff Size: Adult)   Pulse 61   Temp 98 6 °F (37 °C) (Temporal)   Resp 16     Physical Exam  Vitals reviewed  Constitutional:       General: He is not in acute distress  Appearance: He is well-developed  He is not ill-appearing  HENT:      Head: Normocephalic and atraumatic  Eyes:      General: No scleral icterus  Conjunctiva/sclera: Conjunctivae normal    Cardiovascular:      Rate and Rhythm: Normal rate and regular rhythm  Heart sounds: Normal heart sounds  No murmur heard  Pulmonary:      Effort: Pulmonary effort is normal  No respiratory distress  Breath sounds: Normal breath sounds  Abdominal:      Palpations: Abdomen is soft  Tenderness: There is no abdominal tenderness  Musculoskeletal:      Cervical back: Normal range of motion and neck supple  Right lower leg: No edema  Left lower leg: No edema  Comments: In stretcher    Lymphadenopathy:      Cervical: No cervical adenopathy  Skin:     General: Skin is warm and dry  Neurological:      Mental Status: He is alert and oriented to person, place, and time        Comments: Left hemiplegia    Psychiatric:         Mood and Affect: Mood normal  Behavior: Behavior normal        Labs:  Lab Results   Component Value Date    WBC 7 92 08/18/2022    HGB 10 4 (L) 08/18/2022    HCT 33 4 (L) 08/18/2022    MCV 99 (H) 08/18/2022     08/18/2022     Lab Results   Component Value Date    K 4 2 08/18/2022     08/18/2022    CO2 28 08/18/2022    BUN 26 (H) 08/18/2022    CREATININE 0 71 08/18/2022    GLUF 103 (H) 05/19/2020    CALCIUM 9 2 08/18/2022    CORRECTEDCA 9 8 08/18/2022    AST 12 08/18/2022    ALT 13 08/18/2022    ALKPHOS 49 08/18/2022    EGFR 86 08/18/2022     Patient voiced understanding and agreement in the above discussion  Aware to contact our office with questions/symptoms in the interim  This note has been generated by voice recognition software system  Therefore, there may be spelling, grammar, and or syntax errors  Please contact if questions arise

## 2022-10-26 NOTE — PLAN OF CARE
Problem: INFECTION - ADULT  Goal: Absence or prevention of progression during hospitalization  Description: INTERVENTIONS:  - Assess and monitor for signs and symptoms of infection  - Monitor lab/diagnostic results  - Monitor all insertion sites, i e  indwelling lines, tubes, and drains  - Monitor endotracheal if appropriate and nasal secretions for changes in amount and color  - [place] appropriate cooling/warming therapies per order  - Administer medications as ordered  - Instruct and encourage patient and family to use good hand hygiene technique  - Identify and instruct in appropriate isolation precautions for identified infection/condition  Outcome: Progressing

## 2022-10-26 NOTE — PROGRESS NOTES
Patient tolerated his Lupron injection in his right gluteus  Patient is aware of his next injection

## 2022-10-27 ENCOUNTER — TELEPHONE (OUTPATIENT)
Dept: HEMATOLOGY ONCOLOGY | Facility: CLINIC | Age: 82
End: 2022-10-27

## 2022-10-27 NOTE — TELEPHONE ENCOUNTER
Spoke with patient's daughter in regards to home phlebotomy  Daughter has the number and will call closer to the date the labs need to be drawn  Daughter, Greyson Martinez thanked me for the call

## 2022-10-31 ENCOUNTER — RA CDI HCC (OUTPATIENT)
Dept: OTHER | Facility: HOSPITAL | Age: 82
End: 2022-10-31

## 2022-10-31 NOTE — PROGRESS NOTES
[facility] 73  coding opportunities          Chart Reviewed number of suggestions sent to Provider: 1  [phone]     Patients Insurance     Medicare Insurance: [facility] Advantage

## 2022-11-06 DIAGNOSIS — Z79.4 TYPE 2 DIABETES MELLITUS WITHOUT COMPLICATION, WITH LONG-TERM CURRENT USE OF INSULIN (HCC): Primary | ICD-10-CM

## 2022-11-06 DIAGNOSIS — E11.9 TYPE 2 DIABETES MELLITUS WITHOUT COMPLICATION, WITH LONG-TERM CURRENT USE OF INSULIN (HCC): Primary | ICD-10-CM

## 2022-11-07 RX ORDER — BLOOD SUGAR DIAGNOSTIC
STRIP MISCELLANEOUS
Qty: 100 STRIP | Refills: 5 | Status: SHIPPED | OUTPATIENT
Start: 2022-11-07

## 2022-11-22 ENCOUNTER — OFFICE VISIT (OUTPATIENT)
Dept: FAMILY MEDICINE CLINIC | Facility: CLINIC | Age: 82
End: 2022-11-22

## 2022-11-22 VITALS
DIASTOLIC BLOOD PRESSURE: 84 MMHG | HEART RATE: 56 BPM | TEMPERATURE: 97.3 F | OXYGEN SATURATION: 98 % | SYSTOLIC BLOOD PRESSURE: 140 MMHG

## 2022-11-22 DIAGNOSIS — Z00.00 HEALTHCARE MAINTENANCE: ICD-10-CM

## 2022-11-22 DIAGNOSIS — Z00.00 MEDICARE ANNUAL WELLNESS VISIT, SUBSEQUENT: Primary | ICD-10-CM

## 2022-11-22 DIAGNOSIS — I61.9 CEREBRAL HEMORRHAGE (HCC): ICD-10-CM

## 2022-11-22 DIAGNOSIS — E44.0 MODERATE PROTEIN-CALORIE MALNUTRITION (HCC): ICD-10-CM

## 2022-11-22 DIAGNOSIS — C61 PROSTATE CANCER (HCC): ICD-10-CM

## 2022-11-22 DIAGNOSIS — I10 PRIMARY HYPERTENSION: ICD-10-CM

## 2022-11-22 NOTE — PATIENT INSTRUCTIONS
Medicare Preventive Visit Patient Instructions  Thank you for completing your Welcome to Medicare Visit or Medicare Annual Wellness Visit today  Your next wellness visit will be due in one year (11/23/2023)  The screening/preventive services that you may require over the next 5-10 years are detailed below  Some tests may not apply to you based off risk factors and/or age  Screening tests ordered at today's visit but not completed yet may show as past due  Also, please note that scanned in results may not display below  Preventive Screenings:  Service Recommendations Previous Testing/Comments   Colorectal Cancer Screening  Colonoscopy    Fecal Occult Blood Test (FOBT)/Fecal Immunochemical Test (FIT)  Fecal DNA/Cologuard Test  Flexible Sigmoidoscopy Age: 39-70 years old   Colonoscopy: every 10 years (May be performed more frequently if at higher risk)  OR  FOBT/FIT: every 1 year  OR  Cologuard: every 3 years  OR  Sigmoidoscopy: every 5 years  Screening may be recommended earlier than age 39 if at higher risk for colorectal cancer  Also, an individualized decision between you and your healthcare provider will decide whether screening between the ages of 74-80 would be appropriate  Colonoscopy: Not on file  FOBT/FIT: Not on file  Cologuard: Not on file  Sigmoidoscopy: Not on file          Prostate Cancer Screening Individualized decision between patient and health care provider in men between ages of 53-78   Medicare will cover every 12 months beginning on the day after your 50th birthday PSA: 17 7 ng/mL           Hepatitis C Screening Once for adults born between 1945 and 1965  More frequently in patients at high risk for Hepatitis C Hep C Antibody: Not on file        Diabetes Screening 1-2 times per year if you're at risk for diabetes or have pre-diabetes Fasting glucose: 103 mg/dL (5/19/2020)  A1C: 6 8 % (9/16/2021)      Cholesterol Screening Once every 5 years if you don't have a lipid disorder   May order more often based on risk factors  Lipid panel: 09/16/2021         Other Preventive Screenings Covered by Medicare:  Abdominal Aortic Aneurysm (AAA) Screening: covered once if your at risk  You're considered to be at risk if you have a family history of AAA or a male between the age of 73-68 who smoking at least 100 cigarettes in your lifetime  Lung Cancer Screening: covers low dose CT scan once per year if you meet all of the following conditions: (1) Age 50-69; (2) No signs or symptoms of lung cancer; (3) Current smoker or have quit smoking within the last 15 years; (4) You have a tobacco smoking history of at least 20 pack years (packs per day x number of years you smoked); (5) You get a written order from a healthcare provider  Glaucoma Screening: covered annually if you're considered high risk: (1) You have diabetes OR (2) Family history of glaucoma OR (3)  aged 48 and older OR (3)  American aged 72 and older  Osteoporosis Screening: covered every 2 years if you meet one of the following conditions: (1) Have a vertebral abnormality; (2) On glucocorticoid therapy for more than 3 months; (3) Have primary hyperparathyroidism; (4) On osteoporosis medications and need to assess response to drug therapy  HIV Screening: covered annually if you're between the age of 12-76  Also covered annually if you are younger than 13 and older than 72 with risk factors for HIV infection  For pregnant patients, it is covered up to 3 times per pregnancy      Immunizations:  Immunization Recommendations   Influenza Vaccine Annual influenza vaccination during flu season is recommended for all persons aged >= 6 months who do not have contraindications   Pneumococcal Vaccine   * Pneumococcal conjugate vaccine = PCV13 (Prevnar 13), PCV15 (Vaxneuvance), PCV20 (Prevnar 20)  * Pneumococcal polysaccharide vaccine = PPSV23 (Pneumovax) Adults 2364 years old: 1-3 doses may be recommended based on certain risk factors  Adults 72 years old: 1-2 doses may be recommended based off what pneumonia vaccine you previously received   Hepatitis B Vaccine 3 dose series if at intermediate or high risk (ex: diabetes, end stage renal disease, liver disease)   Tetanus (Td) Vaccine - COST NOT COVERED BY MEDICARE PART B Following completion of primary series, a booster dose should be given every 10 years to maintain immunity against tetanus  Td may also be given as tetanus wound prophylaxis  Tdap Vaccine - COST NOT COVERED BY MEDICARE PART B Recommended at least once for all adults  For pregnant patients, recommended with each pregnancy  Shingles Vaccine (Shingrix) - COST NOT COVERED BY MEDICARE PART B  2 shot series recommended in those aged 48 and above     Health Maintenance Due:  There are no preventive care reminders to display for this patient  Immunizations Due:      Topic Date Due    Hepatitis B Vaccine (1 of 3 - 3-dose series) Never done    COVID-19 Vaccine (1) Never done    Pneumococcal Vaccine: 65+ Years (1 - PCV) Never done    Influenza Vaccine (1) Never done     Advance Directives   What are advance directives? Advance directives are legal documents that state your wishes and plans for medical care  These plans are made ahead of time in case you lose your ability to make decisions for yourself  Advance directives can apply to any medical decision, such as the treatments you want, and if you want to donate organs  What are the types of advance directives? There are many types of advance directives, and each state has rules about how to use them  You may choose a combination of any of the following:  Living will: This is a written record of the treatment you want  You can also choose which treatments you do not want, which to limit, and which to stop at a certain time  This includes surgery, medicine, IV fluid, and tube feedings  Durable power of  for healthcare [facility]):   This is a written record that states who you want to make healthcare choices for you when you are unable to make them for yourself  This person, called a proxy, is usually a family member or a friend  You may choose more than 1 proxy  Do not resuscitate (DNR) order:  A DNR order is used in case your heart stops beating or you stop breathing  It is a request not to have certain forms of treatment, such as CPR  A DNR order may be included in other types of advance directives  Medical directive: This covers the care that you want if you are in a coma, near death, or unable to make decisions for yourself  You can list the treatments you want for each condition  Treatment may include pain medicine, surgery, blood transfusions, dialysis, IV or tube feedings, and a ventilator (breathing machine)  Values history: This document has questions about your views, beliefs, and how you feel and think about life  This information can help others choose the care that you would choose  Why are advance directives important? An advance directive helps you control your care  Although spoken wishes may be used, it is better to have your wishes written down  Spoken wishes can be misunderstood, or not followed  Treatments may be given even if you do not want them  An advance directive may make it easier for your family to make difficult choices about your care  © Copyright [employer] 2018 Information is for End User's use only and may not be sold, redistributed or otherwise used for commercial purposes  All illustrations and images included in CareNotes® are the copyrighted property of [employer]  or [employer] today and with daughter, lives in [facility]  And is in stretcher today and sees pulmonary and Heme/onc as directed  Peg Tube reversed  Patient is on Lupron  Home is safe and ADL's are helped by family and daughter  Patient with left sided hemiplegia  Swallow foods slowly  Hx of dysphagia  Reviewed Heme/Onc appt note:    Assessment and Plan:  1  Prostate cancer [facility])  78year old male presents for follow up for prostate cancer  Patient was last seen in Feb 2022  Patient had Shanna Hebert in Feb 2022 but then did not come back for office appts or infusion until today  As to be expected, PSA is rising 17 7  Previously was assessed on 9/2/21, 6 5  He had a stroke and now has chronic left hemiplegia and in stretcher  He lives with his daughter who takes all care of him  She states they do not want to pursue any surgery  Reviewed that PET/CT in Feb only showed active cancer in the prostate, no lymph node mets or bone mets  There was mention of concern of SOB with possible lung mets but would be rare with no lymph node mets or bone mets  SOB is somewhat better since doing incentive spirometry  Daughter would like to continue Lupron alone still for now, no additional oral medication at this time  He will have labs with [facility] home lab draw  Message sent to staff to arrange  Follow up in 3 months        - PSA Total, Diagnostic; Standing  - CBC and differential; Standing  - Comprehensive metabolic panel; Standing  - PSA Total, Diagnostic  - CBC and differential  - Comprehensive metabolic panel

## 2022-11-22 NOTE — PROGRESS NOTES
Chief Complaint   Patient presents with   • Medicare Wellness Visit     Pt's daughter  would like to discuss vitamins       Patient Instructions       Medicare Preventive Visit Patient Instructions  Thank you for completing your Welcome to Medicare Visit or Medicare Annual Wellness Visit today  Your next wellness visit will be due in one year (11/23/2023)  The screening/preventive services that you may require over the next 5-10 years are detailed below  Some tests may not apply to you based off risk factors and/or age  Screening tests ordered at today's visit but not completed yet may show as past due  Also, please note that scanned in results may not display below  Preventive Screenings:  Service Recommendations Previous Testing/Comments   Colorectal Cancer Screening  · Colonoscopy    · Fecal Occult Blood Test (FOBT)/Fecal Immunochemical Test (FIT)  · Fecal DNA/Cologuard Test  · Flexible Sigmoidoscopy Age: 39-70 years old   Colonoscopy: every 10 years (May be performed more frequently if at higher risk)  OR  FOBT/FIT: every 1 year  OR  Cologuard: every 3 years  OR  Sigmoidoscopy: every 5 years  Screening may be recommended earlier than age 39 if at higher risk for colorectal cancer  Also, an individualized decision between you and your healthcare provider will decide whether screening between the ages of 74-80 would be appropriate   Colonoscopy: Not on file  FOBT/FIT: Not on file  Cologuard: Not on file  Sigmoidoscopy: Not on file          Prostate Cancer Screening Individualized decision between patient and health care provider in men between ages of 53-78   Medicare will cover every 12 months beginning on the day after your 50th birthday PSA: 17 7 ng/mL           Hepatitis C Screening Once for adults born between [facility]  More frequently in patients at high risk for Hepatitis C Hep C Antibody: Not on file        Diabetes Screening 1-2 times per year if you're at risk for diabetes or have pre-diabetes Fasting glucose: 103 mg/dL (5/19/2020)  A1C: 6 8 % (9/16/2021)      Cholesterol Screening Once every 5 years if you don't have a lipid disorder  May order more often based on risk factors  Lipid panel: 09/16/2021         Other Preventive Screenings Covered by Medicare:  1  Abdominal Aortic Aneurysm (AAA) Screening: covered once if your at risk  You're considered to be at risk if you have a family history of AAA or a male between the age of 73-68 who smoking at least 100 cigarettes in your lifetime  2  Lung Cancer Screening: covers low dose CT scan once per year if you meet all of the following conditions: (1) Age 50-69; (2) No signs or symptoms of lung cancer; (3) Current smoker or have quit smoking within the last 15 years; (4) You have a tobacco smoking history of at least 20 pack years (packs per day x number of years you smoked); (5) You get a written order from a healthcare provider  3  Glaucoma Screening: covered annually if you're considered high risk: (1) You have diabetes OR (2) Family history of glaucoma OR (3)  aged 48 and older OR (3)  American aged 72 and older  3  Osteoporosis Screening: covered every 2 years if you meet one of the following conditions: (1) Have a vertebral abnormality; (2) On glucocorticoid therapy for more than 3 months; (3) Have primary hyperparathyroidism; (4) On osteoporosis medications and need to assess response to drug therapy  5  HIV Screening: covered annually if you're between the age of 12-76  Also covered annually if you are younger than 13 and older than 72 with risk factors for HIV infection  For pregnant patients, it is covered up to 3 times per pregnancy      Immunizations:  Immunization Recommendations   Influenza Vaccine Annual influenza vaccination during flu season is recommended for all persons aged >= 6 months who do not have contraindications   Pneumococcal Vaccine   * Pneumococcal conjugate vaccine = PCV13 (Prevnar 13), PCV15 (Vaxneuvance), PCV20 (Prevnar 20)  * Pneumococcal polysaccharide vaccine = PPSV23 (Pneumovax) Adults 2364 years old: 1-3 doses may be recommended based on certain risk factors  Adults 72 years old: 1-2 doses may be recommended based off what pneumonia vaccine you previously received   Hepatitis B Vaccine 3 dose series if at intermediate or high risk (ex: diabetes, end stage renal disease, liver disease)   Tetanus (Td) Vaccine - COST NOT COVERED BY MEDICARE PART B Following completion of primary series, a booster dose should be given every 10 years to maintain immunity against tetanus  Td may also be given as tetanus wound prophylaxis  Tdap Vaccine - COST NOT COVERED BY MEDICARE PART B Recommended at least once for all adults  For pregnant patients, recommended with each pregnancy  Shingles Vaccine (Shingrix) - COST NOT COVERED BY MEDICARE PART B  2 shot series recommended in those aged 48 and above     Health Maintenance Due:  There are no preventive care reminders to display for this patient  Immunizations Due:      Topic Date Due   • Hepatitis B Vaccine (1 of 3 - 3-dose series) Never done   • COVID-19 Vaccine (1) Never done   • Pneumococcal Vaccine: 65+ Years (1 - PCV) Never done   • Influenza Vaccine (1) Never done     Advance Directives   What are advance directives? Advance directives are legal documents that state your wishes and plans for medical care  These plans are made ahead of time in case you lose your ability to make decisions for yourself  Advance directives can apply to any medical decision, such as the treatments you want, and if you want to donate organs  What are the types of advance directives? There are many types of advance directives, and each state has rules about how to use them  You may choose a combination of any of the following:  · Living will: This is a written record of the treatment you want   You can also choose which treatments you do not want, which to limit, and which to stop at a certain time  This includes surgery, medicine, IV fluid, and tube feedings  · Durable power of  for healthcare [facility]): This is a written record that states who you want to make healthcare choices for you when you are unable to make them for yourself  This person, called a proxy, is usually a family member or a friend  You may choose more than 1 proxy  · Do not resuscitate (DNR) order:  A DNR order is used in case your heart stops beating or you stop breathing  It is a request not to have certain forms of treatment, such as CPR  A DNR order may be included in other types of advance directives  · Medical directive: This covers the care that you want if you are in a coma, near death, or unable to make decisions for yourself  You can list the treatments you want for each condition  Treatment may include pain medicine, surgery, blood transfusions, dialysis, IV or tube feedings, and a ventilator (breathing machine)  · Values history: This document has questions about your views, beliefs, and how you feel and think about life  This information can help others choose the care that you would choose  Why are advance directives important? An advance directive helps you control your care  Although spoken wishes may be used, it is better to have your wishes written down  Spoken wishes can be misunderstood, or not followed  Treatments may be given even if you do not want them  An advance directive may make it easier for your family to make difficult choices about your care  © Copyright [employer] 2018 Information is for End User's use only and may not be sold, redistributed or otherwise used for commercial purposes  All illustrations and images included in CareNotes® are the copyrighted property of [employer]  or [employer] today and with daughter, lives in apt  And is in stretcher today and sees pulmonary and Heme/onc as directed  Peg Tube reversed   Patient is on Lupron  Home is safe and ADL's are helped by family and daughter  Patient with left sided hemiplegia  Swallow foods slowly  Hx of dysphagia  Reviewed Heme/Onc appt note:    Assessment and Plan:  1  Prostate cancer [facility])  78year old male presents for follow up for prostate cancer  Patient was last seen in Feb 2022  Patient had Adi Adair in Feb 2022 but then did not come back for office appts or infusion until today  As to be expected, PSA is rising 17 7  Previously was assessed on 9/2/21, 6 5  He had a stroke and now has chronic left hemiplegia and in stretcher  He lives with his daughter who takes all care of him  She states they do not want to pursue any surgery  Reviewed that PET/CT in Feb only showed active cancer in the prostate, no lymph node mets or bone mets  There was mention of concern of SOB with possible lung mets but would be rare with no lymph node mets or bone mets  SOB is somewhat better since doing incentive spirometry  Daughter would like to continue Lupron alone still for now, no additional oral medication at this time  He will have labs with [facility] lab draw  Message sent to staff to arrange  Follow up in 3 months        - PSA Total, Diagnostic; Standing  - CBC and differential; Standing  - Comprehensive metabolic panel; Standing  - PSA Total, Diagnostic  - CBC and differential  - Comprehensive metabolic panel       Assessment and Plan:     Problem List Items Addressed This Visit        Cardiovascular and Mediastinum    Hypertension       Nervous and Auditory    Cerebral hemorrhage (HCC)       Genitourinary    Prostate cancer ([address] )       Other    Moderate protein-calorie malnutrition ([facility] [address] )   Other Visit Diagnoses     Medicare annual wellness visit, subsequent    -  Primary    Healthcare maintenance               Preventive health issues were discussed with patient, and age appropriate screening tests were ordered as noted in patient's After Visit Summary  Personalized health advice and appropriate referrals for health education or preventive services given if needed, as noted in patient's After Visit Summary  History of Present Illness:     Patient presents for a Medicare Wellness Visit    Medicare Wellness Visit (Pt's daughter  would like to discuss vitamins  ) Here with daughter  AWV today  PT stopped at home and daughter was given pamphlets for PT  Patient Care Team:  Myrtle Wooten DO as PCP - General (Family Medicine)     Review of Systems:     Review of Systems   Constitutional: Negative  HENT: Negative  Eyes: Negative  Respiratory: Negative  Cardiovascular: Negative  Gastrointestinal: Negative  Endocrine: Negative  Genitourinary: Negative  Musculoskeletal: Negative  Skin: Negative  Allergic/Immunologic: Negative  Neurological: Negative  Hematological: Negative  Psychiatric/Behavioral: Negative           Problem List:     Patient Active Problem List   Diagnosis   • Prostate cancer [facility])   • Lower urinary tract symptoms   • Hypertension   • Cerebral hemorrhage (HCC)   • Cerebral amyloid angiopathy (HCC)   • Toxic metabolic encephalopathy   • 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   • Elevated LFTs   • Moderate protein-calorie malnutrition (HCC)   • Renal cyst, right   • Leukocytosis   • SIRS (systemic inflammatory response syndrome) (HCC)   • Anemia   • Failure to thrive in adult   • History of cerebral hemorrhage   • PEG (percutaneous endoscopic gastrostomy) adjustment/replacement/removal (HCC)   • Pulmonary infiltrate in right lung on chest x-ray      Past Medical and Surgical History:     Past Medical History:   Diagnosis Date   • Hypertension    • Prostate cancer ([facility] [address] )     no surgery required     Past Surgical History:   Procedure Laterality Date   • GASTROSTOMY TUBE PLACEMENT N/A 9/30/2021    Procedure: INSERTION PEG TUBE;  Surgeon: Mindy Barone DO;  Location: [facility];  Service: General   • HERNIA REPAIR        Family History:     Family History   Problem Relation Age of Onset   • No Known Problems Mother    • No Known Problems Father       Social History:     Social History     Socioeconomic History   • Marital status: Single     Spouse name: None   • Number of children: None   • Years of education: None   • Highest education level: None   Occupational History   • None   Tobacco Use   • Smoking status: Never   • Smokeless tobacco: Never   Vaping Use   • Vaping Use: Never used   Substance and Sexual Activity   • Alcohol use: Never   • Drug use: Never   • Sexual activity: Not Currently   Other Topics Concern   • None   Social History Narrative    Consumes 1 cup of coffee per day     Social Determinants of Health     Financial Resource Strain: Not on file   Food Insecurity: Not on file   Transportation Needs: Not on file   Physical Activity: Not on file   Stress: Not on file   Social Connections: Not on file   Intimate Partner Violence: Not on file   Housing Stability: Not on file      Medications and Allergies:     Current Outpatient Medications   Medication Sig Dispense Refill   • Accu-Chek Guide test strip TEST ONCE DAILY AS NEEDED FOR HYPERGLYCEMIA 100 strip 5   • acetaminophen (TYLENOL) 325 mg tablet 2 tablets (650 mg total) by Per G Tube route every 6 (six) hours as needed for mild pain or headaches 30 tablet 0   • bisacodyl (DULCOLAX) 10 mg suppository Insert 10 mg into the rectum as needed for constipation     • doxazosin (CARDURA) 2 mg tablet TAKE 1 TABLET(2 MG) VIA GTUBE DAILY 90 tablet 1   • finasteride (PROSCAR) 5 mg tablet TAKE 1 TABLET(5 MG) BY MOUTH DAILY 90 tablet 1   • insulin aspart (NovoLOG FlexPen) 100 UNIT/ML injection pen Inject according to sliding scale results every 6 hours 15 mL 0   • Insulin Pen Needle (PEN NEEDLES 29GX1/2") 29G X 12MM MISC Use 4 (four) times a day 100 each 0   • Lancets (accu-chek safe-t pro) lancets Use as instructed 100 each 0   • lisinopril (ZESTRIL) 5 mg tablet TAKE 1 TABLET(5 MG) BY MOUTH DAILY 90 tablet 1   • omeprazole (PriLOSEC) 10 mg delayed release capsule TAKE 1 CAPSULE(10 MG) BY MOUTH DAILY 90 capsule 3   • ondansetron (Zofran ODT) 4 mg disintegrating tablet Take 1 tablet (4 mg total) by mouth daily as needed for nausea or vomiting 20 tablet 0   • amLODIPine (NORVASC) 5 mg tablet TAKE 1 TABLET(5 MG) VIA GTUBE DAILY (Patient not taking: Reported on 11/22/2022) 90 tablet 3   • folic acid (FOLVITE) 1 mg tablet 1 tablet (1 mg total) by Per PEG Tube route daily (Patient not taking: Reported on 9/30/2022) 90 tablet 1     No current facility-administered medications for this visit  No Known Allergies   Immunizations: There is no immunization history on file for this patient  Health Maintenance: There are no preventive care reminders to display for this patient  Topic Date Due   • Hepatitis B Vaccine (1 of 3 - 3-dose series) Never done   • COVID-19 Vaccine (1) Never done   • Pneumococcal Vaccine: 65+ Years (1 - PCV) Never done   • Influenza Vaccine (1) Never done      Medicare Screening Tests and Risk Assessments:     Branden Webb is here for his Subsequent Wellness visit  Health Risk Assessment:   Patient rates overall health as fair  Patient feels that their physical health rating is much better  Patient is satisfied with their life  Eyesight was rated as same  Hearing was rated as same  Patient feels that their emotional and mental health rating is slightly better  Patients states they are never, rarely angry  Patient states they are sometimes unusually tired/fatigued  Pain experienced in the last 7 days has been none  Patient states that he has experienced no weight loss or gain in last 6 months  Depression Screening:   PHQ-2 Score: 0      Fall Risk Screening: In the past year, patient has experienced: no history of falling in past year      Home Safety:  Patient has trouble with stairs inside or outside of their home   Patient has working smoke alarms and has working carbon monoxide detector  Home safety hazards include: none  Medications:   Patient is not currently taking any over-the-counter supplements  Patient is not able to manage medications  Activities of Daily Living (ADLs)/Instrumental Activities of Daily Living (IADLs):   Walk and transfer into and out of bed and chair?: No  Dress and groom yourself?: No    Bathe or shower yourself?: No    Feed yourself? No  Do your laundry/housekeeping?: No  Manage your money, pay your bills and track your expenses?: No  Make your own meals?: No    Do your own shopping?: No    ADL comments: Lives with family who helps with ADL's    Previous Hospitalizations:   Any hospitalizations or ED visits within the last 12 months?: Yes    How many hospitalizations have you had in the last year?: 1-2    Advance Care Planning:   Living will: No    Durable POA for healthcare: No    Advanced directive: No      Comments: Has yet to be discussed with family    PREVENTIVE SCREENINGS      Cardiovascular Screening:    General: Screening Current      Diabetes Screening:     General: Screening Not Indicated and History Diabetes      Prostate Cancer Screening:    General: History Prostate Cancer and Screening Not Indicated      Lung Cancer Screening:     General: Screening Not Indicated    Screening, Brief Intervention, and Referral to Treatment (SBIRT)    Screening  Typical number of drinks in a day: 0  Typical number of drinks in a week: 0  Interpretation: Low risk drinking behavior  Single Item Drug Screening:  How often have you used an illegal drug (including marijuana) or a prescription medication for non-medical reasons in the past year? never    Single Item Drug Screen Score: 0  Interpretation: Negative screen for possible drug use disorder    No results found       Physical Exam:     /84 (BP Location: Right arm, Patient Position: Sitting, Cuff Size: Adult)   Pulse 56   Temp (!) 97 3 °F (36 3 °C) (Temporal)   SpO2 98%     Physical Exam  HENT:      Head: Normocephalic and atraumatic  Right Ear: Tympanic membrane normal       Left Ear: Tympanic membrane normal       Nose: Nose normal       Mouth/Throat:      Mouth: Mucous membranes are moist    Eyes:      Extraocular Movements: Extraocular movements intact  Conjunctiva/sclera: Conjunctivae normal       Pupils: Pupils are equal, round, and reactive to light  Cardiovascular:      Rate and Rhythm: Normal rate  Pulses: Normal pulses  Pulmonary:      Effort: Pulmonary effort is normal       Breath sounds: Normal breath sounds  Abdominal:      General: Abdomen is flat  Bowel sounds are normal       Palpations: Abdomen is soft  Musculoskeletal:      Cervical back: Normal range of motion  Skin:     General: Skin is warm  Capillary Refill: Capillary refill takes less than 2 seconds  Neurological:      General: No focal deficit present  Mental Status: He is alert and oriented to person, place, and time  Psychiatric:         Mood and Affect: Mood normal          Behavior: Behavior normal          Thought Content:  Thought content normal          Judgment: Judgment normal           Edie Harkins DO

## 2022-12-25 DIAGNOSIS — C61 PROSTATE CANCER (HCC): ICD-10-CM

## 2022-12-25 DIAGNOSIS — R39.9 LOWER URINARY TRACT SYMPTOMS: ICD-10-CM

## 2022-12-27 RX ORDER — DOXAZOSIN 2 MG/1
TABLET ORAL
Qty: 90 TABLET | Refills: 1 | Status: SHIPPED | OUTPATIENT
Start: 2022-12-27

## 2022-12-27 RX ORDER — FINASTERIDE 5 MG/1
TABLET, FILM COATED ORAL
Qty: 90 TABLET | Refills: 1 | Status: SHIPPED | OUTPATIENT
Start: 2022-12-27

## 2023-01-05 ENCOUNTER — OFFICE VISIT (OUTPATIENT)
Dept: NEUROLOGY | Facility: CLINIC | Age: 83
End: 2023-01-05

## 2023-01-05 VITALS — HEART RATE: 65 BPM | DIASTOLIC BLOOD PRESSURE: 66 MMHG | SYSTOLIC BLOOD PRESSURE: 142 MMHG | TEMPERATURE: 97.6 F

## 2023-01-05 DIAGNOSIS — Z86.73 HISTORY OF STROKE: Primary | ICD-10-CM

## 2023-01-05 DIAGNOSIS — E85.4 CEREBRAL AMYLOID ANGIOPATHY (HCC): ICD-10-CM

## 2023-01-05 DIAGNOSIS — I68.0 CEREBRAL AMYLOID ANGIOPATHY (HCC): ICD-10-CM

## 2023-01-05 NOTE — ASSESSMENT & PLAN NOTE
- Not currently on statin, history of elevated LFTs on statin   - No up to date lipid panel, repeat ordered today    - Discussed things that we know are good for memory  Advised daughter to monitor progression of memory and we will reevaluate at follow-up  - He can have some trouble with sleep at times  Daughter reports he was awake for almost 22 hours and one time, and will take some naps during the day that will keep him up at night  Discouraged frequent/log naps during the day  This may be a contributing factor to his memory and hallucinations  Reports that melatonin in the past was not helpful and made him drowsy during the day  Discussed importance of maintaining a proper sleep/wake cycle  - Advised against NSAIDs such as ibuprofen and naproxen for pain

## 2023-01-05 NOTE — PATIENT INSTRUCTIONS
For ongoing stroke prevention   - Recommend to check blood pressure occasionally away from the doctor's office to make sure that those numbers are typically less than 130/80  If they are frequently higher than that, we recommend checking a little more often and to follow up with primary care team   - Will defer to primary care team for monitoring of cholesterol panel and blood sugar numbers with target LDL cholesterol of less than 70 and hemoglobin A1c less than 7%  - Recommend following a low salt, mediterranean diet   - Recommend routine physical exercise as tolerated     We will plan for him to return to the office in 6 months but would be happy to see him sooner if the need should arise  If he has any symptoms concerning for TIA or stroke including sudden painless loss of vision or double vision, difficulty speaking or swallowing, vertigo/room spinning that does not quickly resolve, or weakness/numbness/loss of coordination affecting 1 side of the face or body he should proceed by ambulance to the nearest emergency room immediately

## 2023-01-05 NOTE — PROGRESS NOTES
Review of Systems   Constitutional: Negative  Negative for appetite change and fever  HENT: Negative  Negative for hearing loss, tinnitus, trouble swallowing and voice change  Eyes: Negative  Negative for photophobia, pain and visual disturbance  Respiratory: Negative  Negative for shortness of breath  Cardiovascular: Negative  Negative for palpitations  Gastrointestinal: Negative  Negative for nausea and vomiting  Endocrine: Negative  Negative for cold intolerance  Genitourinary: Negative  Negative for dysuria, frequency and urgency  Musculoskeletal: Positive for gait problem (unable to walk)  Negative for myalgias and neck pain  Skin: Negative  Negative for rash  Allergic/Immunologic: Negative  Neurological: Positive for tremors (left facial tremor occasionally), speech difficulty (repetitive speech) and weakness (left side residual )  Negative for dizziness, seizures, syncope, facial asymmetry, light-headedness, numbness and headaches  Hematological: Negative  Does not bruise/bleed easily  Psychiatric/Behavioral: Positive for confusion and hallucinations  Negative for sleep disturbance

## 2023-01-05 NOTE — PROGRESS NOTES
Patient ID: Bob Ornelas is a 78 y o  male  who presents for follow-up evaluation with regard to his prior stroke (2021)  History of ICH thought to be likely due to CAA and HTN  Residual deficits include left side flaccid  Assessment/Plan:    Cerebral amyloid angiopathy (HCC)  - Continue off antithrombotics  History of stroke  - Not currently on statin, history of elevated LFTs on statin   - No up to date lipid panel, repeat ordered today    - Discussed things that we know are good for memory  Advised daughter to monitor progression of memory and we will reevaluate at follow-up  - He can have some trouble with sleep at times  Daughter reports he was awake for almost 22 hours and one time, and will take some naps during the day that will keep him up at night  Discouraged frequent/log naps during the day  This may be a contributing factor to his memory and hallucinations  Reports that melatonin in the past was not helpful and made him drowsy during the day  Discussed importance of maintaining a proper sleep/wake cycle  - Advised against NSAIDs such as ibuprofen and naproxen for pain  Plan discussed with patient today:  Patient Instructions   For ongoing stroke prevention   - Recommend to check blood pressure occasionally away from the doctor's office to make sure that those numbers are typically less than 130/80  If they are frequently higher than that, we recommend checking a little more often and to follow up with primary care team   - Will defer to primary care team for monitoring of cholesterol panel and blood sugar numbers with target LDL cholesterol of less than 70 and hemoglobin A1c less than 7%  - Recommend following a low salt, mediterranean diet   - Recommend routine physical exercise as tolerated     We will plan for him to return to the office in 6 months but would be happy to see him sooner if the need should arise    If he has any symptoms concerning for TIA or stroke including sudden painless loss of vision or double vision, difficulty speaking or swallowing, vertigo/room spinning that does not quickly resolve, or weakness/numbness/loss of coordination affecting 1 side of the face or body he should proceed by ambulance to the nearest emergency room immediately  Subjective:    HPI    He is living at home with his daughter  His left side is flaccid and he is primarily wheelchair bound at this point  Since his last visit about a year ago, he does not report any new stroke symptoms  He is able to communicate clearly, but has been having some confusion and hallucinations at times, but for the most part he remebers and can carry a conversation, there are very few moments where his conversation is not appropriate to context but can be redirected  No longer has a PEG tube and is eating well  Considering the situation, his mood and behavior is overall pretty good  Every now and then he will have some sentimental moments but not an every day thing  He has been having some hallucinations and daughter notices that it will last a few days and will see something on the tv but he thinks there is someone there  He will go through periods where he wont sleep either and he is more talkative  Hallucinations are occurring around the same time of lack of sleep  Daughter noticed back around October about two very brief episodes of word finding difficulty with some twitching but has not had an episode since  Discussed with her to please key an eye on this and we will monitor for now  He will have some pain from time to time in the left side  He is able to manage conservatively and will only use Tylenol when he really need its  Daughter does check his blood pressure at home with readings usually below 120  His history was also obtained from his daughter, who was present at today's visit       The following portions of the patient's history were reviewed and updated as appropriate: allergies, current medications, past family history, past medical history, past social history, past surgical history and problem list      Objective:    Blood pressure 142/66, pulse 65, temperature 97 6 °F (36 4 °C), temperature source Temporal     Lab Results   Component Value Date/Time    CHOLESTEROL 218 (H) 09/16/2021 05:35 AM     Lab Results   Component Value Date/Time    TRIG 59 09/16/2021 05:35 AM     Lab Results   Component Value Date/Time    HDL 61 09/16/2021 05:35 AM     Lab Results   Component Value Date/Time    LDLCALC 145 (H) 09/16/2021 05:35 AM       Lab Results   Component Value Date/Time    HGBA1C 6 8 (H) 09/16/2021 05:35 AM     Lab Results   Component Value Date/Time     09/16/2021 05:35 AM     Neurological Exam  Awake, alert, oriented, and in no apparent distress  Mood is appropriate to situation  Speech is fluent with no dysarthria or aphasia  Cranial nerves 2-12 were symmetrically intact bilaterally, with the exception of no shoulder shrug on the left side  Strength 5/5 in the right upper extremity, 4/5 in the right lower extremity, 0/5 in the left upper and lower extremity with rigidity  Sensation is intact to light touch, vibration, and temperature in the bilateral upper and lower extremities  DTRs are symmetric and intact bilaterally  He is in a stretcher  Review of Systems  Constitutional: Negative  Negative for appetite change and fever  HENT: Negative  Negative for hearing loss, tinnitus, trouble swallowing and voice change  Eyes: Negative  Negative for photophobia, pain and visual disturbance  Respiratory: Negative  Negative for shortness of breath  Cardiovascular: Negative  Negative for palpitations  Gastrointestinal: Negative  Negative for nausea and vomiting  Endocrine: Negative  Negative for cold intolerance  Genitourinary: Negative  Negative for dysuria, frequency and urgency  Musculoskeletal: Positive for gait problem (unable to walk)   Negative for myalgias and neck pain  Skin: Negative  Negative for rash  Allergic/Immunologic: Negative  Neurological: Positive for tremors (left facial tremor occasionally), speech difficulty (repetitive speech) and weakness (left side residual )  Negative for dizziness, seizures, syncope, facial asymmetry, light-headedness, numbness and headaches  Hematological: Negative  Does not bruise/bleed easily  Psychiatric/Behavioral: Positive for confusion and hallucinations  Negative for sleep disturbance  I personally reviewed the ROS that was entered by the medical assistant  I have spent 30 minutes with Patient  today in which greater than 50% of this time was spent in counseling/coordination of care regarding Diagnostic results, Prognosis, Risks and benefits of tx options, Intructions for management, Patient and family education, Importance of tx compliance, Risk factor reductions, Impressions and Plan of care as above

## 2023-01-12 ENCOUNTER — APPOINTMENT (OUTPATIENT)
Dept: LAB | Facility: HOSPITAL | Age: 83
End: 2023-01-12

## 2023-01-12 ENCOUNTER — HOSPITAL ENCOUNTER (OUTPATIENT)
Dept: NON INVASIVE DIAGNOSTICS | Facility: HOSPITAL | Age: 83
Discharge: HOME | End: 2023-01-12
Attending: INTERNAL MEDICINE

## 2023-01-12 VITALS
HEART RATE: 67 BPM | SYSTOLIC BLOOD PRESSURE: 142 MMHG | BODY MASS INDEX: 21.79 KG/M2 | WEIGHT: 123 LBS | DIASTOLIC BLOOD PRESSURE: 66 MMHG | HEIGHT: 63 IN

## 2023-01-12 DIAGNOSIS — Z86.73 HISTORY OF STROKE: ICD-10-CM

## 2023-01-12 DIAGNOSIS — R91.8 PULMONARY INFILTRATE IN RIGHT LUNG ON CHEST X-RAY: ICD-10-CM

## 2023-01-12 LAB
ALBUMIN SERPL BCP-MCNC: 3.2 G/DL (ref 3.5–5)
ALP SERPL-CCNC: 64 U/L (ref 46–116)
ALT SERPL W P-5'-P-CCNC: 15 U/L (ref 12–78)
AMMONIA PLAS-SCNC: 20 UMOL/L (ref 11–35)
ANION GAP SERPL CALCULATED.3IONS-SCNC: 11 MMOL/L (ref 4–13)
AORTIC ROOT: 4 CM
AORTIC VALVE MEAN VELOCITY: 20.7 M/S
APICAL FOUR CHAMBER EJECTION FRACTION: 87 %
AST SERPL W P-5'-P-CCNC: 13 U/L (ref 5–45)
AV AREA BY CONTINUOUS VTI: 1.9 CM2
AV AREA PEAK VELOCITY: 1.8 CM2
AV LVOT MEAN GRADIENT: 5 MMHG
AV LVOT PEAK GRADIENT: 10 MMHG
AV MEAN GRADIENT: 19 MMHG
AV PEAK GRADIENT: 31 MMHG
BASOPHILS # BLD AUTO: 0.04 THOUSANDS/ÂΜL (ref 0–0.1)
BASOPHILS NFR BLD AUTO: 1 % (ref 0–1)
BILIRUB SERPL-MCNC: 0.34 MG/DL (ref 0.2–1)
BUN SERPL-MCNC: 23 MG/DL (ref 5–25)
CALCIUM ALBUM COR SERPL-MCNC: 9.8 MG/DL (ref 8.3–10.1)
CALCIUM SERPL-MCNC: 9.2 MG/DL (ref 8.3–10.1)
CHLORIDE SERPL-SCNC: 102 MMOL/L (ref 96–108)
CHOLEST SERPL-MCNC: 196 MG/DL
CO2 SERPL-SCNC: 26 MMOL/L (ref 21–32)
CREAT SERPL-MCNC: 0.79 MG/DL (ref 0.6–1.3)
DOP CALC AO PEAK VEL: 2.8 M/S
DOP CALC AO VTI: 42.86 CM
DOP CALC LVOT DIAMETER: 2 CM
DOP CALC LVOT PEAK VEL VTI: 25.62 CM
DOP CALC LVOT PEAK VEL: 1.58 M/S
DOP CALC LVOT STROKE INDEX: 53.5 ML/M2
DOP CALC LVOT STROKE VOLUME: 84
E WAVE DECELERATION TIME: 342 MS
E/A RATIO: 0.53
EOSINOPHIL # BLD AUTO: 0.09 THOUSAND/ÂΜL (ref 0–0.61)
EOSINOPHIL NFR BLD AUTO: 1 % (ref 0–6)
ERYTHROCYTE [DISTWIDTH] IN BLOOD BY AUTOMATED COUNT: 13.2 % (ref 11.6–15.1)
FRACTIONAL SHORTENING: 42 (ref 28–44)
GFR SERPL CREATININE-BSD FRML MDRD: 82 ML/MIN/1.73SQ M
GLUCOSE SERPL-MCNC: 188 MG/DL (ref 65–140)
HCT VFR BLD AUTO: 35.4 % (ref 36.5–49.3)
HDLC SERPL-MCNC: 57 MG/DL
HGB BLD-MCNC: 11.5 G/DL (ref 12–17)
IMM GRANULOCYTES # BLD AUTO: 0.03 THOUSAND/UL (ref 0–0.2)
IMM GRANULOCYTES NFR BLD AUTO: 0 % (ref 0–2)
INTERVENTRICULAR SEPTUM IN DIASTOLE (PARASTERNAL SHORT AXIS VIEW): 1.3 CM
INTERVENTRICULAR SEPTUM: 1.3 CM (ref 0.6–1.1)
LAAS-AP4: 29.1 CM2
LDLC SERPL CALC-MCNC: 108 MG/DL (ref 0–100)
LEFT ATRIUM AREA SYSTOLE SINGLE PLANE A4C: 27.2 CM2
LEFT INTERNAL DIMENSION IN SYSTOLE: 2.2 CM (ref 2.1–4)
LEFT VENTRICULAR INTERNAL DIMENSION IN DIASTOLE: 3.8 CM (ref 3.5–6)
LEFT VENTRICULAR POSTERIOR WALL IN END DIASTOLE: 1.2 CM
LEFT VENTRICULAR STROKE VOLUME: 45 ML
LVSV (TEICH): 45 ML
LYMPHOCYTES # BLD AUTO: 2.59 THOUSANDS/ÂΜL (ref 0.6–4.47)
LYMPHOCYTES NFR BLD AUTO: 34 % (ref 14–44)
MCH RBC QN AUTO: 31.3 PG (ref 26.8–34.3)
MCHC RBC AUTO-ENTMCNC: 32.5 G/DL (ref 31.4–37.4)
MCV RBC AUTO: 96 FL (ref 82–98)
MONOCYTES # BLD AUTO: 0.66 THOUSAND/ÂΜL (ref 0.17–1.22)
MONOCYTES NFR BLD AUTO: 9 % (ref 4–12)
MV E'TISSUE VEL-LAT: 9 CM/S
MV E'TISSUE VEL-SEP: 7 CM/S
MV PEAK A VEL: 1.2 M/S
MV PEAK E VEL: 64 CM/S
MV STENOSIS PRESSURE HALF TIME: 99 MS
MV VALVE AREA P 1/2 METHOD: 2.2
NEUTROPHILS # BLD AUTO: 4.17 THOUSANDS/ÂΜL (ref 1.85–7.62)
NEUTS SEG NFR BLD AUTO: 55 % (ref 43–75)
NONHDLC SERPL-MCNC: 139 MG/DL
NRBC BLD AUTO-RTO: 0 /100 WBCS
PLATELET # BLD AUTO: 248 THOUSANDS/UL (ref 149–390)
PMV BLD AUTO: 10.1 FL (ref 8.9–12.7)
POTASSIUM SERPL-SCNC: 4 MMOL/L (ref 3.5–5.3)
PROT SERPL-MCNC: 7.4 G/DL (ref 6.4–8.4)
PSA SERPL-MCNC: 41.7 NG/ML (ref 0–4)
RBC # BLD AUTO: 3.68 MILLION/UL (ref 3.88–5.62)
RIGHT VENTRICLE ID DIMENSION: 4.4 CM
SL CV LV EF: 75
SL CV PED ECHO LEFT VENTRICLE DIASTOLIC VOLUME (MOD BIPLANE) 2D: 60 ML
SL CV PED ECHO LEFT VENTRICLE SYSTOLIC VOLUME (MOD BIPLANE) 2D: 16 ML
SODIUM SERPL-SCNC: 139 MMOL/L (ref 135–147)
TRICUSPID ANNULAR PLANE SYSTOLIC EXCURSION: 2.4 CM
TRIGL SERPL-MCNC: 155 MG/DL
WBC # BLD AUTO: 7.58 THOUSAND/UL (ref 4.31–10.16)

## 2023-01-17 ENCOUNTER — TELEPHONE (OUTPATIENT)
Dept: HEMATOLOGY ONCOLOGY | Facility: CLINIC | Age: 83
End: 2023-01-17

## 2023-01-17 ENCOUNTER — OFFICE VISIT (OUTPATIENT)
Dept: HEMATOLOGY ONCOLOGY | Facility: CLINIC | Age: 83
End: 2023-01-17

## 2023-01-17 ENCOUNTER — HOSPITAL ENCOUNTER (OUTPATIENT)
Dept: INFUSION CENTER | Facility: CLINIC | Age: 83
Discharge: HOME | End: 2023-01-17

## 2023-01-17 VITALS
SYSTOLIC BLOOD PRESSURE: 140 MMHG | RESPIRATION RATE: 16 BRPM | TEMPERATURE: 98.4 F | DIASTOLIC BLOOD PRESSURE: 74 MMHG | HEART RATE: 72 BPM | OXYGEN SATURATION: 97 % | BODY MASS INDEX: 21.79 KG/M2 | HEIGHT: 63 IN

## 2023-01-17 VITALS — TEMPERATURE: 99.4 F

## 2023-01-17 DIAGNOSIS — C61 PROSTATE CANCER (HCC): Primary | ICD-10-CM

## 2023-01-17 RX ADMIN — LEUPROLIDE ACETATE 22.5 MG: KIT at 09:19

## 2023-01-17 NOTE — PROGRESS NOTES
Pt tolerated lupron injection to left gluteal area without incident  Declined AVS, daughter aware of future appts

## 2023-01-17 NOTE — TELEPHONE ENCOUNTER
Patient has treatment scheduled for 4/11 at infusion, but could we switch it to 4/18 to align with appointment with Dr Shala Cadena on that day? Thanks!

## 2023-01-17 NOTE — TELEPHONE ENCOUNTER
Patient has treatment scheduled for 4/11 at infusion, but could we switch it to 4/18 to align with appointment with Dr Luis Hanson on that day? Thanks! If possible, could you notify patient of this change as well as appointment with Dr Luis Hanson? Thanks!

## 2023-01-17 NOTE — PROGRESS NOTES
Hematology/Oncology Outpatient Follow-up  Abdi Drew 78 y o  male 1940 [id number]    Date:  1/17/2023      Assessment and Plan:  1  Prostate cancer [facility])  26-year-old male presents for follow-up visit for prostate cancer  He is on Lupron alone  Previously had discussed addition of Xtandi or Ericka Gentleman however daughter wished to discontinue Lupron alone  PSA is increasing significantly  PSA is 41 7, previously 17 7 in August 2022  Reviewed options with patient and daughter  Patient is ECOG status for, bedbound secondary to previous CVA  She states that he is now getting his days and nights confused  Specifically he did not sleep well last night and is more confused than normal   We reviewed neck steps to be imaging with a PSMA PET/CT to be preferred  We reviewed Xtandi or Ericka Gentleman  Printed information was given to them  We also reviewed hospice  Reviewed to the need for to discuss goals of care with patient, daughter and other siblings  She will review with her brothers all the above options and let us know how they would like to proceed  He will proceed with Lupron today  Follow-up in 3 months      HPI:  26-year-old male presents for follow-up for prostate cancer      In 2010 patient was diagnosed with low risk [place] score 6 prostate cancer in the [address] was given opportunity to be in treatment but due to low risk this was deferred      In February 2020 patient had no evidence of metastatic disease on chest abdomen pelvis imaging  Earnstine Flakes is thickening of bladder likely due to chronic bladder outlet syndrome due to obstruction from enlarged prostate  Earnstine Flakes is also a large subcutaneous fluid cystic structure in the mid line lower back measuring 9 2 x 3 8 x 6 6 cm       Bone scan also completed February 2020 and was negative      MRI of the prostate completed in March 2020 showed very low likelihood of cancer present in the prostate      Patient received Lupron or Eligard every 3 months on below dates:  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  He developed subsequent left hemiplegia  He also had difficulty with speech  He required a gastric feeding tube  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 hypertension  Due to hemorrhagic stroke he has not been on antiplatelet or anticoagulant medication      His PEG tube was then removed in July 2022     Due to this episode patient had lapses of visits and Lupron injections      Patient was last seen in the office in February 2022  He had a Axium PET-CT which is specific to prostate cancer  This showed uptake in the large prostate mass compatible with tumor  There is no evidence of metastasis  ROS:  Provided by patient and daughter; daughter provided translation    Review of Systems   Constitutional: Positive for fatigue (has days and nights reversed, specifically last night did not sleep well )  Negative for appetite change, chills and unexpected weight change  Respiratory: Negative for cough and shortness of breath  Cardiovascular: Negative for leg swelling  Gastrointestinal: Negative for abdominal pain, constipation, diarrhea, nausea and vomiting  Genitourinary: Negative for difficulty urinating, dysuria and hematuria  Musculoskeletal: Negative for arthralgias  Skin: Negative  Neurological: Positive for weakness  Negative for dizziness, light-headedness and headaches  Hematological: Negative  Psychiatric/Behavioral: Negative          Past Medical History:   Diagnosis Date   • Hypertension    • Prostate cancer ([facility] [address] )     no surgery required       Past Surgical History:   Procedure Laterality Date   • GASTROSTOMY TUBE PLACEMENT N/A 9/30/2021    Procedure: INSERTION PEG TUBE;  Surgeon: Vickie Hou DO;  Location: [facility];  Service: General   • HERNIA REPAIR         Social History     Socioeconomic History   • Marital status: Single     Spouse name: Not on file   • Number of children: Not on file   • Years of education: Not on file   • Highest education level: Not on file   Occupational History   • Not on file   Tobacco Use   • Smoking status: Never   • Smokeless tobacco: Never   Vaping Use   • Vaping Use: Never used   Substance and Sexual Activity   • Alcohol use: Never   • Drug use: Never   • Sexual activity: Not Currently   Other Topics Concern   • Not on file   Social History Narrative    Consumes 1 cup of coffee per day     Social Determinants of Health     Financial Resource Strain: Not on file   Food Insecurity: Not on file   Transportation Needs: Not on file   Physical Activity: Not on file   Stress: Not on file   Social Connections: Not on file   Intimate Partner Violence: Not on file   Housing Stability: Not on file       Family History   Problem Relation Age of Onset   • No Known Problems Mother    • No Known Problems Father        No Known Allergies      Current Outpatient Medications:   •  Accu-Chek Guide test strip, TEST ONCE DAILY AS NEEDED FOR HYPERGLYCEMIA, Disp: 100 strip, Rfl: 5  •  acetaminophen (TYLENOL) 325 mg tablet, 2 tablets (650 mg total) by Per G Tube route every 6 (six) hours as needed for mild pain or headaches, Disp: 30 tablet, Rfl: 0  •  amLODIPine (NORVASC) 5 mg tablet, TAKE 1 TABLET(5 MG) VIA GTUBE DAILY (Patient taking differently: Take 5 mg by mouth if needed), Disp: 90 tablet, Rfl: 3  •  bisacodyl (DULCOLAX) 10 mg suppository, Insert 10 mg into the rectum as needed for constipation, Disp: , Rfl:   •  doxazosin (CARDURA) 2 mg tablet, TAKE 1 TABLET(2 MG) VIA GTUBE DAILY (Patient taking differently: Take 2 mg by mouth in the morning), Disp: 90 tablet, Rfl: 1  •  finasteride (PROSCAR) 5 mg tablet, TAKE 1 TABLET(5 MG) BY MOUTH DAILY, Disp: 90 tablet, Rfl: 1  •  insulin aspart (NovoLOG FlexPen) 100 UNIT/ML injection pen, Inject according to sliding scale results every 6 hours, Disp: 15 mL, Rfl: 0  •  Insulin Pen Needle (PEN NEEDLES 29GX1/2") 29G X 12MM MISC, Use 4 (four) times a day, Disp: 100 each, Rfl: 0  •  Lancets (accu-chek safe-t pro) lancets, Use as instructed, Disp: 100 each, Rfl: 0  •  lisinopril (ZESTRIL) 5 mg tablet, TAKE 1 TABLET(5 MG) BY MOUTH DAILY, Disp: 90 tablet, Rfl: 1  •  omeprazole (PriLOSEC) 10 mg delayed release capsule, TAKE 1 CAPSULE(10 MG) BY MOUTH DAILY, Disp: 90 capsule, Rfl: 3  •  ondansetron (Zofran ODT) 4 mg disintegrating tablet, Take 1 tablet (4 mg total) by mouth daily as needed for nausea or vomiting, Disp: 20 tablet, Rfl: 0  •  folic acid (FOLVITE) 1 mg tablet, 1 tablet (1 mg total) by Per PEG Tube route daily (Patient not taking: Reported on 9/30/2022), Disp: 90 tablet, Rfl: 1  No current facility-administered medications for this visit  Facility-Administered Medications Ordered in Other Visits:   •  leuprolide (LUPRON DEPOT 3 MONTH KIT) IM injection 22 5 mg, 22 5 mg, Intramuscular, Once, Lee Burks MD    Physical Exam:  /74 (BP Location: Left arm, Patient Position: Sitting, Cuff Size: Standard)   Pulse 72   Temp 98 4 °F (36 9 °C) (Temporal)   Resp 16   Ht 5' 3" (1 6 m)   SpO2 97%   BMI 21 79 kg/m²     Physical Exam  Vitals reviewed  Constitutional:       General: He is not in acute distress  Appearance: He is well-developed  He is not ill-appearing  HENT:      Head: Normocephalic and atraumatic  Eyes:      General: No scleral icterus  Conjunctiva/sclera: Conjunctivae normal    Cardiovascular:      Rate and Rhythm: Normal rate and regular rhythm  Heart sounds: Normal heart sounds  No murmur heard  Pulmonary:      Effort: Pulmonary effort is normal  No respiratory distress  Breath sounds: Normal breath sounds  Abdominal:      Palpations: Abdomen is soft  Tenderness: There is no abdominal tenderness  Musculoskeletal:         General: No tenderness  Normal range of motion  Cervical back: Normal range of motion and neck supple  Right lower leg: No edema  Left lower leg: No edema  Lymphadenopathy:      Cervical: No cervical adenopathy  Skin:     General: Skin is warm and dry  Neurological:      Mental Status: He is alert  Comments: Oriented to person; not oriented to time or place    Psychiatric:         Mood and Affect: Mood normal          Behavior: Behavior normal        Labs:  Lab Results   Component Value Date    WBC 7 58 01/12/2023    HGB 11 5 (L) 01/12/2023    HCT 35 4 (L) 01/12/2023    MCV 96 01/12/2023     01/12/2023     Lab Results   Component Value Date    K 4 0 01/12/2023     01/12/2023    CO2 26 01/12/2023    BUN 23 01/12/2023    CREATININE 0 79 01/12/2023    GLUF 103 (H) 05/19/2020    CALCIUM 9 2 01/12/2023    CORRECTEDCA 9 8 01/12/2023    AST 13 01/12/2023    ALT 15 01/12/2023    ALKPHOS 64 01/12/2023    EGFR 82 01/12/2023     Patient voiced understanding and agreement in the above discussion  Aware to contact our office with questions/symptoms in the interim  This note has been generated by voice recognition software system  Therefore, there may be spelling, grammar, and or syntax errors  Please contact if questions arise

## 2023-01-18 ENCOUNTER — TELEPHONE (OUTPATIENT)
Dept: PULMONOLOGY | Facility: CLINIC | Age: 83
End: 2023-01-18

## 2023-01-18 ENCOUNTER — TELEMEDICINE (OUTPATIENT)
Dept: PULMONOLOGY | Facility: CLINIC | Age: 83
End: 2023-01-18

## 2023-01-18 VITALS — BODY MASS INDEX: 21.79 KG/M2 | WEIGHT: 123 LBS | HEIGHT: 63 IN

## 2023-01-18 DIAGNOSIS — J98.11 MILD BIBASILAR ATELECTASIS: Primary | ICD-10-CM

## 2023-01-18 NOTE — PROGRESS NOTES
Pulmonary Follow Up Note   Emma Ferrell 78 y o  male MRN: [id number]  1/18/2023    Assessment:  Mild bibasilar atelectasis  · Seen on last chest x-ray likely from deconditioning, neuromuscular weakness poststroke  · Mild chest congestion/pulmonary venous  · TTE was unremarkable, showed mild aortic stenosis/regurgitation    Plan:  · Encouraged to use incentive spirometer more frequently, probably every 1-2 hours  · Able to get a goal up to 1000 of now  · Continue all other supportive care for deconditioning/neuromuscular weakness        Return if symptoms worsen or fail to improve  History of Present Illness     REQUIRED DOCUMENTATION:        This service was provided via Telemedicine    Connected via video conference using [employer] provider: Dloores Zhong MD     Identify all parties in room with patient during tele consult: No one else in the room    After connecting through audio, patient was identified by name and date of birth  Patient was then informed that this was a Telemedicine visit and that the exam was being conducted confidentially over secure lines  My office door was closed  No one else was in the room  Patient acknowledged consent and understanding of privacy and security of the Telemedicine visit, and gave us permission to have the assistant stay in the room in order to assist with the history and to conduct the exam  I informed the patient that I have reviewed their record in Epic and presented the opportunity for them to ask any questions regarding the visit today  The patient agreed to participate  Follow up for: Abnormal chest x-ray/mild bibasilar atelectasis    Background:  78 y o  male with a h/o prostate cancer, HTN, ICH, CVA/residual dysphagia, protein calorie malnutrition/failure to thrive, peg tube/now reversed      A chest x-ray at home, per outside report/no imaging available: noted mild worsening at [facility] compared to 02/2022    No change of pulmonary venous congestion,, subsegmental atelectasis at [facility] no pleural effusion  While chest x-ray in 02/2022 reported to have bibasilar mild atelectasis and very small pleural effusion      Patient is a lifelong nonsmoker, no prior history of lung disorders  He is mainly bed-bound since having hemorrhagic stroke in February  Needs assistance with  ADLs spent most of his time sitting in bed, with doing some physical therapy with his daughter  Reported to have orthopnea, more short of breath with laying flat this is noted since about 6 weeks  No cough, fever, chills, sputum production, wheezing, or chest congestion  9/30/2022 visit-given incentive spirometer to be used frequently, repeat chest x-ray in 2 months, check TTE showed mild AAS/AI    Interval History  Since last seen, used incentive spirometer about twice a day, highest able to get 1000 cc  No significant respiratory compromise, no dyspnea at rest, cough, sputum production  Daughter states that he recently received news about PSA is trending up, indicating worse prostatic cancer  They were given options by medical oncology either hospice or other treatment  Currently family has not decided yet which route  Review of Systems  As per hpi, all other systems reviewed and were negative    Studies:  Reviewed    Past medical, surgical, social and family histories reviewed  Medications/Allergies: Reviewed      Vitals: Height 5' 3" (1 6 m), weight 55 8 kg (123 lb)  Body mass index is 21 79 kg/m²  Physical Exam  Body mass index is 21 79 kg/m²     Gen: not in acute distress  Ear: normal appearance, no significant hearing impairment  Chest: normal respiratory efforts, able to speak in full sentences   Neuro: AAO X3        Labs:  Lab Results   Component Value Date    WBC 7 58 01/12/2023    HGB 11 5 (L) 01/12/2023    HCT 35 4 (L) 01/12/2023    MCV 96 01/12/2023     01/12/2023     Lab Results   Component Value Date    CALCIUM 9 2 01/12/2023    K 4 0 01/12/2023    CO2 26 01/12/2023     01/12/2023    BUN 23 01/12/2023    CREATININE 0 79 01/12/2023     No results found for: IGE  Lab Results   Component Value Date    ALT 15 01/12/2023    AST 13 01/12/2023    ALKPHOS 64 01/12/2023           Portions of the record may have been created with voice recognition software  Occasional wrong word or "sound a like" substitutions may have occurred due to the inherent limitations of voice recognition software  Read the chart carefully and recognize, using context, where substitutions have occurred    ANIRUDH Damon [facility]

## 2023-03-21 DIAGNOSIS — I10 PRIMARY HYPERTENSION: ICD-10-CM

## 2023-03-21 RX ORDER — LISINOPRIL 5 MG/1
TABLET ORAL
Qty: 90 TABLET | Refills: 1 | Status: SHIPPED | OUTPATIENT
Start: 2023-03-21

## 2023-03-21 RX ORDER — AMLODIPINE BESYLATE 5 MG/1
5 TABLET ORAL DAILY
Qty: 90 TABLET | Refills: 1 | Status: SHIPPED | OUTPATIENT
Start: 2023-03-21

## 2023-04-04 DIAGNOSIS — C61 PROSTATE CANCER (HCC): Primary | ICD-10-CM

## 2023-04-26 ENCOUNTER — TELEPHONE (OUTPATIENT)
Dept: HEMATOLOGY ONCOLOGY | Facility: CLINIC | Age: 83
End: 2023-04-26

## 2023-04-26 ENCOUNTER — TELEPHONE (OUTPATIENT)
Dept: LAB | Facility: HOSPITAL | Age: 83
End: 2023-04-26

## 2023-04-26 NOTE — TELEPHONE ENCOUNTER
Patient Call    Who are you speaking with? Child    If it is not the patient, are they listed on an active communication consent form? Yes   What is the reason for this call? Angel Rosales is calling to discuss patients care plan  She would like to clarify what the timing of his infusion, follow up, and labs needs to be  Does this require a call back? Yes   If a call back is required, please list best call back number [phone]   If a call back is required, advise that a message will be forwarded to their care team and someone will return their call as soon as possible  Did you relay this information to the patient?  Yes

## 2023-04-27 ENCOUNTER — APPOINTMENT (OUTPATIENT)
Dept: LAB | Facility: HOSPITAL | Age: 83
End: 2023-04-27

## 2023-04-28 ENCOUNTER — HOSPITAL ENCOUNTER (OUTPATIENT)
Dept: INFUSION CENTER | Facility: CLINIC | Age: 83
Discharge: HOME | End: 2023-04-28

## 2023-04-28 VITALS
TEMPERATURE: 99 F | HEART RATE: 73 BPM | RESPIRATION RATE: 18 BRPM | SYSTOLIC BLOOD PRESSURE: 107 MMHG | DIASTOLIC BLOOD PRESSURE: 70 MMHG

## 2023-04-28 DIAGNOSIS — C61 PROSTATE CANCER (HCC): Primary | ICD-10-CM

## 2023-04-28 RX ADMIN — LEUPROLIDE ACETATE 22.5 MG: KIT at 12:44

## 2023-04-28 NOTE — PROGRESS NOTES
Pt presents for Lupron  Pt arrived on stretcher accompanied by EMS and daughter  Daughter reports periods of confusion, some itching and burning with urination  Daughter cares for pt at home, no home health  Pt has a brief on, is well dressed, clean shaved  Dr Jackee Dubin made aware, ok to treat  Pt's daughter to call PCP for urinary symptoms  Pt tolerated injection in R buttock without adverse reaction  Future appointments discussed  AVS declined

## 2023-05-15 ENCOUNTER — TELEPHONE (OUTPATIENT)
Dept: HEMATOLOGY ONCOLOGY | Facility: CLINIC | Age: 83
End: 2023-05-15

## 2023-05-15 NOTE — TELEPHONE ENCOUNTER
Spoke with patient's daughter  Reviewed PSA stating that it is increasing more; significant increase from 41 7 to 131  8  this is showing worsening of the prostate cancer  She states he remains symptomatically the same  They are not interested in pursing other treatments  Please schedule a follow up appt with me or Dr Lissy Vargas same day as the infusion; infusion appt will need to be moved to a Tues or Weds

## 2023-06-26 DIAGNOSIS — K21.9 GASTROESOPHAGEAL REFLUX DISEASE, UNSPECIFIED WHETHER ESOPHAGITIS PRESENT: ICD-10-CM

## 2023-06-26 RX ORDER — OMEPRAZOLE 10 MG/1
CAPSULE, DELAYED RELEASE ORAL
Qty: 90 CAPSULE | Refills: 3 | Status: SHIPPED | OUTPATIENT
Start: 2023-06-26

## 2023-06-27 NOTE — TELEPHONE ENCOUNTER
Disregard, keep patient as scheduled for 4/12  Thanks! LM to inquire if pt has a cpap or bipap presently or any sleep studies in the past

## 2023-07-07 ENCOUNTER — TELEPHONE (OUTPATIENT)
Dept: NEUROLOGY | Facility: CLINIC | Age: 83
End: 2023-07-07

## 2023-07-07 ENCOUNTER — TELEMEDICINE (OUTPATIENT)
Dept: NEUROLOGY | Facility: CLINIC | Age: 83
End: 2023-07-07

## 2023-07-07 VITALS — DIASTOLIC BLOOD PRESSURE: 80 MMHG | SYSTOLIC BLOOD PRESSURE: 122 MMHG

## 2023-07-07 DIAGNOSIS — I68.0 CEREBRAL AMYLOID ANGIOPATHY (HCC): ICD-10-CM

## 2023-07-07 DIAGNOSIS — I61.9 CEREBRAL HEMORRHAGE (HCC): Primary | ICD-10-CM

## 2023-07-07 DIAGNOSIS — E85.4 CEREBRAL AMYLOID ANGIOPATHY (HCC): ICD-10-CM

## 2023-07-07 NOTE — PROGRESS NOTES
Virtual Regular Visit    Verification of patient location: home     Patient is located at Home in the following state in which I hold an active license [place]      Assessment/Plan:    Cerebral Amyloid Angiopathy:    -Continue to avoid antiplatelet medication and anticoagulation medication at this time. Patient should try to limit and avoid NSAIDs as well. Medications such as ibuprofen or naproxen should be avoided for acute pain relief, patient is perfectly capable of taking Tylenol as long as his liver enzymes are not elevated with taking the medication. History of Cerebral hemorrhage:    I had the pleasure of seeing Nolan Sanchez today over [facility] 22 virtual video visit today. He is presenting as a follow-up in regard to his history of cerebral hemorrhage and cerebral amyloid angiopathy. Patient is reported to be doing well at this time. No new strokelike symptoms currently. 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 Patient's family is frequently helping the patient stretch out his fingers and toes so they do not become so stiff. Patient does have some slight pain with trying to manipulate the left hand. He is much more willing to wear a brace on the left hand than the left foot at this time. Patient is still following with his urology and oncology team in regards to his prostate cancer. No real complaints or concerns about the patient today, just besides the fact that over the last week he has had a suppressed appetite. However, the patient's family is able to get some protein rich beverages for the patient to drink at this time.    -I would recommend that the patient continue to stay off aspirin or any other antiplatelet/anticoagulation meds currently.   Since the patient's stroke was related to a hemorrhage rather than an infarct, the patient does not need to be on any antiplatelet therapy currently especially due to the fact that he also has cerebral amyloid angiopathy.  -As far as the patient's cholesterol goes, his most recent LDL reading was 108 mg/dL. Given that the patient did have a hemorrhagic stroke rather than an ischemic stroke, we do not technically need the patient to be on a cholesterol-lowering medication unless his cholesterol is already slightly elevated. However, it was noted in the past by Radha Dumas that the patient had increased LFTs when given a statin medication to try to lower cholesterol. In this case, I would encourage that the patient and his family talk to their primary care provider about his cholesterol and see if there is any need to start on any medication that would not be related to his statin that may potentially have negative impact on his liver function.    - Recommend to check blood pressure occasionally away from the doctor's office to make sure that those numbers are typically less than 130/80. If they are frequently higher than that, we recommend checking a little more often and to follow up with primary care team   - Will defer to primary care team for monitoring of cholesterol panel and blood sugar numbers with target LDL cholesterol of less than 70 and hemoglobin A1c less than 7%  - Recommend following a low salt, mediterranean diet   - Recommend routine physical exercise as tolerated     We will plan for him to return to the office in 6 months time to see on of the APPs or Dr. Colin Clinton but would be happy to see him sooner if the need should arise. If he has any symptoms concerning for TIA or stroke including sudden painless loss of vision or double vision, difficulty speaking or swallowing, vertigo/room spinning that does not quickly resolve, or weakness/numbness/loss of coordination affecting 1 side of the face or body he should proceed by ambulance to the nearest emergency room immediately.          Reason for visit is   Chief Complaint   Patient presents with   • Virtual Regular Visit        Encounter provider Holli Norton PA-C    Provider located at [address] [address]      Recent Visits  No visits were found meeting these conditions. Showing recent visits within past 7 days and meeting all other requirements  Today's Visits  Date Type Provider Dept   07/07/23 Telephone Holli Norton PA-C [facility] today's visits and meeting all other requirements  Future Appointments  No visits were found meeting these conditions. Showing future appointments within next 150 days and meeting all other requirements       The patient was identified by name and date of birth. Valarie Hoyos was informed that this is a telemedicine visit and that the visit is being conducted through the [address] [place] [address] 22 Now platform. He agrees to proceed. .  My office door was closed. No one else was in the room. He acknowledged consent and understanding of privacy and security of the video platform. The patient has agreed to participate and understands they can discontinue the visit at any time. Patient is aware this is a billable service. Subjective  Valarie Hoyos is a 78 y.o. male     Interval History:    No new stroke like symptoms at this time. Whole left sided hemiparesis at this moment at time. Trying to manipulate and move his limbs around to keep them active. Patient is full Julieanne Amel lift at this time his family states. Losing more tone of the left foot she states. Patient does not want to use a brace at this time for the left foot. Is able to stretch out his hand, has pain with stretching. Manually having to stretch and move the hands. Not taking aspirin at this moment in time. Patient had a hemorrhagic stroke most likely from CAA vs hypertension. Has not been able to tolerate statin medication due to his liver function.   mg/dL, should check in with his PCP in regards to lower is cholesterol. Has blood work quite frequently with urology and oncology in regards to the patient's current prostate cancer. Patient's PSA levels have been recently elevated. Not doing any chemotherapy currently, is just currently using hormone injections. Checking his blood pressure at home daily, happens before his medication is distributed. Monitored blood sugar, was being given insulin but no preexisting history of diabetes. Has been struggling with losing his appetite as of late. Just not wanting to eat as much at this moment in time. Just has not felt like eating at least. Has been drinking water throughout the day. Will drink a boost or ensure as well. Not any depression/anxiety currently, not much has changed since the last visit. Memory and forgetfulness have not been as bad as of late. Has improved since his last visit. Sleep has been fairly normal as well. Has not been waking up as frequently throughout the night.      HPI     Past Medical History:   Diagnosis Date   • Hypertension    • Prostate cancer ([address])     no surgery required       Past Surgical History:   Procedure Laterality Date   • GASTROSTOMY TUBE PLACEMENT N/A 9/30/2021    Procedure: INSERTION PEG TUBE;  Surgeon: Abelino Hopkins DO;  Location: [facility];  Service: General   • HERNIA REPAIR         Current Outpatient Medications   Medication Sig Dispense Refill   • Accu-Chek Guide test strip TEST ONCE DAILY AS NEEDED FOR HYPERGLYCEMIA 100 strip 5   • acetaminophen (TYLENOL) 325 mg tablet 2 tablets (650 mg total) by Per G Tube route every 6 (six) hours as needed for mild pain or headaches 30 tablet 0   • amLODIPine (NORVASC) 5 mg tablet Take 1 tablet (5 mg total) by mouth daily 90 tablet 1   • bisacodyl (DULCOLAX) 10 mg suppository Insert 10 mg into the rectum as needed for constipation     • doxazosin (CARDURA) 2 mg tablet TAKE 1 TABLET(2 MG) VIA GTUBE DAILY (Patient taking differently: Take 2 mg by mouth in the morning) 90 tablet 1   • finasteride (PROSCAR) 5 mg tablet TAKE 1 TABLET(5 MG) BY MOUTH DAILY 90 tablet 1   • folic acid (FOLVITE) 1 mg tablet 1 tablet (1 mg total) by Per PEG Tube route daily (Patient not taking: Reported on 9/30/2022) 90 tablet 1   • insulin aspart (NovoLOG FlexPen) 100 UNIT/ML injection pen Inject according to sliding scale results every 6 hours 15 mL 0   • Insulin Pen Needle (PEN NEEDLES 29GX1/2") 29G X 12MM MISC Use 4 (four) times a day 100 each 0   • Lancets (accu-chek safe-t pro) lancets Use as instructed 100 each 0   • lisinopril (ZESTRIL) 5 mg tablet TAKE 1 TABLET(5 MG) BY MOUTH DAILY 90 tablet 1   • omeprazole (PriLOSEC) 10 mg delayed release capsule TAKE 1 CAPSULE(10 MG) BY MOUTH DAILY 90 capsule 3   • ondansetron (Zofran ODT) 4 mg disintegrating tablet Take 1 tablet (4 mg total) by mouth daily as needed for nausea or vomiting 20 tablet 0     No current facility-administered medications for this visit. No Known Allergies    Review of Systems   Constitutional: Negative for appetite change, fatigue and fever. HENT: Negative. Negative for hearing loss, tinnitus, trouble swallowing and voice change. Eyes: Negative. Negative for photophobia, pain and visual disturbance. Respiratory: Negative. Negative for shortness of breath. Cardiovascular: Negative. Negative for palpitations. Gastrointestinal: Negative. Negative for nausea and vomiting. Endocrine: Negative. Negative for cold intolerance. Genitourinary: Negative. Negative for dysuria, frequency and urgency. Musculoskeletal: Negative for back pain, gait problem, myalgias and neck pain. Skin: Negative. Negative for rash. Allergic/Immunologic: Negative. Neurological: Negative. Negative for dizziness, tremors, seizures, syncope, facial asymmetry, speech difficulty, weakness, light-headedness, numbness and headaches. Hematological: Negative. Does not bruise/bleed easily. Psychiatric/Behavioral: Negative.   Negative for confusion, hallucinations and sleep disturbance. Video Exam    Physical Exam:                                                                 Vitals:            Constitutional:    /80 (BP Location: Right arm, Patient Position: Sitting, Cuff Size: Standard)   BP Readings from Last 3 Encounters:   07/07/23 122/80   04/28/23 107/70   01/17/23 140/74     Pulse Readings from Last 3 Encounters:   04/28/23 73   01/17/23 72   01/12/23 67         Well developed, well nourished, well groomed. No dysmorphic features. Psychiatric:  Normal behavior and appropriate affect        Neurological Examination:     Mental status/cognitive function:   Orientated to time, place and person. Recent and remote memory intact. Attention span and concentration as well as fund of knowledge are appropriate for age. Normal language and spontaneous speech. Cranial Nerves:  II-visual fields full. Fundi poorly visualized due to pupillary constriction  III, IV, VI-Pupils were equal, round, and reactive to light and accomodation. Extraocular movements were full and conjugate without nystagmus. Conjugate gaze, normal smooth pursuits, normal saccades   V-facial sensation symmetric. VII-facial expression symmetric, intact forehead wrinkle, strong eye closure, symmetric smile    VIII-hearing grossly intact bilaterally   IX, X-palate elevation symmetric, no dysarthria. XI-shoulder shrug strength intact    XII-tongue protrusion midline.       Visit Time  Total Visit Duration: 21 minutes    Lacey Julien PA-C  07/07/2023

## 2023-07-07 NOTE — TELEPHONE ENCOUNTER
Patient daughter Stephanie Rodney called to switch the patient appointment to virtual because the  Patient's ambulance transportation never showed up.     Switched the appt to virtual via [employer] Text to : [phone]

## 2023-07-07 NOTE — PATIENT INSTRUCTIONS
Cerebral Amyloid Angiopathy:    -Continue to avoid antiplatelet medication and anticoagulation medication at this time. Patient should try to limit and avoid NSAIDs as well. Medications such as ibuprofen or naproxen should be avoided for acute pain relief, patient is perfectly capable of taking Tylenol as long as his liver enzymes are not elevated with taking the medication. History of Cerebral hemorrhage:    I had the pleasure of seeing Froylan Swanson today over [facility] 22 virtual video visit today. He is presenting as a follow-up in regard to his history of cerebral hemorrhage and cerebral amyloid angiopathy. Patient is reported to be doing well at this time. No new strokelike symptoms currently. 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 Patient's family is frequently helping the patient stretch out his fingers and toes so they do not become so stiff. Patient does have some slight pain with trying to manipulate the left hand. He is much more willing to wear a brace on the left hand than the left foot at this time. Patient is still following with his urology and oncology team in regards to his prostate cancer. No real complaints or concerns about the patient today, just besides the fact that over the last week he has had a suppressed appetite. However, the patient's family is able to get some protein rich beverages for the patient to drink at this time.    -I would recommend that the patient continue to stay off aspirin or any other antiplatelet/anticoagulation meds currently. Since the patient's stroke was related to a hemorrhage rather than an infarct, the patient does not need to be on any antiplatelet therapy currently especially due to the fact that he also has cerebral amyloid angiopathy.  -As far as the patient's cholesterol goes, his most recent LDL reading was 108 mg/dL.   Given that the patient did have a hemorrhagic stroke rather than an ischemic stroke, we do not technically need the patient to be on a cholesterol-lowering medication unless his cholesterol is already slightly elevated. However, it was noted in the past by Ana Chavarria that the patient had increased LFTs when given a statin medication to try to lower cholesterol. In this case, I would encourage that the patient and his family talk to their primary care provider about his cholesterol and see if there is any need to start on any medication that would not be related to his statin that may potentially have negative impact on his liver function.    - Recommend to check blood pressure occasionally away from the doctor's office to make sure that those numbers are typically less than 130/80. If they are frequently higher than that, we recommend checking a little more often and to follow up with primary care team   - Will defer to primary care team for monitoring of cholesterol panel and blood sugar numbers with target LDL cholesterol of less than 70 and hemoglobin A1c less than 7%  - Recommend following a low salt, mediterranean diet   - Recommend routine physical exercise as tolerated     We will plan for him to return to the office in 6 months time to see on of the APPs or Dr. Marni Haji but would be happy to see him sooner if the need should arise. If he has any symptoms concerning for TIA or stroke including sudden painless loss of vision or double vision, difficulty speaking or swallowing, vertigo/room spinning that does not quickly resolve, or weakness/numbness/loss of coordination affecting 1 side of the face or body he should proceed by ambulance to the nearest emergency room immediately.

## 2023-07-13 ENCOUNTER — TELEPHONE (OUTPATIENT)
Dept: LAB | Facility: HOSPITAL | Age: 83
End: 2023-07-13

## 2023-07-13 ENCOUNTER — TELEPHONE (OUTPATIENT)
Dept: HEMATOLOGY ONCOLOGY | Facility: CLINIC | Age: 83
End: 2023-07-13

## 2023-07-13 DIAGNOSIS — C61 PROSTATE CANCER (HCC): Primary | ICD-10-CM

## 2023-07-17 ENCOUNTER — TELEPHONE (OUTPATIENT)
Dept: HEMATOLOGY ONCOLOGY | Facility: CLINIC | Age: 83
End: 2023-07-17

## 2023-07-17 ENCOUNTER — TELEPHONE (OUTPATIENT)
Dept: FAMILY MEDICINE CLINIC | Facility: CLINIC | Age: 83
End: 2023-07-17

## 2023-07-17 NOTE — TELEPHONE ENCOUNTER
Patient's daughter Jovanna Glass called and stated patient needs a new electric twin bed and also with a gel padding for the mattress, if you would send a script  to a DME supp[lier. She uses [employer] previously and wasn't thrilled with them, if you might have any other DME Supplier suggestions. Please advise Jovanna Glass at [phone].

## 2023-07-17 NOTE — TELEPHONE ENCOUNTER
Appointment Change  Cancel, Reschedule, Change to Virtual      Who are you speaking with? Child   If it is not the patient, are they listed on an active communication consent form? Yes   Which provider is the appointment scheduled with? Mike Rosario PA-C   When is the appointment scheduled? Please list date and time 7/18/23   At which location is the appointment scheduled to take place? [place]   Was the appointment rescheduled or changed from an in person visit to a virtual visit? If so, please list the details of the change. 8/16/23   What is the reason for the appointment change? Patient didn't get labs done   Was [facility] transport scheduled for this visit? No   Does [facility] transport need to be scheduled for the new visit (if applicable) No   Does the patient need an infusion appointment rescheduled? Yes   Does the patient have an infusion appointment scheduled? If so, when? Yes, 7/21/23   Is the patient undergoing chemotherapy? N/A   Was the no-show policy reviewed for appointments being changed with less then 24 hours of notice?  Yes

## 2023-07-18 ENCOUNTER — APPOINTMENT (OUTPATIENT)
Dept: LAB | Facility: HOSPITAL | Age: 83
End: 2023-07-18
Attending: INTERNAL MEDICINE
Payer: COMMERCIAL

## 2023-07-18 DIAGNOSIS — C61 PROSTATE CANCER (HCC): ICD-10-CM

## 2023-07-18 LAB
ALBUMIN SERPL BCP-MCNC: 2.7 G/DL (ref 3.5–5)
ALP SERPL-CCNC: 66 U/L (ref 46–116)
ALT SERPL W P-5'-P-CCNC: 23 U/L (ref 12–78)
ANION GAP SERPL CALCULATED.3IONS-SCNC: 3 MMOL/L
AST SERPL W P-5'-P-CCNC: 29 U/L (ref 5–45)
BASOPHILS # BLD AUTO: 0.05 THOUSANDS/ÂΜL (ref 0–0.1)
BASOPHILS NFR BLD AUTO: 1 % (ref 0–1)
BILIRUB SERPL-MCNC: 0.29 MG/DL (ref 0.2–1)
BUN SERPL-MCNC: 36 MG/DL (ref 5–25)
CALCIUM ALBUM COR SERPL-MCNC: 10.3 MG/DL (ref 8.3–10.1)
CALCIUM SERPL-MCNC: 9.3 MG/DL (ref 8.3–10.1)
CHLORIDE SERPL-SCNC: 109 MMOL/L (ref 96–108)
CO2 SERPL-SCNC: 28 MMOL/L (ref 21–32)
CREAT SERPL-MCNC: 1.15 MG/DL (ref 0.6–1.3)
EOSINOPHIL # BLD AUTO: 0.13 THOUSAND/ÂΜL (ref 0–0.61)
EOSINOPHIL NFR BLD AUTO: 1 % (ref 0–6)
ERYTHROCYTE [DISTWIDTH] IN BLOOD BY AUTOMATED COUNT: 13.4 % (ref 11.6–15.1)
GFR SERPL CREATININE-BSD FRML MDRD: 57 ML/MIN/1.73SQ M
GLUCOSE SERPL-MCNC: 102 MG/DL (ref 65–140)
HCT VFR BLD AUTO: 33.6 % (ref 36.5–49.3)
HGB BLD-MCNC: 10.5 G/DL (ref 12–17)
IMM GRANULOCYTES # BLD AUTO: 0.02 THOUSAND/UL (ref 0–0.2)
IMM GRANULOCYTES NFR BLD AUTO: 0 % (ref 0–2)
LYMPHOCYTES # BLD AUTO: 2.61 THOUSANDS/ÂΜL (ref 0.6–4.47)
LYMPHOCYTES NFR BLD AUTO: 28 % (ref 14–44)
MCH RBC QN AUTO: 30.2 PG (ref 26.8–34.3)
MCHC RBC AUTO-ENTMCNC: 31.3 G/DL (ref 31.4–37.4)
MCV RBC AUTO: 97 FL (ref 82–98)
MONOCYTES # BLD AUTO: 0.78 THOUSAND/ÂΜL (ref 0.17–1.22)
MONOCYTES NFR BLD AUTO: 8 % (ref 4–12)
NEUTROPHILS # BLD AUTO: 5.7 THOUSANDS/ÂΜL (ref 1.85–7.62)
NEUTS SEG NFR BLD AUTO: 62 % (ref 43–75)
NRBC BLD AUTO-RTO: 0 /100 WBCS
PLATELET # BLD AUTO: 333 THOUSANDS/UL (ref 149–390)
PMV BLD AUTO: 10 FL (ref 8.9–12.7)
POTASSIUM SERPL-SCNC: 4.5 MMOL/L (ref 3.5–5.3)
PROT SERPL-MCNC: 7.7 G/DL (ref 6.4–8.4)
PSA SERPL-MCNC: 246.87 NG/ML (ref 0–4)
RBC # BLD AUTO: 3.48 MILLION/UL (ref 3.88–5.62)
SODIUM SERPL-SCNC: 140 MMOL/L (ref 135–147)
WBC # BLD AUTO: 9.29 THOUSAND/UL (ref 4.31–10.16)

## 2023-07-18 PROCEDURE — 85025 COMPLETE CBC W/AUTO DIFF WBC: CPT

## 2023-07-18 PROCEDURE — 36415 COLL VENOUS BLD VENIPUNCTURE: CPT

## 2023-07-18 PROCEDURE — 84153 ASSAY OF PSA TOTAL: CPT

## 2023-07-18 PROCEDURE — 80053 COMPREHEN METABOLIC PANEL: CPT

## 2023-07-19 ENCOUNTER — TELEPHONE (OUTPATIENT)
Dept: HEMATOLOGY ONCOLOGY | Facility: CLINIC | Age: 83
End: 2023-07-19

## 2023-07-19 NOTE — TELEPHONE ENCOUNTER
Patient Call    Who are you speaking with? Patient and Child    If it is not the patient, are they listed on an active communication consent form? N/A   What is the reason for this call? Daughter calling to speak with Dr. Juvenal Wasserman regarding patient. She would not discuss. Does this require a call back? yes   If a call back is required, please list best call back number [phone]   If a call back is required, advise that a message will be forwarded to their care team and someone will return their call as soon as possible. Did you relay this information to the patient?  yes

## 2023-07-20 NOTE — TELEPHONE ENCOUNTER
Returned call to patients daughter. Explained that PSA went from 131.8 - 246.87  Patient is not benefiting from Lupron. Per Dr. Sam Abraham patient can stop Lupron due to rising PSA  Daughter is agreeable and verbalized understanding.   I canceled Lupron injection scheduled for 7/21/23 - plan removed and infusion center notified  Patient will keep appointment as scheduled on 8/15/23

## 2023-07-21 ENCOUNTER — HOSPITAL ENCOUNTER (OUTPATIENT)
Dept: INFUSION CENTER | Facility: CLINIC | Age: 83
End: 2023-07-21

## 2023-08-15 ENCOUNTER — OFFICE VISIT (OUTPATIENT)
Dept: HEMATOLOGY ONCOLOGY | Facility: CLINIC | Age: 83
End: 2023-08-15

## 2023-08-15 VITALS
HEART RATE: 69 BPM | DIASTOLIC BLOOD PRESSURE: 68 MMHG | SYSTOLIC BLOOD PRESSURE: 130 MMHG | TEMPERATURE: 98.7 F | OXYGEN SATURATION: 96 % | RESPIRATION RATE: 18 BRPM

## 2023-08-15 DIAGNOSIS — C61 PROSTATE CANCER (HCC): Primary | ICD-10-CM

## 2023-08-15 NOTE — PROGRESS NOTES
Hematology/Oncology Outpatient Follow-up  Carmelo Penn 78 y.o. male 1940 [id number]    Date:  8/15/2023  Assessment and Plan:    1. Prostate cancer [facility])  23-year-old male presents for follow-up visit for prostate cancer. Patient has increasing PSA. He has been on Lupron. Lupron was canceled most recently secondary to increasing PSA. I reviewed what Lupron is and how it works with patient's daughter. As previously reviewed oral therapy was recommended for his prostate cancer. He still remains unable to walk or move on his own secondary to left-sided weakness from previous CVA. Patient's ECOG status is 4. Patient and patient daughter would like to continue to pursue conservative approach with symptom management. Due to this patient does not need continued follow-up here as he is not pursuing active cancer therapy. He should continue to follow with Dr. Juliano Pichardo. He could then follow with palliative care if needed or just transition to hospice cares in the future when patient's status declines. Reviewed daughter's of the prostate cancer itself is a slow-moving cancer and unclear if this would be the cause of his death. We will be open for any questions in the future but we will see patient as needed.     HPI:  23-year-old male presents for follow-up for prostate cancer.     In 2010 patient was diagnosed with low risk Nilay score 6 prostate cancer in the [address] was given opportunity to be in treatment but due to low risk this was deferred.     In February 2020 patient had no evidence of metastatic disease on chest abdomen pelvis imaging. Anne Bowling is thickening of bladder likely due to chronic bladder outlet syndrome due to obstruction from enlarged prostate. Anne Bowling is also a large subcutaneous fluid cystic structure in the mid line lower back measuring 9.2 x 3.8 x 6.6 cm.      Bone scan also completed February 2020 and was negative.     MRI of the prostate completed in March 2020 showed very low likelihood of cancer present in the prostate.     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  He also had difficulty with speech.  He required a gastric feeding tube.  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 hypertension.  Due to hemorrhagic stroke he has not been on antiplatelet or anticoagulant medication.     His PEG tube was then removed in July 2022     Due to this episode patient had lapses of visits and Lupron injections.     February 2022 He had a Axium PET-CT which is specific to prostate cancer. This showed uptake in the large prostate mass compatible with tumor. There is no evidence of metastasis. Interval history:    ROS: obtained from patient and daughter; daughter denied  and translated during the visit      Review of Systems   Constitutional: Positive for fatigue. Negative for appetite change, chills, fever and unexpected weight change. Respiratory: Negative for cough and shortness of breath. Cardiovascular: Negative for chest pain, palpitations and leg swelling. Gastrointestinal: Negative for abdominal pain, constipation, diarrhea, nausea and vomiting. Genitourinary: Negative for difficulty urinating, dysuria and hematuria. Musculoskeletal: Positive for arthralgias (occassional, does not always even need OTC medication). Skin: Negative. Neurological: Positive for weakness (chronic from CVA on the left side). Negative for dizziness, light-headedness, numbness and headaches. Hematological: Negative. Psychiatric/Behavioral: Negative.         Past Medical History:   Diagnosis Date   • Hypertension    • Prostate cancer ([address])     no surgery required       Past Surgical History:   Procedure Laterality Date   • GASTROSTOMY TUBE PLACEMENT N/A 9/30/2021    Procedure: INSERTION PEG TUBE;  Surgeon: Kimberly Romeo DO;  Location: [facility];  Service: General   • HERNIA REPAIR         Social History     Socioeconomic History   • Marital status: Single     Spouse name: None   • Number of children: None   • Years of education: None   • Highest education level: None   Occupational History   • None   Tobacco Use   • Smoking status: Never   • Smokeless tobacco: Never   Vaping Use   • Vaping Use: Never used   Substance and Sexual Activity   • Alcohol use: Never   • Drug use: Never   • Sexual activity: Not Currently   Other Topics Concern   • None   Social History Narrative    Consumes 1 cup of coffee per day     Social Determinants of Health     Financial Resource Strain: Not on file   Food Insecurity: Not on file   Transportation Needs: Not on file   Physical Activity: Not on file   Stress: Not on file   Social Connections: Not on file   Intimate Partner Violence: Not on file   Housing Stability: Not on file       Family History   Problem Relation Age of Onset   • No Known Problems Mother    • No Known Problems Father        No Known Allergies      Current Outpatient Medications:   •  Accu-Chek Guide test strip, TEST ONCE DAILY AS NEEDED FOR HYPERGLYCEMIA, Disp: 100 strip, Rfl: 5  •  acetaminophen (TYLENOL) 325 mg tablet, 2 tablets (650 mg total) by Per G Tube route every 6 (six) hours as needed for mild pain or headaches, Disp: 30 tablet, Rfl: 0  •  amLODIPine (NORVASC) 5 mg tablet, Take 1 tablet (5 mg total) by mouth daily, Disp: 90 tablet, Rfl: 1  •  bisacodyl (DULCOLAX) 10 mg suppository, Insert 10 mg into the rectum as needed for constipation, Disp: , Rfl:   •  doxazosin (CARDURA) 2 mg tablet, TAKE 1 TABLET(2 MG) VIA GTUBE DAILY (Patient taking differently: Take 2 mg by mouth in the morning), Disp: 90 tablet, Rfl: 1  •  finasteride (PROSCAR) 5 mg tablet, TAKE 1 TABLET(5 MG) BY MOUTH DAILY, Disp: 90 tablet, Rfl: 1  •  insulin aspart (NovoLOG FlexPen) 100 UNIT/ML injection pen, Inject according to sliding scale results every 6 hours, Disp: 15 mL, Rfl: 0  •  Insulin Pen Needle (PEN NEEDLES 29GX1/2") 29G X 12MM MISC, Use 4 (four) times a day, Disp: 100 each, Rfl: 0  •  Lancets (accu-chek safe-t pro) lancets, Use as instructed, Disp: 100 each, Rfl: 0  •  lisinopril (ZESTRIL) 5 mg tablet, TAKE 1 TABLET(5 MG) BY MOUTH DAILY, Disp: 90 tablet, Rfl: 1  •  omeprazole (PriLOSEC) 10 mg delayed release capsule, TAKE 1 CAPSULE(10 MG) BY MOUTH DAILY, Disp: 90 capsule, Rfl: 3  •  ondansetron (Zofran ODT) 4 mg disintegrating tablet, Take 1 tablet (4 mg total) by mouth daily as needed for nausea or vomiting, Disp: 20 tablet, Rfl: 0  •  folic acid (FOLVITE) 1 mg tablet, 1 tablet (1 mg total) by Per PEG Tube route daily (Patient not taking: Reported on 9/30/2022), Disp: 90 tablet, Rfl: 1      Physical Exam:  /68 (BP Location: Right arm, Patient Position: Sitting, Cuff Size: Adult)   Pulse 69   Temp 98.7 °F (37.1 °C) (Temporal)   Resp 18   SpO2 96%     Physical Exam  Vitals reviewed. Constitutional:       General: He is not in acute distress. Appearance: He is well-developed. HENT:      Head: Normocephalic and atraumatic. Eyes:      General: No scleral icterus. Conjunctiva/sclera: Conjunctivae normal.   Cardiovascular:      Rate and Rhythm: Normal rate and regular rhythm. Heart sounds: Normal heart sounds. No murmur heard. Pulmonary:      Effort: Pulmonary effort is normal. No respiratory distress. Breath sounds: Normal breath sounds. Abdominal:      Palpations: Abdomen is soft. Tenderness: There is no abdominal tenderness. Musculoskeletal:         General: No tenderness. Normal range of motion. Cervical back: Normal range of motion and neck supple. Comments: In stretcher    Lymphadenopathy:      Cervical: No cervical adenopathy. Skin:     General: Skin is warm and dry. Neurological:      Mental Status: He is alert.       Comments: Chronic left sided weakness 2/2 CVA hx   Psychiatric:         Mood and Affect: Mood normal. Behavior: Behavior normal.       Labs:  Lab Results   Component Value Date    WBC 9.29 07/18/2023    HGB 10.5 (L) 07/18/2023    HCT 33.6 (L) 07/18/2023    MCV 97 07/18/2023     07/18/2023     I have spent 30 minutes with Patient and family today in which greater than 50% of this time was spent in counseling/coordination of care regarding Diagnostic results, Risks and benefits of tx options, Instructions for management, Impressions, Documenting in the medical record, Reviewing / ordering tests, medicine, procedures   and Obtaining or reviewing history  . Patient voiced understanding and agreement in the above discussion. Aware to contact our office with questions/symptoms in the interim. This note has been generated by voice recognition software system. Therefore, there may be spelling, grammar, and or syntax errors. Please contact if questions arise.

## 2023-10-05 ENCOUNTER — RA CDI HCC (OUTPATIENT)
Dept: OTHER | Facility: HOSPITAL | Age: 83
End: 2023-10-05

## 2023-10-05 NOTE — PROGRESS NOTES
[facility] coding opportunities          Chart Reviewed number of suggestions sent to Provider: 1  [id number]     Patients Insurance     Medicare Insurance: [facility] Advantage

## 2023-10-08 DIAGNOSIS — C61 PROSTATE CANCER (HCC): ICD-10-CM

## 2023-10-08 DIAGNOSIS — R39.9 LOWER URINARY TRACT SYMPTOMS: ICD-10-CM

## 2023-10-08 DIAGNOSIS — I10 PRIMARY HYPERTENSION: ICD-10-CM

## 2023-10-09 RX ORDER — DOXAZOSIN 2 MG/1
TABLET ORAL
Qty: 90 TABLET | Refills: 1 | Status: SHIPPED | OUTPATIENT
Start: 2023-10-09

## 2023-10-09 RX ORDER — FINASTERIDE 5 MG/1
TABLET, FILM COATED ORAL
Qty: 90 TABLET | Refills: 1 | Status: SHIPPED | OUTPATIENT
Start: 2023-10-09

## 2023-10-09 RX ORDER — LISINOPRIL 5 MG/1
TABLET ORAL
Qty: 90 TABLET | Refills: 1 | Status: SHIPPED | OUTPATIENT
Start: 2023-10-09

## 2023-10-11 ENCOUNTER — OFFICE VISIT (OUTPATIENT)
Dept: FAMILY MEDICINE CLINIC | Facility: CLINIC | Age: 83
End: 2023-10-11
Payer: COMMERCIAL

## 2023-10-11 VITALS
TEMPERATURE: 97.6 F | SYSTOLIC BLOOD PRESSURE: 120 MMHG | DIASTOLIC BLOOD PRESSURE: 62 MMHG | OXYGEN SATURATION: 97 % | HEART RATE: 65 BPM

## 2023-10-11 DIAGNOSIS — I10 PRIMARY HYPERTENSION: Primary | ICD-10-CM

## 2023-10-11 DIAGNOSIS — C61 PROSTATE CANCER (HCC): ICD-10-CM

## 2023-10-11 DIAGNOSIS — E44.0 MODERATE PROTEIN-CALORIE MALNUTRITION (HCC): ICD-10-CM

## 2023-10-11 PROBLEM — Z43.1 PEG (PERCUTANEOUS ENDOSCOPIC GASTROSTOMY) ADJUSTMENT/REPLACEMENT/REMOVAL (HCC): Status: RESOLVED | Noted: 2022-08-01 | Resolved: 2023-10-11

## 2023-10-11 PROCEDURE — 99214 OFFICE O/P EST MOD 30 MIN: CPT | Performed by: FAMILY MEDICINE

## 2023-10-11 NOTE — PATIENT INSTRUCTIONS
Here for recheck and here with family. Discussed urology f-up for hx of prostate cancer and prostate issues. Patient currently on Proscar and cardura. Take BP med as directed for HTN. Monitor pain and rec tylenol prn pain. Notes reviewed  - Heme/onc:        1. Prostate cancer [facility])  25-year-old male presents for follow-up visit for prostate cancer. Patient has increasing PSA. He has been on Lupron. Lupron was canceled most recently secondary to increasing PSA. I reviewed what Lupron is and how it works with patient's daughter. As previously reviewed oral therapy was recommended for his prostate cancer. He still remains unable to walk or move on his own secondary to left-sided weakness from previous CVA. Patient's ECOG status is 4. Patient and patient daughter would like to continue to pursue conservative approach with symptom management. Due to this patient does not need continued follow-up here as he is not pursuing active cancer therapy. He should continue to follow with Dr. Cely Alejo. He could then follow with palliative care if needed or just transition to hospice cares in the future when patient's status declines. Reviewed daughter's of the prostate cancer itself is a slow-moving cancer and unclear if this would be the cause of his death.

## 2023-10-11 NOTE — PROGRESS NOTES
Assessment/Plan:    No problem-specific Assessment & Plan notes found for this encounter. Diagnoses and all orders for this visit:    Primary hypertension    Moderate protein-calorie malnutrition ([address])  Comments:  rec Boost and ensure as tolerated    Prostate cancer [facility])  Comments:  rec urology follow up          Subjective:      Patient ID: Tuan Arce is a 78 y.o. male. Follow-up (F/u . Stopped oncology because psa levels extremely high, to see if pcp would do pain management ). Patient states pain is stable, he uses Tylenol prn. This is rare as well. No more peg tube and swallows food. Uses Boost and Ensure. Medical hx reviewed and notes reviewed from Heme/onc:    HPI:  78-year-old male presents for follow-up for prostate cancer. In 2010 patient was diagnosed with low risk Nilay score 6 prostate cancer in the [facility]. He was given opportunity to be in treatment but due to low risk this was deferred. In February 2020 patient had no evidence of metastatic disease on chest abdomen pelvis imaging. There is thickening of bladder likely due to chronic bladder outlet syndrome due to obstruction from enlarged prostate. There is also a large subcutaneous fluid cystic structure in the mid line lower back measuring 9.2 x 3.8 x 6.6 cm. Bone scan also completed February 2020 and was negative. MRI of the prostate completed in March 2020 showed very low likelihood of cancer present in the prostate. The following portions of the patient's history were reviewed and updated as appropriate: allergies, current medications, past family history, past medical history, past social history, past surgical history, and problem list.    Review of Systems   Constitutional: Negative. HENT: Negative. Eyes: Negative. Respiratory: Negative. Cardiovascular: Negative. Gastrointestinal: Negative. Endocrine: Negative. Genitourinary: Negative.     Musculoskeletal: In litter and stable   Skin: Negative. Allergic/Immunologic: Negative. Neurological: Negative. Hematological: Negative. Psychiatric/Behavioral: Negative. Objective:      /62   Pulse 65   Temp 97.6 °F (36.4 °C) (Temporal)   SpO2 97%          Physical Exam  Constitutional:       Appearance: He is well-developed. HENT:      Head: Normocephalic and atraumatic. Right Ear: External ear normal.      Left Ear: External ear normal.      Nose: Nose normal.      Mouth/Throat:      Mouth: Mucous membranes are moist.   Eyes:      Conjunctiva/sclera: Conjunctivae normal.      Pupils: Pupils are equal, round, and reactive to light. Cardiovascular:      Rate and Rhythm: Normal rate and regular rhythm. Heart sounds: Normal heart sounds. Pulmonary:      Effort: Pulmonary effort is normal.      Breath sounds: Normal breath sounds. Abdominal:      General: Abdomen is flat. Bowel sounds are normal.      Palpations: Abdomen is soft. Musculoskeletal:         General: Normal range of motion. Cervical back: Normal range of motion and neck supple. Skin:     General: Skin is warm and dry. Capillary Refill: Capillary refill takes less than 2 seconds. Neurological:      General: No focal deficit present. Mental Status: He is alert and oriented to person, place, and time. Mental status is at baseline. Deep Tendon Reflexes: Reflexes are normal and symmetric. Psychiatric:         Mood and Affect: Mood normal.         Behavior: Behavior normal.         Thought Content:  Thought content normal.         Judgment: Judgment normal.

## 2024-01-01 NOTE — TELEPHONE ENCOUNTER
Gill Renteria from [facility] called today  PT is asking for a verbal order for patient to have 1 visit over the next two days to re-certify him for PT services  We can contact her at [phone] 
I did make osiel aware 
leroy bauer

## 2024-01-25 ENCOUNTER — OFFICE VISIT (OUTPATIENT)
Dept: UROLOGY | Facility: CLINIC | Age: 84
End: 2024-01-25
Payer: COMMERCIAL

## 2024-01-25 VITALS — SYSTOLIC BLOOD PRESSURE: 122 MMHG | OXYGEN SATURATION: 95 % | HEART RATE: 73 BPM | DIASTOLIC BLOOD PRESSURE: 76 MMHG

## 2024-01-25 DIAGNOSIS — N39.41 URGE INCONTINENCE: Primary | ICD-10-CM

## 2024-01-25 DIAGNOSIS — C61 PROSTATE CANCER (HCC): ICD-10-CM

## 2024-01-25 LAB — POST-VOID RESIDUAL VOLUME, ML POC: 149 ML

## 2024-01-25 PROCEDURE — 51798 US URINE CAPACITY MEASURE: CPT | Performed by: PHYSICIAN ASSISTANT

## 2024-01-25 PROCEDURE — 99204 OFFICE O/P NEW MOD 45 MIN: CPT | Performed by: PHYSICIAN ASSISTANT

## 2024-01-25 NOTE — PROGRESS NOTES
1/25/2024      Chief Complaint   Patient presents with    New Patient Visit    Urinary Retention     Possibly pt daughter thinks he is retaining ever since stroke but he does let her knpw when he has to go         Assessment and Plan    83 y.o. male managed by new patient    1.  Advanced prostate cancer    Nice discussion with Melecio and his daughter she is his primary caregiver.  He unfortunately has advanced prostate cancer he is not interested in additional therapy, nor restaging as it would not change his treatment plan.  They are comfortable with continuing to simply leave things be.  His only complaint today is right leg pain.  This happens a couple times a week.  Although he denies focal bony back pain I suspect there is a possibility he has some radiculopathy from likely bony metastatic cancer.  They understand.  He is only taking small doses of Tylenol and has room to increase that as it does provide him relief.  Fortunately he continues to void on his own volition, he has had no dysuria hematuria retention, bladder scan today is 149 mL last void was a couple hours ago.  Discussed the potential that some day may come where he is unable to void and I would offer him a suprapubic tube.  Today is not the day which he is happy to hear.    Lab Results   Component Value Date    .87 (H) 07/18/2023    .8 (H) 04/27/2023    PSA 41.7 (H) 01/12/2023           History of Present Illness  Melecio Rodriguez is a 83 y.o. male here for evaluation of reestablish care for [place] 6 prostate cancer diagnosed in 2010 reportedly in the [place].  He was on active surveillance but was not really watched.  He was seen by us in February 2020 there was no evidence of metastatic disease on chest abdomen pelvis imaging at that time.  He had a low risk prostate MRI that year as well.  Though he never underwent surgical or radiation therapy he did receive Lupron from 2020 through 2022 at which point he stopped  pursuing treatment.  He had a bad stroke in 2021 he is severely disabled at this time.  He was intermittently followed by hematology oncology he did have a PET scan in 2022 which showed uptake in the prostate compatible with tumor however no metastatic disease.  Of note he was on Lupron at that time and the PSA had risen from 1 to 4.  His PSA is now risen dramatically since being off lupron in the past year and current is 246 and he has had opted to forego further directed cancer treatment per oncology notes from the summer. He is here today due to urinary incontinence and daughter concern he might be in retention. Fortunately his bladder scan is 149ml and he last voided 2-3 hrs ago. He is comfortable has not had any dysuria or hematuria and no days where he was unable to void.      Of note he arrives today on a Rakesh lift and stretcher.  There seems to been some miscommunication with the transportation company with them saying they had to move him off the stretcher and it take it to get another patient. After discussion with their supervisor all in all we are able to examine him and get him back to their van without removing him from the stretcher. He is a rakesh lift due to left hemiparesis from CVA. Daughter was on with insurance company as well and never had transport issues before, first time with this company.      Review of Systems   Constitutional: Negative.    Respiratory: Negative.     Cardiovascular: Negative.    Genitourinary:  Negative for decreased urine volume, difficulty urinating, dysuria, flank pain, frequency, hematuria, scrotal swelling, testicular pain and urgency.   Musculoskeletal:  Positive for gait problem. Negative for arthralgias (right lower leg) and back pain.   Neurological:  Positive for weakness.                Vitals  Vitals:    01/25/24 1505   BP: 122/76   BP Location: Left arm   Patient Position: Sitting   Cuff Size: Standard   Pulse: 73   SpO2: 95%       Physical Exam  Vitals and  nursing note reviewed.   Constitutional:       General: He is not in acute distress.     Appearance: He is well-developed. He is not toxic-appearing or diaphoretic.      Comments: thin frail elderly man on carlos lift and stretcher   HENT:      Head: Normocephalic and atraumatic.      Right Ear: External ear normal.      Left Ear: External ear normal.      Nose: Nose normal.      Mouth/Throat:      Pharynx: Uvula midline.   Eyes:      Conjunctiva/sclera: Conjunctivae normal.      Pupils: Pupils are equal, round, and reactive to light.   Cardiovascular:      Rate and Rhythm: Normal rate and regular rhythm.      Pulses: Normal pulses.      Heart sounds: Normal heart sounds. No murmur heard.  Pulmonary:      Effort: Pulmonary effort is normal.      Breath sounds: Normal breath sounds.   Abdominal:      General: Bowel sounds are normal.      Palpations: Abdomen is soft.   Genitourinary:     Penis: Normal.       Testes: Normal.   Musculoskeletal:         General: No tenderness.      Cervical back: Neck supple.      Comments: no calf swelling tenderness or rash  moves right leg at hip knee and ankle   Skin:     General: Skin is warm and dry.      Findings: No rash.   Neurological:      Mental Status: He is alert and oriented to person, place, and time.      GCS: GCS eye subscore is 4. GCS verbal subscore is 5. GCS motor subscore is 6.      Comments: left upper and lower extremity paresis   Psychiatric:         Behavior: Behavior is cooperative.           Past History  Past Medical History:   Diagnosis Date    Hypertension     PEG (percutaneous endoscopic gastrostomy) adjustment/replacement/removal (HCC) 08/01/2022    Prostate cancer ([facility])     no surgery required     Social History     Socioeconomic History    Marital status: Single     Spouse name: None    Number of children: None    Years of education: None    Highest education level: None   Occupational History    None   Tobacco Use    Smoking status: Never    Smokeless  tobacco: Never   Vaping Use    Vaping status: Never Used   Substance and Sexual Activity    Alcohol use: Never    Drug use: Never    Sexual activity: Not Currently   Other Topics Concern    None   Social History Narrative    Consumes 1 cup of coffee per day     Social Determinants of Health     Financial Resource Strain: Not on file   Food Insecurity: Not on file   Transportation Needs: Not on file   Physical Activity: Not on file   Stress: Not on file   Social Connections: Not on file   Intimate Partner Violence: Not on file   Housing Stability: Not on file     Social History     Tobacco Use   Smoking Status Never   Smokeless Tobacco Never     Family History   Problem Relation Age of Onset    No Known Problems Mother     No Known Problems Father        The following portions of the patient's history were reviewed and updated as appropriate: allergies, current medications, past medical history, past social history, past surgical history and problem list.    Results  Recent Results (from the past 1 hour(s))   POCT Measure PVR    Collection Time: 01/25/24  3:10 PM   Result Value Ref Range    POST-VOID RESIDUAL VOLUME, ML  mL   ]  Lab Results   Component Value Date    .87 (H) 07/18/2023    .8 (H) 04/27/2023    PSA 41.7 (H) 01/12/2023    PSA 17.7 (H) 08/18/2022     Lab Results   Component Value Date    CALCIUM 9.3 07/18/2023    K 4.5 07/18/2023    CO2 28 07/18/2023     (H) 07/18/2023    BUN 36 (H) 07/18/2023    CREATININE 1.15 07/18/2023     Lab Results   Component Value Date    WBC 9.29 07/18/2023    HGB 10.5 (L) 07/18/2023    HCT 33.6 (L) 07/18/2023    MCV 97 07/18/2023     07/18/2023

## 2024-03-14 NOTE — TELEPHONE ENCOUNTER
[id number] - [place], [place] -  levothyroxine 75 MCG Oral Tab    Also has medical questions on lab results / dilation exam 03.12.2024   Spoke with patient's daughter  Reviewed that PSA is now increase to 5 0 from today lab  His father is agreeable to starting [place] or [place]  Please place on Dr Willi Yin schedule tomorrow at 2 PM at [place]  Patient and daughter are aware

## 2024-04-18 DIAGNOSIS — I10 PRIMARY HYPERTENSION: ICD-10-CM

## 2024-04-18 DIAGNOSIS — C61 PROSTATE CANCER (HCC): ICD-10-CM

## 2024-04-18 DIAGNOSIS — R39.9 LOWER URINARY TRACT SYMPTOMS: ICD-10-CM

## 2024-04-19 RX ORDER — FINASTERIDE 5 MG/1
TABLET, FILM COATED ORAL
Qty: 90 TABLET | Refills: 1 | Status: SHIPPED | OUTPATIENT
Start: 2024-04-19

## 2024-04-19 RX ORDER — LISINOPRIL 5 MG/1
TABLET ORAL
Qty: 90 TABLET | Refills: 1 | Status: SHIPPED | OUTPATIENT
Start: 2024-04-19

## 2024-04-19 RX ORDER — DOXAZOSIN 2 MG/1
TABLET ORAL
Qty: 90 TABLET | Refills: 1 | Status: SHIPPED | OUTPATIENT
Start: 2024-04-19

## 2024-04-25 ENCOUNTER — TELEPHONE (OUTPATIENT)
Age: 84
End: 2024-04-25

## 2024-04-25 DIAGNOSIS — I69.391 DYSPHAGIA DUE TO OLD STROKE: ICD-10-CM

## 2024-04-25 DIAGNOSIS — E44.0 MODERATE PROTEIN-CALORIE MALNUTRITION (HCC): ICD-10-CM

## 2024-04-25 DIAGNOSIS — Z86.79 HISTORY OF CEREBRAL HEMORRHAGE: ICD-10-CM

## 2024-04-25 DIAGNOSIS — E85.4 CEREBRAL AMYLOID ANGIOPATHY  (HCC): Primary | ICD-10-CM

## 2024-04-25 DIAGNOSIS — I61.9 CEREBRAL HEMORRHAGE (HCC): Primary | ICD-10-CM

## 2024-04-25 DIAGNOSIS — E85.4 CEREBRAL AMYLOID ANGIOPATHY  (HCC): ICD-10-CM

## 2024-04-25 DIAGNOSIS — Z86.73 HISTORY OF STROKE: ICD-10-CM

## 2024-04-25 DIAGNOSIS — I68.0 CEREBRAL AMYLOID ANGIOPATHY  (HCC): ICD-10-CM

## 2024-04-25 DIAGNOSIS — R62.7 FAILURE TO THRIVE IN ADULT: ICD-10-CM

## 2024-04-25 DIAGNOSIS — I61.9 CEREBRAL HEMORRHAGE (HCC): ICD-10-CM

## 2024-04-25 DIAGNOSIS — L89.130: ICD-10-CM

## 2024-04-25 DIAGNOSIS — E44.0 MODERATE PROTEIN-CALORIE MALNUTRITION (HCC): Primary | ICD-10-CM

## 2024-04-25 DIAGNOSIS — L89.149 PRESSURE INJURY OF SKIN OF LEFT LOWER BACK, UNSPECIFIED INJURY STAGE: ICD-10-CM

## 2024-04-25 DIAGNOSIS — G92.8 TOXIC METABOLIC ENCEPHALOPATHY: ICD-10-CM

## 2024-04-25 DIAGNOSIS — C61 PROSTATE CANCER (HCC): ICD-10-CM

## 2024-04-25 DIAGNOSIS — I68.0 CEREBRAL AMYLOID ANGIOPATHY  (HCC): Primary | ICD-10-CM

## 2024-04-25 DIAGNOSIS — L89.890 PRESSURE INJURY OF FOOT, UNSTAGEABLE, UNSPECIFIED LATERALITY (HCC): ICD-10-CM

## 2024-04-25 NOTE — TELEPHONE ENCOUNTER
Nora from [facility] , called stated they were out to see Melecio and recommend patient be set up for Palliative Care and Home Health Evaluation. Patient requires gurney transportation.    Patient has a pressure sore low-mid back line. Daughter has been applying saline and a bandage.    Please review and advice

## 2024-06-07 ENCOUNTER — TELEPHONE (OUTPATIENT)
Age: 84
End: 2024-06-07

## 2024-06-07 NOTE — TELEPHONE ENCOUNTER
Calling asking to speak with physician about obtaining a CTI (certification of terminal illness) patient is stated to be passing, this morning he aspirated and was put on oxygen. Kelsey aware that Dr. Del Rio is not in an DrMilly Mark is covered. Warm transferred to the office to patch through to the doctor. Pt cannot transition from palliative care to hospice without CTI. It is a medicare requirement.

## 2024-06-07 NOTE — TELEPHONE ENCOUNTER
Spoke with  and he did call and give hospice the okay for the CTI. If they need anything else to please give office a call and follow up with Dr. Del Rio.

## 2024-06-10 ENCOUNTER — TELEPHONE (OUTPATIENT)
Age: 84
End: 2024-06-10

## 2024-06-10 NOTE — TELEPHONE ENCOUNTER
[facility] called in regards to faxing over some medical worksheets for the patient as he has passed away . Do not see anything in chart .Provided direct fax number [phone] . Please keep a watch for paperwork and return asap

## 2024-06-10 NOTE — TELEPHONE ENCOUNTER
Home called in regard to the form that was sent in.  They do need that back as soon as possible to submit it to the state.

## 2024-08-08 ENCOUNTER — TELEPHONE (OUTPATIENT)
Age: 84
End: 2024-08-08

## 2024-08-08 NOTE — TELEPHONE ENCOUNTER
Khadra gonzalez/[facility] called she has been requesting signed orders for pt since June.    Khadra is requesting the following orders:  Order# [id number]  Order# [id number]    Both signed orders are scanned into pt's chart.    Please fax orders:  attn: Khadra  Fax [phone]

## (undated) DEVICE — CHLORAPREP HI-LITE 26ML ORANGE

## (undated) DEVICE — TUBING SUCTION 5MM X 12 FT

## (undated) DEVICE — BINDER ABDOMINAL 30-45 IN

## (undated) DEVICE — GLOVE SRG BIOGEL 7

## (undated) DEVICE — AIR AND WATER TUBING/CAP SET FOR OLYMPUS® SCOPES: Brand: ERBE

## (undated) DEVICE — Device: Brand: DEFENDO AIR/WATER/SUCTION AND BIOPSY VALVE

## (undated) DEVICE — TRAVELKIT CONTAINS FIRST STEP KIT (200ML EP-4 KIT) AND SOILED SCOPE BAG - 1 KIT: Brand: TRAVELKIT CONTAINS FIRST STEP KIT AND SOILED SCOPE BAG

## (undated) DEVICE — PERCUTANEOUS ENDOSCOPIC GASTROSTOMY KIT: Brand: ENDOVIVE SAFETY PEG KIT

## (undated) DEVICE — BAG URINE DRAINAGE 2000ML ANTI RFLX LF

## (undated) DEVICE — GLOVE INDICATOR PI UNDERGLOVE SZ 7.5 BLUE